# Patient Record
Sex: MALE | Race: WHITE | Employment: FULL TIME | URBAN - METROPOLITAN AREA
[De-identification: names, ages, dates, MRNs, and addresses within clinical notes are randomized per-mention and may not be internally consistent; named-entity substitution may affect disease eponyms.]

---
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
## 2021-03-23 PROBLEM — M51.26 HNP (HERNIATED NUCLEUS PULPOSUS), LUMBAR: Status: ACTIVE | Noted: 2021-03-23

## 2021-03-23 PROBLEM — M43.17 SPONDYLOLISTHESIS AT L5-S1 LEVEL: Status: ACTIVE | Noted: 2021-03-23

## 2021-03-23 PROBLEM — E66.01 MORBID OBESITY (HCC): Status: ACTIVE | Noted: 2021-03-23

## 2021-05-05 ENCOUNTER — HOSPITAL ENCOUNTER (OUTPATIENT)
Dept: SURGERY | Age: 56
Discharge: HOME | End: 2021-05-05
Payer: COMMERCIAL

## 2021-05-05 VITALS
BODY MASS INDEX: 42.66 KG/M2 | HEART RATE: 79 BPM | DIASTOLIC BLOOD PRESSURE: 88 MMHG | WEIGHT: 315 LBS | HEIGHT: 72 IN | OXYGEN SATURATION: 97 % | RESPIRATION RATE: 18 BRPM | SYSTOLIC BLOOD PRESSURE: 162 MMHG | TEMPERATURE: 98.4 F

## 2021-05-05 LAB
ANION GAP SERPL CALC-SCNC: 7 MMOL/L (ref 7–16)
APPEARANCE UR: ABNORMAL
BACTERIA SPEC CULT: ABNORMAL
BACTERIA URNS QL MICRO: 0 /HPF
BASOPHILS # BLD: 0.1 K/UL (ref 0–0.2)
BASOPHILS NFR BLD: 1 % (ref 0–2)
BILIRUB UR QL: NEGATIVE
BUN SERPL-MCNC: 16 MG/DL (ref 6–23)
CALCIUM SERPL-MCNC: 9 MG/DL (ref 8.3–10.4)
CASTS URNS QL MICRO: 0 /LPF
CHLORIDE SERPL-SCNC: 107 MMOL/L (ref 98–107)
CO2 SERPL-SCNC: 25 MMOL/L (ref 21–32)
COLOR UR: YELLOW
CREAT SERPL-MCNC: 0.97 MG/DL (ref 0.8–1.5)
DIFFERENTIAL METHOD BLD: ABNORMAL
EOSINOPHIL # BLD: 0.2 K/UL (ref 0–0.8)
EOSINOPHIL NFR BLD: 3 % (ref 0.5–7.8)
EPI CELLS #/AREA URNS HPF: 0 /HPF
ERYTHROCYTE [DISTWIDTH] IN BLOOD BY AUTOMATED COUNT: 13.2 % (ref 11.9–14.6)
EST. AVERAGE GLUCOSE BLD GHB EST-MCNC: 214 MG/DL
GLUCOSE BLD STRIP.AUTO-MCNC: 136 MG/DL (ref 65–100)
GLUCOSE SERPL-MCNC: 143 MG/DL (ref 65–100)
GLUCOSE UR STRIP.AUTO-MCNC: >1000 MG/DL
HBA1C MFR BLD: 9.1 % (ref 4.2–6.3)
HCT VFR BLD AUTO: 51.3 % (ref 41.1–50.3)
HGB BLD-MCNC: 16.8 G/DL (ref 13.6–17.2)
HGB UR QL STRIP: NEGATIVE
IMM GRANULOCYTES # BLD AUTO: 0 K/UL (ref 0–0.5)
IMM GRANULOCYTES NFR BLD AUTO: 1 % (ref 0–5)
KETONES UR QL STRIP.AUTO: NEGATIVE MG/DL
LEUKOCYTE ESTERASE UR QL STRIP.AUTO: NEGATIVE
LYMPHOCYTES # BLD: 2 K/UL (ref 0.5–4.6)
LYMPHOCYTES NFR BLD: 27 % (ref 13–44)
MCH RBC QN AUTO: 27.8 PG (ref 26.1–32.9)
MCHC RBC AUTO-ENTMCNC: 32.7 G/DL (ref 31.4–35)
MCV RBC AUTO: 84.8 FL (ref 79.6–97.8)
MONOCYTES # BLD: 0.6 K/UL (ref 0.1–1.3)
MONOCYTES NFR BLD: 8 % (ref 4–12)
NEUTS SEG # BLD: 4.6 K/UL (ref 1.7–8.2)
NEUTS SEG NFR BLD: 61 % (ref 43–78)
NITRITE UR QL STRIP.AUTO: NEGATIVE
NRBC # BLD: 0 K/UL (ref 0–0.2)
PH UR STRIP: 6 [PH] (ref 5–9)
PLATELET # BLD AUTO: 274 K/UL (ref 150–450)
PMV BLD AUTO: 10.8 FL (ref 9.4–12.3)
POTASSIUM SERPL-SCNC: 3.8 MMOL/L (ref 3.5–5.1)
PROT UR STRIP-MCNC: NEGATIVE MG/DL
RBC # BLD AUTO: 6.05 M/UL (ref 4.23–5.6)
RBC #/AREA URNS HPF: ABNORMAL /HPF
SERVICE CMNT-IMP: ABNORMAL
SERVICE CMNT-IMP: ABNORMAL
SODIUM SERPL-SCNC: 139 MMOL/L (ref 138–145)
SP GR UR REFRACTOMETRY: 1.05 (ref 1–1.02)
UROBILINOGEN UR QL STRIP.AUTO: 1 EU/DL (ref 0.2–1)
WBC # BLD AUTO: 7.6 K/UL (ref 4.3–11.1)
WBC URNS QL MICRO: ABNORMAL /HPF

## 2021-05-05 PROCEDURE — 80048 BASIC METABOLIC PNL TOTAL CA: CPT

## 2021-05-05 PROCEDURE — 81003 URINALYSIS AUTO W/O SCOPE: CPT

## 2021-05-05 PROCEDURE — 82962 GLUCOSE BLOOD TEST: CPT

## 2021-05-05 PROCEDURE — 85025 COMPLETE CBC W/AUTO DIFF WBC: CPT

## 2021-05-05 PROCEDURE — 77030027138 HC INCENT SPIROMETER -A

## 2021-05-05 PROCEDURE — 83036 HEMOGLOBIN GLYCOSYLATED A1C: CPT

## 2021-05-05 PROCEDURE — 87641 MR-STAPH DNA AMP PROBE: CPT

## 2021-05-05 RX ORDER — IBUPROFEN 200 MG
400 TABLET ORAL AS NEEDED
Status: ON HOLD | COMMUNITY
End: 2021-11-16

## 2021-05-05 RX ORDER — ACETAMINOPHEN 500 MG
500 TABLET ORAL AS NEEDED
COMMUNITY

## 2021-05-05 RX ORDER — CETIRIZINE HCL 10 MG
10 TABLET ORAL AS NEEDED
COMMUNITY

## 2021-05-05 NOTE — PERIOP NOTES
PLEASE CONTINUE TAKING ALL PRESCRIPTION MEDICATIONS UP TO THE DAY OF SURGERY UNLESS OTHERWISE DIRECTED BELOW. DISCONTINUE all vitamins and supplements 7 days prior to surgery. DISCONTINUE Non-Steriodal Anti-Inflammatory (NSAIDS) such as Advil and Aleve 5 days prior to surgery. Home Medications to take  the day of surgery    metoprolol   tramadol if needed   Aspirin 81 mg     Esomeprazole-nexium    Levothyroxine    Novolog 70/30: If the morning blood glucose is greater than 120 --take 1/2 of the morning dose, take 15 units. If morning blood glucose is less than 120, hold the insulin     Home Medications   to Hold   Vitamins, Supplements, and Herbals. Non-Steriodal Anti-Inflammatory (NSAIDS) such as Advil-ibuprofen and Aleve. Hold farxiga, glipizide, fenofibrate the morning of surgery        Comments    Covid test 2:45 @ [address] and CPAP   Per anesthesia protocol: If you feel symptoms of low blood sugar while fasting, check level. If low, drink only 4 ounces of a clear, sugary liquid and call pre-op at [phone]. Please do not bring home medications with you on the day of surgery unless otherwise directed by your nurse. If you are instructed to bring home medications, please give them to your nurse as they will be administered by the nursing staff. If you have any questions, please call [place] [phone] or [facility] [phone]. A copy of this note was provided to the patient for reference.

## 2021-05-05 NOTE — PERIOP NOTES
Recent Results (from the past 12 hour(s))   GLUCOSE, POC    Collection Time: 05/05/21  1:24 PM   Result Value Ref Range    Glucose (POC) 136 (H) 65 - 100 mg/dL    Performed by Aaron    CBC WITH AUTOMATED DIFF    Collection Time: 05/05/21  1:26 PM   Result Value Ref Range    WBC 7.6 4.3 - 11.1 K/uL    RBC 6.05 (H) 4.23 - 5.6 M/uL    HGB 16.8 13.6 - 17.2 g/dL    HCT 51.3 (H) 41.1 - 50.3 %    MCV 84.8 79.6 - 97.8 FL    MCH 27.8 26.1 - 32.9 PG    MCHC 32.7 31.4 - 35.0 g/dL    RDW 13.2 11.9 - 14.6 %    PLATELET 576 903 - 082 K/uL    MPV 10.8 9.4 - 12.3 FL    ABSOLUTE NRBC 0.00 0.0 - 0.2 K/uL    DF AUTOMATED      NEUTROPHILS 61 43 - 78 %    LYMPHOCYTES 27 13 - 44 %    MONOCYTES 8 4.0 - 12.0 %    EOSINOPHILS 3 0.5 - 7.8 %    BASOPHILS 1 0.0 - 2.0 %    IMMATURE GRANULOCYTES 1 0.0 - 5.0 %    ABS. NEUTROPHILS 4.6 1.7 - 8.2 K/UL    ABS. LYMPHOCYTES 2.0 0.5 - 4.6 K/UL    ABS. MONOCYTES 0.6 0.1 - 1.3 K/UL    ABS. EOSINOPHILS 0.2 0.0 - 0.8 K/UL    ABS. BASOPHILS 0.1 0.0 - 0.2 K/UL    ABS. IMM.  GRANS. 0.0 0.0 - 0.5 K/UL   METABOLIC PANEL, BASIC    Collection Time: 05/05/21  1:26 PM   Result Value Ref Range    Sodium 139 138 - 145 mmol/L    Potassium 3.8 3.5 - 5.1 mmol/L    Chloride 107 98 - 107 mmol/L    CO2 25 21 - 32 mmol/L    Anion gap 7 7 - 16 mmol/L    Glucose 143 (H) 65 - 100 mg/dL    BUN 16 6 - 23 MG/DL    Creatinine 0.97 0.8 - 1.5 MG/DL    GFR est AA >60 >60 ml/min/1.73m2    GFR est non-AA >60 >60 ml/min/1.73m2    Calcium 9.0 8.3 - 10.4 MG/DL   URINALYSIS W/ RFLX MICROSCOPIC    Collection Time: 05/05/21  1:26 PM   Result Value Ref Range    Color YELLOW      Appearance TURBID      Specific gravity 1.052 (H) 1.001 - 1.023      pH (UA) 6.0 5.0 - 9.0      Protein Negative NEG mg/dL    Glucose >1,000 mg/dL    Ketone Negative NEG mg/dL    Bilirubin Negative NEG      Blood Negative NEG      Urobilinogen 1.0 0.2 - 1.0 EU/dL    Nitrites Negative NEG      Leukocyte Esterase Negative NEG      WBC 0-3 0 /hpf    RBC 0-3 0 /hpf    Epithelial cells 0 0 /hpf    Bacteria 0 0 /hpf    Casts 0 0 /lpf   MSSA/MRSA SC BY PCR, NASAL SWAB    Collection Time: 05/05/21  1:26 PM    Specimen: Nasal swab   Result Value Ref Range    Special Requests: NO SPECIAL REQUESTS      Culture result: (A)       MRSA target DNA not detected, SA target DNA detected. A MRSA negative, SA positive test result does not preclude MRSA nasal colonization. HEMOGLOBIN A1C WITH EAG    Collection Time: 05/05/21  1:26 PM   Result Value Ref Range    Hemoglobin A1c 9.1 (H) 4.20 - 6.30 %    Est. average glucose 214 mg/dL     Reviewed, within defined limits of anesthesia protocol. Germaine at Dr. Cruz Muller office notified of UA- specific gravity 1.052. Sarmad Pearson at Dr. Cruz Muller office notified of A1c 9.1 and nasal swab SA positive.   Routed to surgeon

## 2021-05-05 NOTE — PERIOP NOTES
Patient verified name & . Order to obtain consent  found in EHR  and matches case posting. TYPE  CASE: 3              Orders:  received  Labs per Spine protocol:  CBC with Diff, BMP, UA, MRSA/MSSA   Labs per anesthesia protocol: CBC, BMP included. T&S, SQBS s/h for DOS  EKG/cardiac records  :  3/19/21 copy of EKG from [facility] sent to pre-op area. 3/19/21 cardiac clearance, Dr. Kanika Spence CE  Echo 20:  EF 50-54%  Glucose: 136    Patient was found to have a recent positive Covid-19 history, the below timetable is used for the earliest OR date for their elective procedure. Patient states had no respiratory symptoms including cough and/or dyspnea, was not hospitalized and not in the ICU. The date of the test was 3/25/21. The earliest OR date is 21. Medication bottles visualized today. Medication instructions provided per printout of PTA med instruction note in EHR (copy provided to patient). Patient verbalizes understanding of instructions. Covid test 21 at 2:45, 2 [employer]. Patient confirmed appointment. Pt viewed Spine Pre-hab video. All further questions were addressed. Pt was provided with antibacterial soap, Hibiclens, Spine Recovery booklet and incentive spirometer. Handouts and all Surgery instructions provided to pt and pt verbalizes understanding. Patient Guide to Surgery Packet provided to patient. Packet includes Patient Guide to surgery handout, Facts about Pain Management handout, Hand Hygiene handout, Patient Education and Teaching Sheet -Transfusion of Blood and Blood Products handout, and  [facility] frequent question and answer sheet. Guide reviewed with the patient and all questions answered to the satisfaction of the patient. Patient instructed on the following and verbalized understanding:  Arrive at Main entrance, time of arrival to be called the day before by 1700.   Responsible adult must drive patient to and from hospital and stay with them 24 hours postoperatively. Npo after midnight including gum, mints and ice chips. Shower using hibiclens the night before and the morning of surgery. Hibiclens provided to the patient with handout and verbal instructions for use. Leave all valuables at home. Instructed on no jewelry or body piercings on the dos. Bring insurance card and ID. No perfumes, oil, powder, colognes, makeup or  lotions on the skin. Visiting hours: 6:30 am- 9 pm for adult visitors. Patient verbalized understanding of all instructions and provided all medical/health information to the best of their ability.

## 2021-05-11 ENCOUNTER — ANESTHESIA EVENT (OUTPATIENT)
Dept: SURGERY | Age: 56
DRG: 460 | End: 2021-05-11
Payer: COMMERCIAL

## 2021-05-12 ENCOUNTER — APPOINTMENT (OUTPATIENT)
Dept: GENERAL RADIOLOGY | Age: 56
DRG: 460 | End: 2021-05-12
Attending: NEUROLOGICAL SURGERY
Payer: COMMERCIAL

## 2021-05-12 ENCOUNTER — HOSPITAL ENCOUNTER (INPATIENT)
Age: 56
LOS: 1 days | Discharge: HOME | DRG: 460 | End: 2021-05-13
Attending: NEUROLOGICAL SURGERY | Admitting: NEUROLOGICAL SURGERY
Payer: COMMERCIAL

## 2021-05-12 ENCOUNTER — ANESTHESIA (OUTPATIENT)
Dept: SURGERY | Age: 56
DRG: 460 | End: 2021-05-12
Payer: COMMERCIAL

## 2021-05-12 DIAGNOSIS — M43.17 SPONDYLOLISTHESIS AT L5-S1 LEVEL: Primary | ICD-10-CM

## 2021-05-12 LAB
ABO + RH BLD: NORMAL
BLOOD GROUP ANTIBODIES SERPL: NORMAL
GLUCOSE BLD STRIP.AUTO-MCNC: 161 MG/DL (ref 65–100)
GLUCOSE BLD STRIP.AUTO-MCNC: 239 MG/DL (ref 65–100)
GLUCOSE BLD STRIP.AUTO-MCNC: 240 MG/DL (ref 65–100)
GLUCOSE BLD STRIP.AUTO-MCNC: 252 MG/DL (ref 65–100)
GLUCOSE BLD STRIP.AUTO-MCNC: 253 MG/DL (ref 65–100)
GLUCOSE BLD STRIP.AUTO-MCNC: 262 MG/DL (ref 65–100)
SERVICE CMNT-IMP: ABNORMAL
SPECIMEN EXP DATE BLD: NORMAL

## 2021-05-12 PROCEDURE — 22630 ARTHRD PST TQ 1NTRSPC LUM: CPT | Performed by: NEUROLOGICAL SURGERY

## 2021-05-12 PROCEDURE — 74011250637 HC RX REV CODE- 250/637: Performed by: NEUROLOGICAL SURGERY

## 2021-05-12 PROCEDURE — 74011250636 HC RX REV CODE- 250/636: Performed by: ANESTHESIOLOGY

## 2021-05-12 PROCEDURE — 77030040830 HC CATH URETH FOL MDII -A: Performed by: NEUROLOGICAL SURGERY

## 2021-05-12 PROCEDURE — C1713 ANCHOR/SCREW BN/BN,TIS/BN: HCPCS | Performed by: NEUROLOGICAL SURGERY

## 2021-05-12 PROCEDURE — 77030040106 HC FCPS BIPOLAR DISP INLC -D: Performed by: NEUROLOGICAL SURGERY

## 2021-05-12 PROCEDURE — 65270000029 HC RM PRIVATE

## 2021-05-12 PROCEDURE — 74011000250 HC RX REV CODE- 250: Performed by: NEUROLOGICAL SURGERY

## 2021-05-12 PROCEDURE — 76060000039 HC ANESTHESIA 4 TO 4.5 HR: Performed by: NEUROLOGICAL SURGERY

## 2021-05-12 PROCEDURE — 77030018390 HC SPNG HEMSTAT2 J&J -B: Performed by: NEUROLOGICAL SURGERY

## 2021-05-12 PROCEDURE — 74011000250 HC RX REV CODE- 250: Performed by: NURSE ANESTHETIST, CERTIFIED REGISTERED

## 2021-05-12 PROCEDURE — 74011250637 HC RX REV CODE- 250/637: Performed by: ANESTHESIOLOGY

## 2021-05-12 PROCEDURE — 72100 X-RAY EXAM L-S SPINE 2/3 VWS: CPT

## 2021-05-12 PROCEDURE — 74011636637 HC RX REV CODE- 636/637: Performed by: ANESTHESIOLOGY

## 2021-05-12 PROCEDURE — 20939 BONE MARROW ASPIR BONE GRFG: CPT | Performed by: NEUROLOGICAL SURGERY

## 2021-05-12 PROCEDURE — 4A11X4G MONITORING OF PERIPHERAL NERVOUS ELECTRICAL ACTIVITY, INTRAOPERATIVE, EXTERNAL APPROACH: ICD-10-PCS | Performed by: NEUROLOGICAL SURGERY

## 2021-05-12 PROCEDURE — 2709999900 HC NON-CHARGEABLE SUPPLY: Performed by: NEUROLOGICAL SURGERY

## 2021-05-12 PROCEDURE — 82962 GLUCOSE BLOOD TEST: CPT

## 2021-05-12 PROCEDURE — 77030013951 HC SEAL TISS ADH DURASL KT INLC -G1: Performed by: NEUROLOGICAL SURGERY

## 2021-05-12 PROCEDURE — 77030019908 HC STETH ESOPH SIMS -A: Performed by: ANESTHESIOLOGY

## 2021-05-12 PROCEDURE — 77030019557 HC ELECTRD VES SEAL MEDT -F: Performed by: NEUROLOGICAL SURGERY

## 2021-05-12 PROCEDURE — 77030037804 HC GRFT BNE GRAN OSTEOAMP BTUS -F: Performed by: NEUROLOGICAL SURGERY

## 2021-05-12 PROCEDURE — 2709999900 HC NON-CHARGEABLE SUPPLY

## 2021-05-12 PROCEDURE — 74011250636 HC RX REV CODE- 250/636: Performed by: NEUROLOGICAL SURGERY

## 2021-05-12 PROCEDURE — 77030003029 HC SUT VCRL J&J -B: Performed by: NEUROLOGICAL SURGERY

## 2021-05-12 PROCEDURE — 77030014007 HC SPNG HEMSTAT J&J -B: Performed by: NEUROLOGICAL SURGERY

## 2021-05-12 PROCEDURE — 77030021678 HC GLIDESCP STAT DISP VERT -B: Performed by: ANESTHESIOLOGY

## 2021-05-12 PROCEDURE — 77030013567 HC DRN WND RESERV BARD -A: Performed by: NEUROLOGICAL SURGERY

## 2021-05-12 PROCEDURE — 0ST40ZZ RESECTION OF LUMBOSACRAL DISC, OPEN APPROACH: ICD-10-PCS | Performed by: NEUROLOGICAL SURGERY

## 2021-05-12 PROCEDURE — 76010000175 HC OR TIME 4 TO 4.5 HR INTENSV-TIER 1: Performed by: NEUROLOGICAL SURGERY

## 2021-05-12 PROCEDURE — 77030013794 HC KT TRNSDUC BLD EDWD -B: Performed by: ANESTHESIOLOGY

## 2021-05-12 PROCEDURE — C1889 IMPLANT/INSERT DEVICE, NOC: HCPCS | Performed by: NEUROLOGICAL SURGERY

## 2021-05-12 PROCEDURE — 77030034475 HC MISC IMPL SPN: Performed by: NEUROLOGICAL SURGERY

## 2021-05-12 PROCEDURE — 77030028270 HC SRGFL HEMSTAT MTRX J&J -C: Performed by: NEUROLOGICAL SURGERY

## 2021-05-12 PROCEDURE — 0SG30AJ FUSION OF LUMBOSACRAL JOINT WITH INTERBODY FUSION DEVICE, POSTERIOR APPROACH, ANTERIOR COLUMN, OPEN APPROACH: ICD-10-PCS | Performed by: NEUROLOGICAL SURGERY

## 2021-05-12 PROCEDURE — 00NY0ZZ RELEASE LUMBAR SPINAL CORD, OPEN APPROACH: ICD-10-PCS | Performed by: NEUROLOGICAL SURGERY

## 2021-05-12 PROCEDURE — 77030012935 HC DRSG AQUACEL BMS -B: Performed by: NEUROLOGICAL SURGERY

## 2021-05-12 PROCEDURE — 74011636637 HC RX REV CODE- 636/637: Performed by: NEUROLOGICAL SURGERY

## 2021-05-12 PROCEDURE — 77030037088 HC TUBE ENDOTRACH ORAL NSL COVD-A: Performed by: ANESTHESIOLOGY

## 2021-05-12 PROCEDURE — 77030040361 HC SLV COMPR DVT MDII -B: Performed by: NEUROLOGICAL SURGERY

## 2021-05-12 PROCEDURE — 86901 BLOOD TYPING SEROLOGIC RH(D): CPT

## 2021-05-12 PROCEDURE — 77030013292 HC BOWL MX PRSM J&J -A: Performed by: ANESTHESIOLOGY

## 2021-05-12 PROCEDURE — 77030040922 HC BLNKT HYPOTHRM STRY -A: Performed by: ANESTHESIOLOGY

## 2021-05-12 PROCEDURE — 77030012406 HC DRN WND PENRS BARD -A: Performed by: NEUROLOGICAL SURGERY

## 2021-05-12 PROCEDURE — 22853 INSJ BIOMECHANICAL DEVICE: CPT | Performed by: NEUROLOGICAL SURGERY

## 2021-05-12 PROCEDURE — 77030012894: Performed by: NEUROLOGICAL SURGERY

## 2021-05-12 PROCEDURE — 88304 TISSUE EXAM BY PATHOLOGIST: CPT

## 2021-05-12 PROCEDURE — 01NB0ZZ RELEASE LUMBAR NERVE, OPEN APPROACH: ICD-10-PCS | Performed by: NEUROLOGICAL SURGERY

## 2021-05-12 PROCEDURE — 77030005401 HC CATH RAD ARRO -A: Performed by: ANESTHESIOLOGY

## 2021-05-12 PROCEDURE — 74011000272 HC RX REV CODE- 272: Performed by: NEUROLOGICAL SURGERY

## 2021-05-12 PROCEDURE — 74011250636 HC RX REV CODE- 250/636: Performed by: NURSE ANESTHETIST, CERTIFIED REGISTERED

## 2021-05-12 PROCEDURE — 20936 SP BONE AGRFT LOCAL ADD-ON: CPT | Performed by: NEUROLOGICAL SURGERY

## 2021-05-12 PROCEDURE — 22840 INSERT SPINE FIXATION DEVICE: CPT | Performed by: NEUROLOGICAL SURGERY

## 2021-05-12 PROCEDURE — 76210000006 HC OR PH I REC 0.5 TO 1 HR: Performed by: NEUROLOGICAL SURGERY

## 2021-05-12 DEVICE — TOP LOADING BODY
Type: IMPLANTABLE DEVICE | Site: SPINE LUMBAR | Status: FUNCTIONAL
Brand: FIREBIRD NXG

## 2021-05-12 DEVICE — GRAFT BNE SUB 25CC 2MM GRAN ALLOGENIC MORPHOGENETIC PROT W: Type: IMPLANTABLE DEVICE | Site: SPINE LUMBAR | Status: FUNCTIONAL

## 2021-05-12 DEVICE — SET SCREW
Type: IMPLANTABLE DEVICE | Site: SPINE LUMBAR | Status: FUNCTIONAL
Brand: FIREBIRD NXG

## 2021-05-12 DEVICE — 6.5MM X 10MM WIDE X 24MM NON-LORDOTIC  ASSEMBLY
Type: IMPLANTABLE DEVICE | Site: SPINE LUMBAR | Status: FUNCTIONAL
Brand: FORZA XP

## 2021-05-12 DEVICE — 5.5MM X 40MM CORTICAL BONE SCREW
Type: IMPLANTABLE DEVICE | Site: SPINE LUMBAR | Status: FUNCTIONAL
Brand: JANUS

## 2021-05-12 DEVICE — 35MM ROD, PRE-LORDOSED
Type: IMPLANTABLE DEVICE | Site: SPINE LUMBAR | Status: FUNCTIONAL
Brand: FIREBIRD

## 2021-05-12 DEVICE — 5.5MM X 50MM CORTICAL BONE SCREW
Type: IMPLANTABLE DEVICE | Site: SPINE LUMBAR | Status: FUNCTIONAL
Brand: JANUS

## 2021-05-12 RX ORDER — OXYCODONE HYDROCHLORIDE 5 MG/1
5 TABLET ORAL
Status: COMPLETED | OUTPATIENT
Start: 2021-05-12 | End: 2021-05-12

## 2021-05-12 RX ORDER — OXYCODONE AND ACETAMINOPHEN 5; 325 MG/1; MG/1
1 TABLET ORAL AS NEEDED
Status: DISCONTINUED | OUTPATIENT
Start: 2021-05-12 | End: 2021-05-12 | Stop reason: HOSPADM

## 2021-05-12 RX ORDER — SODIUM CHLORIDE 0.9 % (FLUSH) 0.9 %
5-40 SYRINGE (ML) INJECTION EVERY 8 HOURS
Status: DISCONTINUED | OUTPATIENT
Start: 2021-05-12 | End: 2021-05-13

## 2021-05-12 RX ORDER — NEOSTIGMINE METHYLSULFATE 1 MG/ML
INJECTION, SOLUTION INTRAVENOUS AS NEEDED
Status: DISCONTINUED | OUTPATIENT
Start: 2021-05-12 | End: 2021-05-12 | Stop reason: HOSPADM

## 2021-05-12 RX ORDER — ASPIRIN 81 MG/1
81 TABLET ORAL DAILY
Status: DISCONTINUED | OUTPATIENT
Start: 2021-05-13 | End: 2021-05-13 | Stop reason: HOSPADM

## 2021-05-12 RX ORDER — VANCOMYCIN HYDROCHLORIDE 1 G/20ML
INJECTION, POWDER, LYOPHILIZED, FOR SOLUTION INTRAVENOUS AS NEEDED
Status: DISCONTINUED | OUTPATIENT
Start: 2021-05-12 | End: 2021-05-12 | Stop reason: HOSPADM

## 2021-05-12 RX ORDER — FAMOTIDINE 20 MG/1
20 TABLET, FILM COATED ORAL ONCE
Status: COMPLETED | OUTPATIENT
Start: 2021-05-12 | End: 2021-05-12

## 2021-05-12 RX ORDER — ZOLPIDEM TARTRATE 5 MG/1
5 TABLET ORAL
Status: DISCONTINUED | OUTPATIENT
Start: 2021-05-12 | End: 2021-05-13 | Stop reason: HOSPADM

## 2021-05-12 RX ORDER — THROMBIN, TOPICAL (BOVINE) 20000 UNIT
KIT TOPICAL AS NEEDED
Status: DISCONTINUED | OUTPATIENT
Start: 2021-05-12 | End: 2021-05-12 | Stop reason: HOSPADM

## 2021-05-12 RX ORDER — INSULIN GLARGINE 100 [IU]/ML
40 INJECTION, SOLUTION SUBCUTANEOUS
Status: DISCONTINUED | OUTPATIENT
Start: 2021-05-12 | End: 2021-05-13 | Stop reason: HOSPADM

## 2021-05-12 RX ORDER — SODIUM CHLORIDE 0.9 % (FLUSH) 0.9 %
5-40 SYRINGE (ML) INJECTION EVERY 8 HOURS
Status: DISCONTINUED | OUTPATIENT
Start: 2021-05-12 | End: 2021-05-12 | Stop reason: HOSPADM

## 2021-05-12 RX ORDER — FENOFIBRATE 160 MG/1
160 TABLET ORAL DAILY
Status: DISCONTINUED | OUTPATIENT
Start: 2021-05-13 | End: 2021-05-13 | Stop reason: HOSPADM

## 2021-05-12 RX ORDER — PROPOFOL 10 MG/ML
INJECTION, EMULSION INTRAVENOUS AS NEEDED
Status: DISCONTINUED | OUTPATIENT
Start: 2021-05-12 | End: 2021-05-12 | Stop reason: HOSPADM

## 2021-05-12 RX ORDER — OXYCODONE AND ACETAMINOPHEN 10; 325 MG/1; MG/1
1 TABLET ORAL
Status: DISCONTINUED | OUTPATIENT
Start: 2021-05-12 | End: 2021-05-13 | Stop reason: HOSPADM

## 2021-05-12 RX ORDER — SODIUM CHLORIDE 0.9 % (FLUSH) 0.9 %
5-40 SYRINGE (ML) INJECTION AS NEEDED
Status: DISCONTINUED | OUTPATIENT
Start: 2021-05-12 | End: 2021-05-13 | Stop reason: HOSPADM

## 2021-05-12 RX ORDER — SODIUM CHLORIDE, SODIUM LACTATE, POTASSIUM CHLORIDE, CALCIUM CHLORIDE 600; 310; 30; 20 MG/100ML; MG/100ML; MG/100ML; MG/100ML
75 INJECTION, SOLUTION INTRAVENOUS CONTINUOUS
Status: DISPENSED | OUTPATIENT
Start: 2021-05-12 | End: 2021-05-13

## 2021-05-12 RX ORDER — KETAMINE HYDROCHLORIDE 50 MG/ML
INJECTION, SOLUTION INTRAMUSCULAR; INTRAVENOUS AS NEEDED
Status: DISCONTINUED | OUTPATIENT
Start: 2021-05-12 | End: 2021-05-12 | Stop reason: HOSPADM

## 2021-05-12 RX ORDER — SODIUM CHLORIDE 0.9 % (FLUSH) 0.9 %
5-40 SYRINGE (ML) INJECTION AS NEEDED
Status: DISCONTINUED | OUTPATIENT
Start: 2021-05-12 | End: 2021-05-13

## 2021-05-12 RX ORDER — MIDAZOLAM HYDROCHLORIDE 1 MG/ML
2 INJECTION, SOLUTION INTRAMUSCULAR; INTRAVENOUS ONCE
Status: ACTIVE | OUTPATIENT
Start: 2021-05-12 | End: 2021-05-12

## 2021-05-12 RX ORDER — FENTANYL CITRATE 50 UG/ML
25 INJECTION, SOLUTION INTRAMUSCULAR; INTRAVENOUS ONCE
Status: ACTIVE | OUTPATIENT
Start: 2021-05-12 | End: 2021-05-12

## 2021-05-12 RX ORDER — ROCURONIUM BROMIDE 10 MG/ML
INJECTION, SOLUTION INTRAVENOUS AS NEEDED
Status: DISCONTINUED | OUTPATIENT
Start: 2021-05-12 | End: 2021-05-12 | Stop reason: HOSPADM

## 2021-05-12 RX ORDER — ALOGLIPTIN 25 MG/1
25 TABLET, FILM COATED ORAL DAILY
Status: DISCONTINUED | OUTPATIENT
Start: 2021-05-12 | End: 2021-05-13 | Stop reason: HOSPADM

## 2021-05-12 RX ORDER — ONDANSETRON 2 MG/ML
INJECTION INTRAMUSCULAR; INTRAVENOUS AS NEEDED
Status: DISCONTINUED | OUTPATIENT
Start: 2021-05-12 | End: 2021-05-12 | Stop reason: HOSPADM

## 2021-05-12 RX ORDER — SODIUM CHLORIDE, SODIUM LACTATE, POTASSIUM CHLORIDE, CALCIUM CHLORIDE 600; 310; 30; 20 MG/100ML; MG/100ML; MG/100ML; MG/100ML
100 INJECTION, SOLUTION INTRAVENOUS CONTINUOUS
Status: DISCONTINUED | OUTPATIENT
Start: 2021-05-12 | End: 2021-05-12 | Stop reason: HOSPADM

## 2021-05-12 RX ORDER — VANCOMYCIN 2 GRAM/500 ML IN 0.9 % SODIUM CHLORIDE INTRAVENOUS
2 EVERY 12 HOURS
Status: DISCONTINUED | OUTPATIENT
Start: 2021-05-12 | End: 2021-05-13 | Stop reason: HOSPADM

## 2021-05-12 RX ORDER — SODIUM CHLORIDE 9 MG/ML
50 INJECTION, SOLUTION INTRAVENOUS CONTINUOUS
Status: DISPENSED | OUTPATIENT
Start: 2021-05-12 | End: 2021-05-13

## 2021-05-12 RX ORDER — LIDOCAINE HYDROCHLORIDE 10 MG/ML
0.1 INJECTION INFILTRATION; PERINEURAL AS NEEDED
Status: DISCONTINUED | OUTPATIENT
Start: 2021-05-12 | End: 2021-05-13 | Stop reason: HOSPADM

## 2021-05-12 RX ORDER — METOPROLOL TARTRATE 25 MG/1
12.5 TABLET, FILM COATED ORAL DAILY
Status: DISCONTINUED | OUTPATIENT
Start: 2021-05-13 | End: 2021-05-13 | Stop reason: HOSPADM

## 2021-05-12 RX ORDER — ATORVASTATIN CALCIUM 80 MG/1
80 TABLET, FILM COATED ORAL DAILY
Status: DISCONTINUED | OUTPATIENT
Start: 2021-05-13 | End: 2021-05-13 | Stop reason: HOSPADM

## 2021-05-12 RX ORDER — GLIPIZIDE 5 MG/1
10 TABLET ORAL 2 TIMES DAILY
Status: DISCONTINUED | OUTPATIENT
Start: 2021-05-12 | End: 2021-05-13 | Stop reason: HOSPADM

## 2021-05-12 RX ORDER — SODIUM CHLORIDE, SODIUM LACTATE, POTASSIUM CHLORIDE, CALCIUM CHLORIDE 600; 310; 30; 20 MG/100ML; MG/100ML; MG/100ML; MG/100ML
100 INJECTION, SOLUTION INTRAVENOUS CONTINUOUS
Status: DISCONTINUED | OUTPATIENT
Start: 2021-05-12 | End: 2021-05-13 | Stop reason: HOSPADM

## 2021-05-12 RX ORDER — PANTOPRAZOLE SODIUM 40 MG/1
40 TABLET, DELAYED RELEASE ORAL 2 TIMES DAILY
Status: DISCONTINUED | OUTPATIENT
Start: 2021-05-12 | End: 2021-05-13 | Stop reason: HOSPADM

## 2021-05-12 RX ORDER — ACETAMINOPHEN 325 MG/1
650 TABLET ORAL
Status: DISCONTINUED | OUTPATIENT
Start: 2021-05-12 | End: 2021-05-13 | Stop reason: HOSPADM

## 2021-05-12 RX ORDER — LEVOTHYROXINE SODIUM 50 UG/1
25 TABLET ORAL
Status: DISCONTINUED | OUTPATIENT
Start: 2021-05-13 | End: 2021-05-13 | Stop reason: HOSPADM

## 2021-05-12 RX ORDER — SODIUM CHLORIDE, SODIUM LACTATE, POTASSIUM CHLORIDE, CALCIUM CHLORIDE 600; 310; 30; 20 MG/100ML; MG/100ML; MG/100ML; MG/100ML
INJECTION, SOLUTION INTRAVENOUS
Status: DISCONTINUED | OUTPATIENT
Start: 2021-05-12 | End: 2021-05-12 | Stop reason: HOSPADM

## 2021-05-12 RX ORDER — OXYCODONE HYDROCHLORIDE 5 MG/1
5 TABLET ORAL ONCE
Status: COMPLETED | OUTPATIENT
Start: 2021-05-12 | End: 2021-05-12

## 2021-05-12 RX ORDER — MIDAZOLAM HYDROCHLORIDE 1 MG/ML
2 INJECTION, SOLUTION INTRAMUSCULAR; INTRAVENOUS
Status: ACTIVE | OUTPATIENT
Start: 2021-05-12 | End: 2021-05-13

## 2021-05-12 RX ORDER — DIPHENHYDRAMINE HYDROCHLORIDE 50 MG/ML
12.5 INJECTION, SOLUTION INTRAMUSCULAR; INTRAVENOUS
Status: DISCONTINUED | OUTPATIENT
Start: 2021-05-12 | End: 2021-05-12 | Stop reason: HOSPADM

## 2021-05-12 RX ORDER — LIDOCAINE HYDROCHLORIDE 20 MG/ML
INJECTION, SOLUTION EPIDURAL; INFILTRATION; INTRACAUDAL; PERINEURAL AS NEEDED
Status: DISCONTINUED | OUTPATIENT
Start: 2021-05-12 | End: 2021-05-12 | Stop reason: HOSPADM

## 2021-05-12 RX ORDER — LORATADINE 10 MG/1
10 TABLET ORAL DAILY
Status: DISCONTINUED | OUTPATIENT
Start: 2021-05-13 | End: 2021-05-13 | Stop reason: HOSPADM

## 2021-05-12 RX ORDER — LEVOTHYROXINE SODIUM 100 UG/1
200 TABLET ORAL
Status: DISCONTINUED | OUTPATIENT
Start: 2021-05-13 | End: 2021-05-13 | Stop reason: HOSPADM

## 2021-05-12 RX ORDER — HYDROMORPHONE HYDROCHLORIDE 2 MG/ML
INJECTION, SOLUTION INTRAMUSCULAR; INTRAVENOUS; SUBCUTANEOUS AS NEEDED
Status: DISCONTINUED | OUTPATIENT
Start: 2021-05-12 | End: 2021-05-12 | Stop reason: HOSPADM

## 2021-05-12 RX ORDER — GLYCOPYRROLATE 0.2 MG/ML
INJECTION INTRAMUSCULAR; INTRAVENOUS AS NEEDED
Status: DISCONTINUED | OUTPATIENT
Start: 2021-05-12 | End: 2021-05-12 | Stop reason: HOSPADM

## 2021-05-12 RX ORDER — HYDROMORPHONE HYDROCHLORIDE 1 MG/ML
0.5 INJECTION, SOLUTION INTRAMUSCULAR; INTRAVENOUS; SUBCUTANEOUS
Status: DISCONTINUED | OUTPATIENT
Start: 2021-05-12 | End: 2021-05-12 | Stop reason: HOSPADM

## 2021-05-12 RX ORDER — SODIUM CHLORIDE 0.9 % (FLUSH) 0.9 %
5-40 SYRINGE (ML) INJECTION AS NEEDED
Status: DISCONTINUED | OUTPATIENT
Start: 2021-05-12 | End: 2021-05-12 | Stop reason: HOSPADM

## 2021-05-12 RX ORDER — SODIUM CHLORIDE 0.9 % (FLUSH) 0.9 %
5-40 SYRINGE (ML) INJECTION EVERY 8 HOURS
Status: DISCONTINUED | OUTPATIENT
Start: 2021-05-12 | End: 2021-05-13 | Stop reason: HOSPADM

## 2021-05-12 RX ORDER — INSULIN LISPRO 100 [IU]/ML
0-5 INJECTION, SOLUTION INTRAVENOUS; SUBCUTANEOUS
Status: DISCONTINUED | OUTPATIENT
Start: 2021-05-12 | End: 2021-05-13 | Stop reason: HOSPADM

## 2021-05-12 RX ORDER — SUCCINYLCHOLINE CHLORIDE 20 MG/ML
INJECTION INTRAMUSCULAR; INTRAVENOUS AS NEEDED
Status: DISCONTINUED | OUTPATIENT
Start: 2021-05-12 | End: 2021-05-12 | Stop reason: HOSPADM

## 2021-05-12 RX ORDER — HYDROMORPHONE HYDROCHLORIDE 1 MG/ML
2 INJECTION, SOLUTION INTRAMUSCULAR; INTRAVENOUS; SUBCUTANEOUS
Status: DISCONTINUED | OUTPATIENT
Start: 2021-05-12 | End: 2021-05-13 | Stop reason: HOSPADM

## 2021-05-12 RX ORDER — DEXAMETHASONE SODIUM PHOSPHATE 4 MG/ML
INJECTION, SOLUTION INTRA-ARTICULAR; INTRALESIONAL; INTRAMUSCULAR; INTRAVENOUS; SOFT TISSUE AS NEEDED
Status: DISCONTINUED | OUTPATIENT
Start: 2021-05-12 | End: 2021-05-12 | Stop reason: HOSPADM

## 2021-05-12 RX ORDER — FENTANYL CITRATE 50 UG/ML
INJECTION, SOLUTION INTRAMUSCULAR; INTRAVENOUS AS NEEDED
Status: DISCONTINUED | OUTPATIENT
Start: 2021-05-12 | End: 2021-05-12 | Stop reason: HOSPADM

## 2021-05-12 RX ADMIN — HYDROMORPHONE HYDROCHLORIDE 0.6 MG: 2 INJECTION INTRAMUSCULAR; INTRAVENOUS; SUBCUTANEOUS at 09:53

## 2021-05-12 RX ADMIN — INSULIN HUMAN 4 UNITS: 100 INJECTION, SOLUTION PARENTERAL at 12:09

## 2021-05-12 RX ADMIN — CEFAZOLIN 3 G: 1 INJECTION, POWDER, FOR SOLUTION INTRAVENOUS at 11:23

## 2021-05-12 RX ADMIN — INSULIN HUMAN 4 UNITS: 100 INJECTION, SOLUTION PARENTERAL at 13:17

## 2021-05-12 RX ADMIN — SODIUM CHLORIDE, SODIUM LACTATE, POTASSIUM CHLORIDE, AND CALCIUM CHLORIDE 75 ML/HR: 600; 310; 30; 20 INJECTION, SOLUTION INTRAVENOUS at 06:32

## 2021-05-12 RX ADMIN — Medication 10 ML: at 17:34

## 2021-05-12 RX ADMIN — HYDROMORPHONE HYDROCHLORIDE 0.4 MG: 2 INJECTION INTRAMUSCULAR; INTRAVENOUS; SUBCUTANEOUS at 08:59

## 2021-05-12 RX ADMIN — PHENYLEPHRINE HYDROCHLORIDE 100 MCG: 10 INJECTION INTRAVENOUS at 09:06

## 2021-05-12 RX ADMIN — LIDOCAINE HYDROCHLORIDE 20 MG: 20 INJECTION, SOLUTION EPIDURAL; INFILTRATION; INTRACAUDAL; PERINEURAL at 07:38

## 2021-05-12 RX ADMIN — OXYCODONE HYDROCHLORIDE AND ACETAMINOPHEN 1 TABLET: 10; 325 TABLET ORAL at 21:01

## 2021-05-12 RX ADMIN — PHENYLEPHRINE HYDROCHLORIDE 100 MCG: 10 INJECTION INTRAVENOUS at 09:09

## 2021-05-12 RX ADMIN — Medication 5 MG: at 11:29

## 2021-05-12 RX ADMIN — OXYCODONE 5 MG: 5 TABLET ORAL at 13:49

## 2021-05-12 RX ADMIN — ALOGLIPTIN 25 MG: 25 TABLET, FILM COATED ORAL at 18:10

## 2021-05-12 RX ADMIN — SUCCINYLCHOLINE CHLORIDE 300 MG: 20 INJECTION, SOLUTION INTRAMUSCULAR; INTRAVENOUS at 07:39

## 2021-05-12 RX ADMIN — PHENYLEPHRINE HYDROCHLORIDE 100 MCG: 10 INJECTION INTRAVENOUS at 08:56

## 2021-05-12 RX ADMIN — ONDANSETRON 4 MG: 2 INJECTION INTRAMUSCULAR; INTRAVENOUS at 11:13

## 2021-05-12 RX ADMIN — OXYCODONE 5 MG: 5 TABLET ORAL at 12:28

## 2021-05-12 RX ADMIN — GLYCOPYRROLATE 0.5 MG: 0.2 INJECTION, SOLUTION INTRAMUSCULAR; INTRAVENOUS at 11:29

## 2021-05-12 RX ADMIN — SODIUM CHLORIDE, SODIUM LACTATE, POTASSIUM CHLORIDE, AND CALCIUM CHLORIDE 100 ML/HR: 600; 310; 30; 20 INJECTION, SOLUTION INTRAVENOUS at 17:33

## 2021-05-12 RX ADMIN — INSULIN GLARGINE 40 UNITS: 100 INJECTION, SOLUTION SUBCUTANEOUS at 21:04

## 2021-05-12 RX ADMIN — KETAMINE HYDROCHLORIDE 10 MG: 50 INJECTION INTRAMUSCULAR; INTRAVENOUS at 11:07

## 2021-05-12 RX ADMIN — DEXAMETHASONE SODIUM PHOSPHATE 10 MG: 4 INJECTION, SOLUTION INTRAMUSCULAR; INTRAVENOUS at 08:21

## 2021-05-12 RX ADMIN — PHENYLEPHRINE HYDROCHLORIDE 20 MCG/MIN: 10 INJECTION INTRAVENOUS at 09:28

## 2021-05-12 RX ADMIN — PHENYLEPHRINE HYDROCHLORIDE 50 MCG: 10 INJECTION INTRAVENOUS at 08:53

## 2021-05-12 RX ADMIN — OXYCODONE HYDROCHLORIDE AND ACETAMINOPHEN 1 TABLET: 10; 325 TABLET ORAL at 15:34

## 2021-05-12 RX ADMIN — HYDROMORPHONE HYDROCHLORIDE 0.4 MG: 2 INJECTION INTRAMUSCULAR; INTRAVENOUS; SUBCUTANEOUS at 10:52

## 2021-05-12 RX ADMIN — KETAMINE HYDROCHLORIDE 10 MG: 50 INJECTION INTRAMUSCULAR; INTRAVENOUS at 10:36

## 2021-05-12 RX ADMIN — PROPOFOL 200 MG: 10 INJECTION, EMULSION INTRAVENOUS at 07:38

## 2021-05-12 RX ADMIN — ROCURONIUM BROMIDE 15 MG: 10 INJECTION, SOLUTION INTRAVENOUS at 07:56

## 2021-05-12 RX ADMIN — Medication 3 AMPULE: at 06:31

## 2021-05-12 RX ADMIN — Medication 5 ML: at 21:05

## 2021-05-12 RX ADMIN — FENTANYL CITRATE 50 MCG: 50 INJECTION INTRAMUSCULAR; INTRAVENOUS at 08:18

## 2021-05-12 RX ADMIN — ROCURONIUM BROMIDE 10 MG: 10 INJECTION, SOLUTION INTRAVENOUS at 07:38

## 2021-05-12 RX ADMIN — VANCOMYCIN HYDROCHLORIDE 1.5 G: 1 INJECTION, POWDER, LYOPHILIZED, FOR SOLUTION INTRAVENOUS at 07:50

## 2021-05-12 RX ADMIN — FAMOTIDINE 20 MG: 20 TABLET, FILM COATED ORAL at 06:31

## 2021-05-12 RX ADMIN — GLIPIZIDE 10 MG: 5 TABLET ORAL at 17:21

## 2021-05-12 RX ADMIN — INSULIN LISPRO 2 UNITS: 100 INJECTION, SOLUTION INTRAVENOUS; SUBCUTANEOUS at 21:03

## 2021-05-12 RX ADMIN — HYDROMORPHONE HYDROCHLORIDE 0.4 MG: 2 INJECTION INTRAMUSCULAR; INTRAVENOUS; SUBCUTANEOUS at 10:45

## 2021-05-12 RX ADMIN — Medication 1 AMPULE: at 21:01

## 2021-05-12 RX ADMIN — PANTOPRAZOLE SODIUM 40 MG: 40 TABLET, DELAYED RELEASE ORAL at 17:21

## 2021-05-12 RX ADMIN — KETAMINE HYDROCHLORIDE 30 MG: 50 INJECTION INTRAMUSCULAR; INTRAVENOUS at 08:11

## 2021-05-12 RX ADMIN — CEFAZOLIN 3 G: 1 INJECTION, POWDER, FOR SOLUTION INTRAVENOUS at 08:01

## 2021-05-12 RX ADMIN — FENTANYL CITRATE 50 MCG: 50 INJECTION INTRAMUSCULAR; INTRAVENOUS at 07:38

## 2021-05-12 RX ADMIN — Medication 5 ML: at 21:04

## 2021-05-12 RX ADMIN — KETAMINE HYDROCHLORIDE 20 MG: 50 INJECTION INTRAMUSCULAR; INTRAVENOUS at 09:39

## 2021-05-12 RX ADMIN — HYDROMORPHONE HYDROCHLORIDE 0.2 MG: 2 INJECTION INTRAMUSCULAR; INTRAVENOUS; SUBCUTANEOUS at 11:07

## 2021-05-12 RX ADMIN — PHENYLEPHRINE HYDROCHLORIDE 50 MCG: 10 INJECTION INTRAVENOUS at 08:45

## 2021-05-12 RX ADMIN — SODIUM CHLORIDE, SODIUM LACTATE, POTASSIUM CHLORIDE, AND CALCIUM CHLORIDE: 600; 310; 30; 20 INJECTION, SOLUTION INTRAVENOUS at 09:01

## 2021-05-12 RX ADMIN — SODIUM CHLORIDE, SODIUM LACTATE, POTASSIUM CHLORIDE, AND CALCIUM CHLORIDE: 600; 310; 30; 20 INJECTION, SOLUTION INTRAVENOUS at 09:29

## 2021-05-12 RX ADMIN — VANCOMYCIN HYDROCHLORIDE 2000 MG: 10 INJECTION, POWDER, LYOPHILIZED, FOR SOLUTION INTRAVENOUS at 20:55

## 2021-05-12 NOTE — PROGRESS NOTES
TRANSFER - IN REPORT:    Verbal report received from Kaylah RN(name) on [facility]ass[facility] Public  being received from pacu(unit) for routine post - op      Report consisted of patients Situation, Background, Assessment and   Recommendations(SBAR). Information from the following report(s) SBAR, Kardex, Procedure Summary, Intake/Output, MAR and Recent Results was reviewed with the receiving nurse. Opportunity for questions and clarification was provided. Assessment completed upon patients arrival to unit and care assumed.

## 2021-05-12 NOTE — OP NOTES
[facility]  OPERATIVE REPORT    Name:  Betty Bender  MR#:  [id number]  :  1965  ACCOUNT #:  [de-identified]  DATE OF SERVICE:  2021    PREOPERATIVE DIAGNOSIS:  Spondylolisthesis and spinal stenosis, L5-S1. POSTOPERATIVE DIAGNOSIS:  Spondylolisthesis and spinal stenosis, L5-S1. PROCEDURES PERFORMED:  1. Left L5-S1 laminectomy, facetectomy, foraminotomy and diskectomy. 2.  Transforaminal lumbar interbody fusion, L5-S1 with Orthofix expandable cage, OsteoAMP bone marrow aspirate and autograft bone. 3.  Pedicle screw fusion, Orthofix L5-S1.  4.  Bone marrow aspiration L5 vertebra. 5.  Morselized autograft harvest.  6.  Continuous intraoperative EMG monitoring. 7.  Continuous intraoperative fluoroscopy. SURGEON:  Isidra Pacheco. Safia Oneill MD    ASSISTANT:  None. ANESTHESIA:  General endotracheal.    COMPLICATIONS:  None. SPECIMENS REMOVED:  L5-S1 disc. IMPLANTS:  See below. ESTIMATED BLOOD LOSS:  Minimal.    PREPARATION:  ChloraPrep. HISTORY OF PRESENT ILLNESS:  A 82-year-old gentleman with a 3-1/2-year history of low back pain and lower extremity pain, left greater than right, refractory to conservative measures. MRI scan was positive for grade I spondylolisthesis,, and spinal stenosis at the L5-S1 level. He failed aggressive conservative measures for years and was admitted for surgery. Risk factors included morbid obesity, diabetes, hyperlipidemia. OPERATIVE NOTE:  The patient was brought to the operating room, was carefully placed under general endotracheal anesthesia without complications. Guzman catheter, thigh-high pneumatic hose, MARGE hose and intraoperative EMG leads were placed on the legs. He was carefully turned prone on the Selvin frame on top of the OSI bed and the posterior aspect of back was shaved and prepped in usual sterile fashion.   An incision was made from L4-S2 in the midline and muscles were stripped in the left lateral subperiosteal plane with cautery and elevators and deep retractors were placed. Lateral lumbar spine x-ray confirmed spondylolisthesis L5-S1, narrow disc space and an instrument pointing correctly at that level. Left L5-S1 laminectomy and facetectomy were carried out with 2 and 3-mm Kerrison rongeurs and Leksell rongeurs. Significant bony ligamentous hypertrophy were present throughout. Eventually, the dural sac and nerve root were decompressed and mobilized medially to expose the disc space, confirmed by fluoroscopy. It was incised with 15-blade at a steep angle. It was cleaned with pituitary rongeurs and 6 and 7 side-biting curettes as well as rasps. Large pituitary rongeurs were used to remove the disc material.  This was covered for later re-exposure with cottonoids and thrombin-soaked Gelfoam.    Next, the L5 pedicle was entered with a straight awl, curved monitoring probe and a ball-tip probe. No EMG changes were noted negative to 20 impedance. A Jamshidi needle was placed and 5 mL of bone marrow were aspirated and mixed with OsteoAMP on the back table. The bone that was removed was denuded of soft tissue, morselized into small pieces and mixed in with the OsteoAMP and bone marrow aspirate. A 5.5 x 55-mm screw was placed under direct vision through the pedicle into the vertebral body without EMG changes or complications noted by AP and lateral fluoroscopy. Next, the dural sac and nerve were mobilized medially. The disc space was again cleaned with pituitary rongeur. The bone gun, which has OsteoAMP and bone marrow aspirate and autograft bone was then placed over the disc space and the bone matrix was then secured with a bone tamp deep into the disc space anteriorly. A 6.5-mm expandable Orthofix cage was passed into the disc space under direct vision with fluoroscopy confirmation. It was expanded to a height of 12 mm with good endplate contact and distraction noted.   Next, the S1 pedicle was entered with a straight awl, curved monitoring probe and a ball-tip probe and no EMG changes were noted to -20. A 5.5 x 40-mm screw was placed using Orthofix. At this point, the screw head was tested at both levels and it was -20 impedance. AP and lateral fluoroscopy confirmed good trajectory of the screws in good position of the graft and cage. A 35-mm prebent suzan was placed and Universal head locking caps were placed and tightened with a torque wrench screwdriver. At this point, the wound was copiously irrigated until clear. AP and lateral x-rays were obtained for final pictures and showed good position of the graft and hardware. DuraSeal was placed in the epidural space for hemostasis. It should be noted that the patient's tissues were very unhealthy and bled freely during the surgery. However, at the completion of the operation, the tissues were dry and so was the wound. A Segundo-Chacon drain was placed and brought out through a stab incision inferiorly and secured with 3-0 Vicryl suture. Surgiflo was placed for additional hemostasis. No further bleeding was encountered and the wound was closed. The fascia was closed tightly with O- Vicryl. Subcutaneous tissues were closed with interrupted 3-0 Vicryl. Skin was closed with staples. Sterile dressings were placed. The drain was hooked to a bulb suction. The patient tolerated the procedure well, was turned supine, awakened, extubated, and taken to PACU in stable condition.   There were no obvious complications        MD SUNNY [email or web address]  D:  05/12/2021 11:10  T:  05/12/2021 13:06  JOB #:  [id number]

## 2021-05-12 NOTE — ANESTHESIA PROCEDURE NOTES
Arterial Line Placement    Start time: 5/12/2021 7:44 AM  End time: 5/12/2021 7:48 AM  Performed by: Bert Velez MD  Authorized by: Bert Velez MD     Pre-Procedure  Timeout Time: 07:44        Procedure:   Prep:  Betadine  Seldinger Technique?: Yes    Orientation:  Right  Location:  Radial artery  Catheter size:  20 G  Number of attempts:  1  Cont Cardiac Output Sensor: No      Assessment:   Post-procedure:  Line secured and sterile dressing applied  Patient Tolerance:  Patient tolerated the procedure well with no immediate complications

## 2021-05-12 NOTE — PROGRESS NOTES
05/12/21 1530   Dual Skin Pressure Injury Assessment   Dual Skin Pressure Injury Assessment WDL   Second Care Provider (Based on [address] [place] [address]) Mariposa RN   Skin Integumentary   Skin Integumentary (WDL) X   Skin Integrity Incision (comment)

## 2021-05-12 NOTE — PERIOP NOTES
TRANSFER - OUT REPORT:    Verbal report given to Nicole Ureña RN on [facility]  being transferred to  for routine post - op       Report consisted of patients Situation, Background, Assessment and   Recommendations(SBAR). Information from the following report(s) SBAR, Kardex, Procedure Summary and Intake/Output was reviewed with the receiving nurse. Lines:   Peripheral IV 05/12/21 Left Hand (Active)   Site Assessment Clean, dry, & intact 05/12/21 1238   Phlebitis Assessment 0 05/12/21 1238   Infiltration Assessment 0 05/12/21 1238   Dressing Status Clean, dry, & intact 05/12/21 1238   Dressing Type Transparent;Tape 05/12/21 1238   Hub Color/Line Status Patent 05/12/21 1238       Peripheral IV 05/12/21 Right Hand (Active)   Site Assessment Clean, dry, & intact 05/12/21 1238   Phlebitis Assessment 0 05/12/21 1238   Infiltration Assessment 0 05/12/21 1238   Dressing Status Clean, dry, & intact 05/12/21 1238   Dressing Type Transparent;Tape 05/12/21 1238   Hub Color/Line Status Patent 05/12/21 1238        Opportunity for questions and clarification was provided. Patient transported with:   Tech    VTE prophylaxis orders have been written for [facility]. Patient and family given floor number and nurses name. Family updated re: pt status after security code verified.

## 2021-05-12 NOTE — ANESTHESIA PREPROCEDURE EVALUATION
Relevant Problems   ENDOCRINE   (+) Morbid obesity (HCC)       Anesthetic History   No history of anesthetic complications            Review of Systems / Medical History  Patient summary reviewed and pertinent labs reviewed    Pulmonary        Sleep apnea: CPAP           Neuro/Psych   Within defined limits           Cardiovascular    Hypertension: well controlled          CAD, CABG (2/20 x 5) and hyperlipidemia    Exercise tolerance: >4 METS  Comments: EF 50%   GI/Hepatic/Renal     GERD: well controlled           Endo/Other    Diabetes: well controlled, type 2  Hypothyroidism: well controlled  Morbid obesity and cancer (thyroid)     Other Findings   Comments: COVID 3/21         Physical Exam    Airway  Mallampati: III  TM Distance: 4 - 6 cm  Neck ROM: normal range of motion   Mouth opening: Normal     Cardiovascular    Rhythm: regular  Rate: normal         Dental    Dentition: Caps/crowns  Comments: Caps front teeth in poor condition, broken tooth upper left   Pulmonary  Breath sounds clear to auscultation               Abdominal  GI exam deferred       Other Findings            Anesthetic Plan    ASA: 3  Anesthesia type: general    Monitoring Plan: Arterial line      Induction: Intravenous  Anesthetic plan and risks discussed with: Patient      glidescope

## 2021-05-12 NOTE — ANESTHESIA POSTPROCEDURE EVALUATION
Procedure(s):  L5 S1 TLIF.    general    Anesthesia Post Evaluation      Multimodal analgesia: multimodal analgesia used between 6 hours prior to anesthesia start to PACU discharge  Patient location during evaluation: bedside  Patient participation: complete - patient participated  Level of consciousness: awake  Pain score: 4  Pain management: adequate  Airway patency: patent  Anesthetic complications: no  Cardiovascular status: acceptable and stable  Respiratory status: acceptable and room air  Hydration status: acceptable  Comments: Treated for hyperglycemia in PACU  Post anesthesia nausea and vomiting:  none  Final Post Anesthesia Temperature Assessment:  Normothermia (36.0-37.5 degrees C)      INITIAL Post-op Vital signs:   Vitals Value Taken Time   /67 05/12/21 1329   Temp 36.4 °C (97.5 °F) 05/12/21 1238   Pulse 75 05/12/21 1345   Resp 12 05/12/21 1309   SpO2 95 % 05/12/21 1345   Vitals shown include unvalidated device data.

## 2021-05-13 VITALS
WEIGHT: 315 LBS | HEIGHT: 72 IN | DIASTOLIC BLOOD PRESSURE: 72 MMHG | RESPIRATION RATE: 20 BRPM | TEMPERATURE: 99.1 F | OXYGEN SATURATION: 95 % | BODY MASS INDEX: 42.66 KG/M2 | SYSTOLIC BLOOD PRESSURE: 152 MMHG | HEART RATE: 85 BPM

## 2021-05-13 LAB
GLUCOSE BLD STRIP.AUTO-MCNC: 203 MG/DL (ref 65–100)
GLUCOSE BLD STRIP.AUTO-MCNC: 240 MG/DL (ref 65–100)
SERVICE CMNT-IMP: ABNORMAL
SERVICE CMNT-IMP: ABNORMAL

## 2021-05-13 PROCEDURE — 97530 THERAPEUTIC ACTIVITIES: CPT

## 2021-05-13 PROCEDURE — 74011250636 HC RX REV CODE- 250/636: Performed by: NEUROLOGICAL SURGERY

## 2021-05-13 PROCEDURE — 2709999900 HC NON-CHARGEABLE SUPPLY

## 2021-05-13 PROCEDURE — 97161 PT EVAL LOW COMPLEX 20 MIN: CPT

## 2021-05-13 PROCEDURE — 97166 OT EVAL MOD COMPLEX 45 MIN: CPT

## 2021-05-13 PROCEDURE — 97535 SELF CARE MNGMENT TRAINING: CPT

## 2021-05-13 PROCEDURE — 77030012893

## 2021-05-13 PROCEDURE — 74011250637 HC RX REV CODE- 250/637: Performed by: NEUROLOGICAL SURGERY

## 2021-05-13 PROCEDURE — 82962 GLUCOSE BLOOD TEST: CPT

## 2021-05-13 PROCEDURE — 74011636637 HC RX REV CODE- 636/637: Performed by: NEUROLOGICAL SURGERY

## 2021-05-13 RX ORDER — OXYCODONE AND ACETAMINOPHEN 10; 325 MG/1; MG/1
1 TABLET ORAL
Qty: 21 TAB | Refills: 0 | Status: SHIPPED | OUTPATIENT
Start: 2021-05-13 | End: 2021-05-20

## 2021-05-13 RX ORDER — CEFUROXIME AXETIL 500 MG/1
500 TABLET ORAL 2 TIMES DAILY
Qty: 14 TAB | Refills: 1 | Status: SHIPPED | OUTPATIENT
Start: 2021-05-13 | End: 2021-06-01 | Stop reason: ALTCHOICE

## 2021-05-13 RX ADMIN — LORATADINE 10 MG: 10 TABLET ORAL at 08:25

## 2021-05-13 RX ADMIN — PANTOPRAZOLE SODIUM 40 MG: 40 TABLET, DELAYED RELEASE ORAL at 08:26

## 2021-05-13 RX ADMIN — LEVOTHYROXINE SODIUM 25 MCG: 0.05 TABLET ORAL at 05:33

## 2021-05-13 RX ADMIN — INSULIN LISPRO 2 UNITS: 100 INJECTION, SOLUTION INTRAVENOUS; SUBCUTANEOUS at 07:30

## 2021-05-13 RX ADMIN — ATORVASTATIN CALCIUM 80 MG: 80 TABLET, FILM COATED ORAL at 08:25

## 2021-05-13 RX ADMIN — FENOFIBRATE 160 MG: 160 TABLET ORAL at 08:25

## 2021-05-13 RX ADMIN — OXYCODONE HYDROCHLORIDE AND ACETAMINOPHEN 1 TABLET: 10; 325 TABLET ORAL at 10:33

## 2021-05-13 RX ADMIN — Medication 1 AMPULE: at 08:25

## 2021-05-13 RX ADMIN — Medication 5 ML: at 05:32

## 2021-05-13 RX ADMIN — LEVOTHYROXINE SODIUM 200 MCG: 0.1 TABLET ORAL at 05:32

## 2021-05-13 RX ADMIN — METOPROLOL TARTRATE 12.5 MG: 25 TABLET, FILM COATED ORAL at 08:26

## 2021-05-13 RX ADMIN — SODIUM CHLORIDE, SODIUM LACTATE, POTASSIUM CHLORIDE, AND CALCIUM CHLORIDE 100 ML/HR: 600; 310; 30; 20 INJECTION, SOLUTION INTRAVENOUS at 03:48

## 2021-05-13 RX ADMIN — ASPIRIN 81 MG: 81 TABLET ORAL at 08:26

## 2021-05-13 RX ADMIN — GLIPIZIDE 10 MG: 5 TABLET ORAL at 08:26

## 2021-05-13 RX ADMIN — VANCOMYCIN HYDROCHLORIDE 2000 MG: 10 INJECTION, POWDER, LYOPHILIZED, FOR SOLUTION INTRAVENOUS at 09:15

## 2021-05-13 RX ADMIN — OXYCODONE HYDROCHLORIDE AND ACETAMINOPHEN 1 TABLET: 10; 325 TABLET ORAL at 03:47

## 2021-05-13 NOTE — DISCHARGE INSTRUCTIONS
[facility] [place]                        Lumbar (Back) Fusion Postoperative Home Instructions    - SHOWERING: You may shower the first day you are home with the dressing on. Do not soak in a tub for at least three weeks. If your dressing gets wet, change it according to the written instructions below. Use only soap and water on the incision and pat it dry. Do not scrub the incision.    - WOUND CARE: A small to moderate amount of reddish drainage on the dressing is normal the first 1-2 days after surgery. If your dressing becomes soaked with drainage, let your doctor know. [facility] HANDS BEFORE AND AFTER INCISION CARE.    - SIGNS OF INFECTION: Please notify your physician for any of the following: a temperature greater than 100.5, extreme tenderness at the wound, excessive redness and/or swelling, or large amounts of drainage from the wound. If you think you have a wound infection, call your physician's office immediately.    - SWALLOWING: Don't be alarmed if you have a sore throat for several days after surgery- this is normal. Eat only soft foods and drink plenty of fluids until your throat feels better. If you begin having trouble swallowing, call the office immediately. - DRIVING: You may not begin driving until after your visit to the physician's office for a wound and suture check. This is normally 7-10 days after you come home from the hospital.    - MEDICATIONS: You may not take anti-inflammatory medications. These include over-the-counter ibuprofen products such as Motrin, Advil, Aleve and other related medications or prescription medications such as Ultram, Naproxen, Indocin, or Celebrex and others. These medications slow down the bone healing. You may take Tylenol. The pain medicine prescribed may be taken as needed. You should continue taking a stool softener twice a day, drink plenty of water and eat high fiber foods to avoid constipation.  (This is a common problem patients experience while taking pain medicine). - DEEP BREATHING EXERCISES: Continue to use your incentive spirometer for deep breathing exercises. - SMOKING: Smoking will interfere with your healing. If you smoke, you may end up having another surgery or more problems.    - ACTIVITY: Avoid reaching overhead, particularly to lift things, until you have been told that your fusion has healed. Do not lift objects heavier than a coffee cup or a newspaper. You shouldn't lift anything heavier than 2-5 pounds. When lifting, you should bend with your knees and keep your back straight. Avoid bending at the waist. Sleeping may be difficult and sometimes requires you to change positions frequently; try to sleep with your head elevated 15-30 degrees. Log roll to turn in bed. Do not twist your back from side to side. You may remove your MARGE hose when consistently walking. You may do steps and inclines in moderation.    - LUMBAR CORSET (BACK BRACE): If your physician instructs you to wear one. Wear your corset when out of bed for a long period of time, when riding in a car or doing activities outside of your home. You do not have to wear your corset in the shower or in the restroom. Make sure the collar supports your chin and remains snug at all times. Your physician may give you other instructions as well. - SEXUAL RELATIONS: You may resume sexual relations 2 weeks after your surgery. - WALKING PROGRAM: After your sutures are removed or dissolved, it is very important that you begin a progressive walking program. Walking strengthens the spinal muscles and will help protect your disc and vertebrae. You will need to increase your walking program up to two miles per day over the next four weeks. You should begin slowly with 1/8 to 1/4 of a mile and add to this each day. Please be very consistent with your walking program, as this is a vital part of your recovery.     -SYMPTOMS AFTER SURGERY: Don't be alarmed if you still have some of the same symptoms you had prior to surgery. The nerves often require time to heal after the pressure has been relieved. The level of pain you experience should improve as your body heals. - FOLLOW-UP: An appointment will be made for you to follow-up with your surgeon or the office nurse. Please bring any X-rays of your neck to the appointment. Please call your physician's office if you have any other questions or problems. Listen to your body, it will tell you if you are overdoing it. Use common sense and take care of yourself!

## 2021-05-13 NOTE — PROGRESS NOTES
ACUTE PHYSICAL THERAPY GOALS:  (Developed with and agreed upon by patient and/or caregiver. )  LTG:  (1.)Mr. Richards will move from supine to sit and sit to supine , scoot up and down and roll side to side in bed with INDEPENDENT within 7 treatment day(s) through log rolling.    (2.)Mr. Richards will transfer from bed to chair and chair to bed with MODIFIED INDEPENDENCE using the least restrictive device within 7 treatment day(s). (3.)Mr. Rcihards will ambulate with MODIFIED INDEPENDENCE for 250 feet with the least restrictive device within 7 treatment day(s). (4.)Mr. Richards will perform 3 stairs with HR and SBA within 7 treatment days for ascending and descending stairs for home.   ________________________________________________________________________________________________       PHYSICAL THERAPY ASSESSMENT: Initial Assessment and AM PT Treatment Day # 1      Patti Santizo is a 50 y.o. male   PRIMARY DIAGNOSIS: Spondylolisthesis at L5-S1 level  Spondylolisthesis, lumbosacral region [M43.17]  Lumbar disc herniation [M51.26]  Morbid obesity (HCC) [E66.01]  Spondylolisthesis at L5-S1 level [M43.17]  Procedure(s) (LRB):  L5 S1 TLIF (N/A)  1 Day Post-Op  Reason for Referral:    ICD-10: Treatment Diagnosis: Difficulty in walking, Not elsewhere classified (R26.2)  INPATIENT: Payor: Leopoldo Hora / Plan: Larissa Coker / Product Type: PPO /     ASSESSMENT:     REHAB RECOMMENDATIONS:   Recommendation to date pending progress:  Setting:   No further skilled therapy   Equipment:    None     PRIOR LEVEL OF FUNCTION:  (Prior to Hospitalization) INITIAL/CURRENT LEVEL OF FUNCTION:  (Most Recently Demonstrated)   Bed Mobility:   Independent  Sit to Stand:   Independent  Transfers:   Independent  Gait/Mobility:   Independent Bed Mobility:   Contact Guard Assistance  Sit to Stand:  [facility] Assistance  Transfers:  Contact Guard Assistance to [address]:  [facility] to SBA     ASSESSMENT:  Mr. Devorah Phillip presents to hospital with above surgery on 5/12/21, now with spinal precautions. Pt A & O x 4, independent at baseline, works, drives. Pt lives with spouse and 3 grown children, will have assist at home at discharge. Pt educated on spinal precautions, log rolling technique. Pt performed supine to sit, sit to supine through log rolling with SBA/CGA. Pt CGA/SBA for transfers and ambulation with RW this date. Pt leaning on RW for support with ambulation, encouraged to use RW at home for now. Discussed maintaining spinal precautions with mobility. Pt overall doing well with general mobility, anticipate no needs for home. SUBJECTIVE:   Mr. Devorah Phillip states, \"I am ok. \"    SOCIAL HISTORY/LIVING ENVIRONMENT: independent at baseline  Home Environment: Private residence  # Steps to Enter: 3  One/Two Story Residence: One story  Living Alone: No  Support Systems: Child([place]), Spouse/Significant Other/Partner  OBJECTIVE:     PAIN: VITAL SIGNS: LINES/DRAINS:   Pre Treatment: Pain Screen  Pain Scale 1: Numeric (0 - 10)  Pain Intensity 1: 0  Post Treatment: 0 Vital Signs  O2 Device: None (Room air) IV and LUIS Drain  O2 Device: None (Room air)     GROSS EVALUATION:  B LE Within Functional Limits Abnormal/ Functional Abnormal/ Non-Functional (see comments) Not Tested Comments:   AROM [x] [] [] []    PROM [x] [] [] []    Strength [x] [] [] []    Balance [x] [] [] []    Posture [x] [] [] []    Sensation [x] [] [] []    Coordination [x] [] [] []    Tone [x] [] [] []    Edema [] [x] [] []    Activity Tolerance [] [x] [] []     [] [] [] []      COGNITION/  PERCEPTION: Intact Impaired   (see comments) Comments:   Orientation [x] []    Vision [x] []    Hearing [x] []    Command Following [x] []    Safety Awareness [x] []     [] []      MOBILITY: I Mod I S SBA CGA Min Mod Max Total  NT x2 Comments:   Bed Mobility    Rolling [] [] [] [x] [x] [] [] [] [] [] [] Log rolling   Supine to Sit [] [] [] [x] [x] [] [] [] [] [] []    Scooting [] [] [] [x] [] [] [] [] [] [] []    Sit to Supine [] [] [] [x] [x] [] [] [] [] [] []    Transfers    Sit to Stand [] [] [] [x] [x] [] [] [] [] [] []    Bed to Chair [] [] [] [x] [x] [] [] [] [] [] []    Stand to Sit [] [] [] [x] [] [] [] [] [] [] []    I=Independent, Mod I=Modified Independent, S=Supervision, SBA=Standby Assistance, CGA=Contact Guard Assistance,   Min=Minimal Assistance, Mod=Moderate Assistance, Max=Maximal Assistance, Total=Total Assistance, NT=Not Tested  GAIT: I Mod I S SBA CGA Min Mod Max Total  NT x2 Comments:   Level of Assistance [] [] [] [x] [x] [] [] [] [] [] []    Distance 250    DME Rolling Walker    Gait Quality Slow marie    Weightbearing Status N/A     I=Independent, Mod I=Modified Independent, S=Supervision, SBA=Standby Assistance, CGA=Contact Guard Assistance,   Min=Minimal Assistance, Mod=Moderate Assistance, Max=Maximal Assistance, Total=Total Assistance, NT=Not Tested    [facility] Form       How much difficulty does the patient currently have. .. Unable A Lot A Little None   1. Turning over in bed (including adjusting bedclothes, sheets and blankets)? [] 1   [] 2   [x] 3   [] 4   2. Sitting down on and standing up from a chair with arms ( e.g., wheelchair, bedside commode, etc.)   [] 1   [] 2   [x] 3   [] 4   3. Moving from lying on back to sitting on the side of the bed? [] 1   [] 2   [x] 3   [] 4   How much help from another person does the patient currently need. .. Total A Lot A Little None   4. Moving to and from a bed to a chair (including a wheelchair)? [] 1   [] 2   [x] 3   [] 4   5. Need to walk in hospital room? [] 1   [] 2   [x] 3   [] 4   6. Climbing 3-5 steps with a railing? [] 1   [] 2   [x] 3   [] 4   © 2007, Trustees of [facility] [employer], under license to [facility].  All rights reserved     Score:  Initial: 18 Most Recent: X (Date: -- ) Interpretation of Tool:  Represents activities that are increasingly more difficult (i.e. Bed mobility, Transfers, Gait). PLAN:   FREQUENCY/DURATION: PT Plan of Care: BID for duration of hospital stay or until stated goals are met, whichever comes first.    PROBLEM LIST:   (Skilled intervention is medically necessary to address:)  1. Decreased ADL/Functional Activities  2. Decreased Activity Tolerance  3. Decreased Balance  4. Decreased Coordination  5. Decreased Gait Ability  6. Decreased Transfer Abilities   INTERVENTIONS PLANNED:   (Benefits and precautions of physical therapy have been discussed with the patient.)  1. Therapeutic Activity  2. Therapeutic Exercise/HEP  3. Neuromuscular Re-education  4. Gait Training  5. Manual Therapy  6. Education     TREATMENT:     EVALUATION: Low Complexity : (Untimed Charge)    TREATMENT:   ($$ Therapeutic Activity: 23-37 mins    )  Therapeutic Activity (23 Minutes): Therapeutic activity included Supine to Sit, Sit to Supine, Scooting, Transfer Training, Ambulation on level ground, Sitting balance , Standing balance and walker safety to improve functional Mobility, Strength and Activity tolerance.     TREATMENT GRID:  N/A    AFTER TREATMENT POSITION/PRECAUTIONS:  Chair, Needs within reach and RN notified    INTERDISCIPLINARY COLLABORATION:  RN/PCT, PT/PTA and OT/HODGE    TOTAL TREATMENT DURATION:  PT Patient Time In/Time Out  Time In: 3729  Time Out: Stu Jimenez 89, PT

## 2021-05-13 NOTE — PROGRESS NOTES
Chart screened by  for potential discharge needs or concerns. None identified at this time. PT/OT evals complete with no continued therapy recommended/needed. Please notify/consult  if any discharge needs arise. Care Management Interventions  PCP Verified by CM: Yes  Mode of Transport at Discharge:  Other (see comment)(family)  Discharge Durable Medical Equipment: No  Physical Therapy Consult: Yes  Occupational Therapy Consult: Yes  Speech Therapy Consult: No  Current Support Network: Own Home, Lives with Spouse  Confirm Follow Up Transport: Family  Discharge Location  Discharge Placement: Home

## 2021-05-13 NOTE — PROGRESS NOTES
9573 - Report received from night shift RN. Patient assessed. No distress at this time. Will continue to monitor. 9527 - Patient is OOB with PT/OT. Cleared for DC by PT/OT. 7500 - Family at bedside updated at this time. Questions asked and answered. Further questions denied.

## 2021-05-13 NOTE — PROGRESS NOTES
NS  POD # 1  AFEBRILE  DRY DRESSINGS  5/5 POWER  MINIMAL PAIN  LUIS: 150 ML  A/P PT/OT  TD HOME  WITH DRAIN  F/U IN OFFICE NEXT Calli Lam MD

## 2021-05-13 NOTE — PROGRESS NOTES
ACUTE OT GOALS:  (Developed with and agreed upon by patient and/or caregiver.)  1. Patient will verbalize and demonstrate understanding of spinal precautions with 100% accuracy during ADLs. 2. Patient will complete lower body bathing and dressing with mod I and adaptive equipment as needed. 3. Patient will complete functional transfers with mod I and adaptive equipment as needed. 4. Patient will complete toileting and toilet transfer with mod I.   5. Patient will complete functional mobility of household distances with mod I and adaptive equipment as needed.    6. Patient will demonstrate ability to log roll in bed with mod I and no verbal cues from therapist.     Timeframe: 7 visits     OCCUPATIONAL THERAPY ASSESSMENT: Initial Assessment, Daily Note and AM   OT Treatment Day # 1   Back & Spinal precautions    Pete Galloway is a 50 y.o. male   PRIMARY DIAGNOSIS: Spondylolisthesis at L5-S1 level  Spondylolisthesis, lumbosacral region [M43.17]  Lumbar disc herniation [M51.26]  Morbid obesity ([facility] [address] [E66.01]  Spondylolisthesis at L5-S1 level [M43.17]  Procedure(s) (LRB):  L5 S1 TLIF (N/A)  1 Day Post-Op  Reason for Referral:  weakness  ICD-10: Treatment Diagnosis: Generalized Muscle Weakness (M62.81)  Other lack of cordination (R27.8)  Difficulty in walking, Not elsewhere classified (R26.2)  Other abnormalities of gait and mobility (R26.89)  INPATIENT: Payor: [employer] / [facility] / Product Type: PPO /   ASSESSMENT:     REHAB RECOMMENDATIONS:   Recommendation to date pending progress:  Setting:   No further skilled therapy   Equipment:    wife has borrowed SC, RW, shower chair, and \"some other things\", recommend hip kit     PRIOR LEVEL OF FUNCTION:  (Prior to Hospitalization)  INITIAL/CURRENT LEVEL OF FUNCTION:  (Based on today's evaluation)   Bathing:   Independent  Dressing:   Independent  Feeding/Grooming:   Independent  Toileting:   Independent  Functional Mobility:   Independent Bathing:   Minimal Assistance LB tasks needs help  Dressing:   Moderate Assistance with LB AE, needs practice and help  Feeding/Grooming:   Independent  Toileting:   Moderate Assistance for wiping, setup urinal  Functional Mobility:   Minimal Assistance with RW     ASSESSMENT:  Mr. Le Colin is a 51 YO L dominant WM s/p above surgery on 5/12/2021, now has spinal and back precautions. Pt independent at baseline, A & O x4, working, driving. Lives with wife and 3 adult children who will all be able to help him at home. Educated on and practiced log roll technique, back precautions with good understanding, but needs review, CGA/SBA overall. Practiced with LB AE but still needing some help. Issued LH bath sponge for home use. Completed sponge bath and dressing from EOB and ambulated in to hallway with CGA/SBA and RW. Discussed maintaining spinal precautions ADLs. Pt doing well, address above goals while still in acute care. No further needs anticipated after discharge. SUBJECTIVE:   Mr. Le Colin states, \"I don't really have much pain now. 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 No falls. Has no DME, but wife has borrowed DME to include shower chair, SC, and RW and possibly reacher, LH shoe horn and sock aide. OT issued LH bath sponge.    Home Environment: Private residence  # Steps to Enter: 3  One/Two Story Residence: One story  Living Alone: No  Support Systems: Child([place]), Spouse/Significant Other/Partner      OBJECTIVE:     PAIN: VITAL SIGNS: LINES/DRAINS:   Pre Treatment: Pain Screen  Pain Scale 1: Numeric (0 - 10)  Pain Intensity 1: 3  Pain Onset 1: post op  Pain Location 1: Back  Pain Orientation 1: Lower  Pain Description 1: Aching  Pain Intervention(s) 1: Ambulation/Increased Activity; Distraction; Emotional support; Environmental changes;Food;Nurse notified;Relaxation technique;Repositioned;Rest;Therapeutic presence; Therapeutic touch; Other (comment)(sponge bath from EOB)  Patient Stated Pain Goal: 03/10  Post Treatment: unchanged   LUIS Drain  O2 Device: None (Room air)     GROSS EVALUATION:  B UEs, L dominant Within Functional Limits Abnormal/ Functional Abnormal/ Non-Functional (see comments) Not Tested Comments:   AROM [x] [] [] []    PROM [] [] [] [x]    Strength [x] [] [] []    Balance [x] [] [] []    Posture [x] [] [] []    Sensation [x] [] [] []    Coordination [x] [] [] []    Tone [x] [] [] []    Edema [x] [] [] []    Activity Tolerance [x] [] [] []     [] [] [] []      COGNITION/  PERCEPTION: Intact Impaired   (see comments) Comments:   Orientation [x] [] x4   Vision [x] [] With glasses   Hearing [x] []    Judgment/ Insight [x] []    Attention [x] []    Memory [x] []    Command Following [x] []    Emotional Regulation [x] []     [] []      ACTIVITIES OF DAILY LIVING: I Mod I S SBA CGA Min Mod Max Total NT Comments   BASIC ADLs:              Bathing/ Showering [] [] [] [] [] [] [x] [] [] [] LB only,  Issued LH bath sponge   Toileting [] [] [x] [] [] [] [x] [] [] [] For wiping after BM, otherwise using urinal.    Dressing [] [] [] [] [] [] [x] [] [] [] Socks and shoes with LB AE   Feeding [x] [] [] [] [] [] [] [] [] []    Grooming [] [] [] [x] [] [] [] [] [] []    Personal Device Care [] [] [] [] [] [] [] [] [] [x]    Functional Mobility [] [] [] [x] [x] [] [] [] [] [] With RW   I=Independent, Mod I=Modified Independent, S=Supervision, SBA=Standby Assistance, CGA=Contact Guard Assistance,   Min=Minimal Assistance, Mod=Moderate Assistance, Max=Maximal Assistance, Total=Total Assistance, NT=Not Tested    MOBILITY: I Mod I S SBA CGA Min Mod Max Total  NT x2 Comments:   Supine to sit [] [] [] [x] [x] [] [] [] [] [] [] With log roll   Sit to supine [] [] [] [x] [x] [] [] [] [] [] []    Sit to stand [] [] [] [x] [x] [] [] [] [] [] [] To RW   Bed to chair [] [] [] [] [x] [] [] [] [] [] [] With RW   I=Independent, Mod I=Modified Independent, S=Supervision, SBA=Standby Assistance, CGA=Contact Guard Assistance,   Min=Minimal Assistance, Mod=Moderate Assistance, Max=Maximal Assistance, Total=Total Assistance, NT=Not Tested    [facility]   Daily Activity Inpatient Short Form        How much help from another person does the patient currently need. .. Total A Lot A Little None   1. Putting on and taking off regular lower body clothing? [] 1   [x] 2   [] 3   [] 4   2. Bathing (including washing, rinsing, drying)? [] 1   [x] 2   [] 3   [] 4   3. Toileting, which includes using toilet, bedpan or urinal?   [] 1   [] 2   [x] 3   [] 4   4. Putting on and taking off regular upper body clothing? [] 1   [] 2   [x] 3   [] 4   5. Taking care of personal grooming such as brushing teeth? [] 1   [] 2   [x] 3   [] 4   6. Eating meals? [] 1   [] 2   [] 3   [x] 4   © 2007, Trustees of [facility], under license to [employer]. All rights reserved     Score:  Initial: 17, completed 5/13/2021 Most Recent: X (Date: -- )   Interpretation of Tool:  Represents activities that are increasingly more difficult (i.e. Bed mobility, Transfers, Gait). PLAN:   FREQUENCY/DURATION: OT Plan of Care: 3 times/week for duration of hospital stay or until stated goals are met, whichever comes first.    PROBLEM LIST:   (Skilled intervention is medically necessary to address:)  1. Decreased ADL/Functional Activities  2. Decreased Activity Tolerance  3. Decreased AROM/PROM  4. Decreased Balance  5. Decreased Coordination  6. Decreased Gait Ability  7. Decreased Strength  8. Decreased Transfer Abilities  9. Increased Pain   INTERVENTIONS PLANNED:   (Benefits and precautions of occupational therapy have been discussed with the patient.)  1.  Self Care Training  2. Therapeutic Activity  3. Therapeutic Exercise/HEP  4. Neuromuscular Re-education  5. Education     TREATMENT:     EVALUATION: Moderate Complexity : (Untimed Charge)    TREATMENT:   ($$ Self Care/Home Management: 38-52 mins    )  Co-Treatment PT/OT necessary due to patient's decreased overall endurance/tolerance levels, as well as need for high level skilled assistance to complete functional transfers/mobility and functional tasks  Self Care (55 Minutes): Self care including Upper Body Bathing, Lower Body Bathing, Toileting, Upper Body Dressing, Lower Body Dressing, Self Feeding, Grooming, ADL Adaptive Equipment Training and back precautions and LB AE to increase independence and decrease level of assistance required.     TREATMENT GRID:  N/A    AFTER TREATMENT POSITION/PRECAUTIONS:  Alarm Activated, Chair and RN notified    INTERDISCIPLINARY COLLABORATION:  RN/PCT, PT/PTA, OT/HODGE and RN Case Manager/     TOTAL TREATMENT DURATION: 46 mins  OT Patient Time In/Time Out  Time In: 7878  Time Out: [address], OT    Dante Sahu MS, OTR/L

## 2021-05-13 NOTE — PROGRESS NOTES
Discharge instructions given. Questions asked and answered. Taught patient how to measure and empty LUIS drain. Back dressing reinforced by charge RN .

## 2021-05-13 NOTE — DIABETES MGMT
Patient admitted with Spondylolisthesis and spinal stenosis, L5-S1 s/p procedure. Patient to discharge today. Noted A1c 9.1 (). Blood glucose ranged 161-262 yesterday with patient receiving Lantus 40 units, Humalog 2 units, Regular 8 units, Alogliptin 25mg, Glipizide 10mg, and Decadron 10mg. Blood glucose this morning was 203. Patient seen for assessment regarding diabetes management. Patient has a past medical history of CABG, type 2 diabetes, hyperlipidemia, HTN, lung surgery, BIPIN. Patient states they were diagnosed 25 years ago with diabetes and voices a positive family history of diabetes. Patient states they do have a working glucometer with supplies at home. Per patient they typically check blood glucose levels 3 times a day. Per patient their blood glucose levels have been running 200s (\"when I'm not making good diet choices\") 130s (\"when watching what I eat\") at home. Patient states they are currently taking Januvia nightly, Glipizide BID, Farxiga daily, and 70/30 insulin 30-40 units TID at home for management of diabetes. Patient has attended formal diabetes education in the past. Patient reports no difficulty with affording their diabetic supplies. Patient given educational material, \"Diabetes Self-Management: A Patient Teaching Guide. \" Discussed significance of HbA1c patient states he thinks his A1c was 8.1. Discussed target goals for blood glucose and A1C. Discussed barriers to compliance. Patient states \"food is my vice I don't drink I don't smoke, I'm addicted to food. \" Patient denies history of hypoglycemia. Described the effects of poor glycemic control and the development of long-term complications such as renal, eye, nerve, and cardiovascular disease. Patient given educational material regarding Healthy Meal Planning, carb counting, plate method, and effects of unsweetened beverages on glycemic control. Encouraged portion control.  Educated patient regarding the benefits of physical activity (as cleared by provider) on glycemic control. Also explained the relationship between hyperglycemia and infection and delayed healing. Patient would likely benefit from continued diabetes outpatient education. Patient provided with contact information to [facility] program to pursue at their convenience after discharge with their primary care provider but politely declined referral at this time. Discussed mixed insulin versus basal/bolus insulin. Patient states his 70/30 insulin pens are about $25 per month through his insurance. Patient also educated regarding insulin affordability programs. Patient he struggles with night time snacking stating he falls asleep in his recliner wakes up at 1am and then will have a snack and go back to bed so his morning glucose levels are 200s. Discussed short acting insulin and encouraged to discuss this with PCP. Encouraged compliance with discharge regimen. Encouraged patient to continue to work on lifestyle modifications and to follow up with primary care provider for further titration of regimen. Patient verbalized understanding and voices no further questions regarding diabetes management at this time.

## 2021-05-13 NOTE — PROGRESS NOTES
Problem: Diabetes Self-Management  Goal: *Disease process and treatment process  Description: Define diabetes and identify own type of diabetes; list 3 options for treating diabetes. Outcome: Progressing Towards Goal  Goal: *Incorporating nutritional management into lifestyle  Description: Describe effect of type, amount and timing of food on blood glucose; list 3 methods for planning meals. Outcome: Progressing Towards Goal  Goal: *Incorporating physical activity into lifestyle  Description: State effect of exercise on blood glucose levels. Outcome: Progressing Towards Goal  Goal: *Developing strategies to promote health/change behavior  Description: Define the ABC's of diabetes; identify appropriate screenings, schedule and personal plan for screenings. Outcome: Progressing Towards Goal  Goal: *Using medications safely  Description: State effect of diabetes medications on diabetes; name diabetes medication taking, action and side effects. Outcome: Progressing Towards Goal  Goal: *Monitoring blood glucose, interpreting and using results  Description: Identify recommended blood glucose targets  and personal targets. Outcome: Progressing Towards Goal  Goal: *Prevention, detection, treatment of acute complications  Description: List symptoms of hyper- and hypoglycemia; describe how to treat low blood sugar and actions for lowering  high blood glucose level. Outcome: Progressing Towards Goal  Goal: *Prevention, detection and treatment of chronic complications  Description: Define the natural course of diabetes and describe the relationship of blood glucose levels to long term complications of diabetes.   Outcome: Progressing Towards Goal  Goal: *Developing strategies to address psychosocial issues  Description: Describe feelings about living with diabetes; identify support needed and support network  Outcome: Progressing Towards Goal     Problem: Falls - Risk of  Goal: *Absence of Falls  Description: Document Gui Fall Risk and appropriate interventions in the flowsheet.   Outcome: Progressing Towards Goal  Note: Fall Risk Interventions:  Mobility Interventions: Bed/chair exit alarm, OT consult for ADLs, Patient to call before getting OOB, PT Consult for mobility concerns         Medication Interventions: Bed/chair exit alarm, Evaluate medications/consider consulting pharmacy, Teach patient to arise slowly, Patient to call before getting OOB    Elimination Interventions: Call light in reach, Bed/chair exit alarm, Patient to call for help with toileting needs, Stay With Me (per policy), Urinal in reach              Problem: Patient Education: Go to Patient Education Activity  Goal: Patient/Family Education  Outcome: Progressing Towards Goal     Problem: Pain  Goal: *Control of Pain  Outcome: Progressing Towards Goal     Problem: Patient Education: Go to Patient Education Activity  Goal: Patient/Family Education  Outcome: Progressing Towards Goal     Problem: Patient Education: Go to Patient Education Activity  Goal: Patient/Family Education  Outcome: Progressing Towards Goal     Problem: Complex Spine Procedure:  Day of Surgery  Goal: Activity/Safety  Outcome: Progressing Towards Goal  Goal: Consults, if ordered  Outcome: Progressing Towards Goal  Goal: Diagnostic Test/Procedures  Outcome: Progressing Towards Goal  Goal: Nutrition/Diet  Outcome: Progressing Towards Goal  Goal: Discharge Planning  Outcome: Progressing Towards Goal  Goal: Medications  Outcome: Progressing Towards Goal  Goal: Respiratory  Outcome: Progressing Towards Goal  Goal: Treatments/Interventions/Procedures  Outcome: Progressing Towards Goal  Goal: Psychosocial  Outcome: Progressing Towards Goal  Goal: *Optimal pain control at patient's stated goal  Outcome: Progressing Towards Goal  Goal: *Demonstrates progressive activity  Outcome: Progressing Towards Goal  Goal: *Respiratory status stable  Outcome: Progressing Towards Goal

## 2021-06-29 ENCOUNTER — HOSPITAL ENCOUNTER (OUTPATIENT)
Dept: GENERAL RADIOLOGY | Age: 56
Discharge: HOME | End: 2021-06-29
Payer: COMMERCIAL

## 2021-06-29 DIAGNOSIS — M51.26 HNP (HERNIATED NUCLEUS PULPOSUS), LUMBAR: ICD-10-CM

## 2021-06-29 DIAGNOSIS — M43.17 SPONDYLOLISTHESIS AT L5-S1 LEVEL: ICD-10-CM

## 2021-06-29 DIAGNOSIS — E66.01 MORBID OBESITY (HCC): ICD-10-CM

## 2021-06-29 PROCEDURE — 72100 X-RAY EXAM L-S SPINE 2/3 VWS: CPT

## 2021-08-04 NOTE — BRIEF OP NOTE
Brief Postoperative Note    Patient: Myesha Beal  YOB: 1965  MRN: [id number]    Date of Procedure: 5/12/2021     Pre-Op Diagnosis: Spondylolisthesis, lumbosacral region [M43.17]  Lumbar disc herniation [M51.26]  Morbid obesity ([address] [E66.01]    Post-Op Diagnosis: SAME    Procedure(s):  L5 S1 TLIF    Surgeon(s):  Van Alejandro MD    Surgical Assistant: None    Anesthesia: General     Estimated Blood Loss (mL): 834  ML    Complications: NONE    Specimens:   ID Type Source Tests Collected by Time Destination   1 : L5-S1 disc material Preservative Disc Material  Van Alejandro MD 5/12/2021 1002 Pathology        Implants:   Implant Name Type Inv. Item Serial No.  Lot No. LRB No. Used Action   GRAFT BNE SUB 25CC 2MM GRAN ALLOGENIC MORPHOGENETIC PROT W - [id number]  GRAFT BNE SUB 25CC 2MM GRAN ALLOGENIC MORPHOGENETIC PROT W [id number] [employer]WD  N/A 1 Implanted   CELLERATE   [id number]  [id number] N/A 1 Implanted   CAGE SPNL [id number] NONLORDOTIC FORZA XP - [id number]  CAGE SPNL [id number] NONLORDOTIC FORZA XP  ORTHOFIX SPINAL IMPLANTS_WD [id number] N/A 1 Implanted   SCREW SPNL L50MM OD5.5MM FARHANA ST SELF DRL NONCANNULATED FULL - [id number]  SCREW SPNL L50MM OD5.5MM FARHANA ST SELF DRL NONCANNULATED FULL  ORTHOFIX SPINAL IMPLANTS_WD [id number] N/A 1 Implanted   CROSSLINK SPNL BODY TOP LD NXG - [id number]  CROSSLINK SPNL BODY TOP LD NXG  ORTHOFIX SPINAL IMPLANTS_WD [id number] N/A 2 Implanted   SET SCR SPNL DIA5.5-6MM STD BTM LEV FIREBIRD NXG - [id number]  SET SCR SPNL DIA5.5-6MM STD BTM LEV FIREBIRD NXG  ORTHOFIX SPINAL IMPLANTS_WD [id number] N/A 2 Implanted   CORNELL SPNL L35MM DIA5. 5MM TI Humberto Gomez - [id number]  CORNELL SPNL L35MM [address] 5MM TI THORLUM PRELORDOSED FIREBIRD  ORTHOFIX SPINAL IMPLANTS_WD [id number] N/A 1 Implanted   SCREW SPNL L40MM OD5.5MM FARHANA ST SELF DRL NONCANNULATED FULL - [id number]  SCREW SPNL L40MM OD5.5MM FARHANA ST SELF DRL CONTROLLED SUBSTANCE MEDICATION AGREEMENT  Patient Name: Rene Wiseman  Patient YOB: 1954     I understand, that controlled substance medications may be used to help better manage my symptoms and to improve my ability to function at home, work and in social settings. However, I also understand that these medications do have risks, which have been discussed with me, including possible development of physical or psychological dependence. I understand that successful treatment requires mutual trust and honesty between me and my provider. I understand and agree that following this Medication Agreement is necessary in continuing my provider-patient relationship and the success of my treatment plan. Explanation from my Provider: Benefits and Goals of Controlled Substance Medications: There are two potential goals for your treatment: (1) decreased pain and suffering (2) improved daily life functions. There are many possible treatments for your chronic condition(s). Alternatives such as physical therapy, yoga, massage, home daily exercise, meditation, relaxation techniques, injections, chiropractic manipulations, surgery, cognitive therapy, hypnosis and many medications that are not habit-forming may be used. Use of controlled substance medications may be helpful, but they are unlikely to resolve all symptoms or restore all function. Explanation from my Provider: Risks of Controlled Substance Medications:   Opioid pain medications: These medications can lead to problems such as addiction/dependence, sedation, lightheadedness/dizziness, memory issues, falls, constipation, nausea, or vomiting. They may also impair the ability to drive or operate machinery. Additionally, these medications may lower testosterone levels, leading to loss of bone strength, stamina and sex drive.   They may cause problems with breathing, sleep apnea and reduced coughing, which is especially dangerous for patients with lung NONCANNULATED FULL  ORTHOFIX SPINAL IMPLANTS_WD [id number] N/A 1 Implanted       Drains: * No LDAs found *    Findings: STENOSIS     Electronically Signed by Deann Martinez MD on 5/12/2021 at 10:39 AM disease. Overdose or dangerous interactions with alcohol and other medications may occur, leading to death. Hyperalgesia may develop, which means patients receiving opioids for the treatment of pain may become more sensitive to certain painful stimuli, and in some cases, experience pain from ordinarily non-painful stimuli. Women between the ages of 14-53 who could become pregnant should carefully weigh the risks and benefits of opioids with their physicians, as these medications increase the risk of pregnancy complications, including miscarriage,  delivery and stillbirth. It is also possible for babies to be born addicted to opioids. Opioid dependence withdrawal symptoms may include; feelings of uneasiness, increased pain, irritability, belly pain, diarrhea, sweats and goose-flesh. Testosterone replacement therapy:  Potential side effects include increased risk of stroke and heart attack, blood clots, increased blood pressure, increased cholesterol, enlarged prostate, sleep apnea, irritability/aggression and other mood disorders, and decreased fertility. Hien Hobbs (1954)             Page 1 of 4    Initials:_______    Benzodiazepines and non-benzodiazepine sleep medications: These medications can lead to problems such as addiction/dependence, sedation, fatigue, lightheadedness, dizziness, incoordination, falls, depression, hallucinations, and impaired judgment, memory and concentration. The ability to drive and operate machinery may also be affected. Abnormal sleep-related behaviors have been reported, including sleepwalking, driving, making telephone calls, eating, or having sex while not fully awake. These medications can suppress breathing and worsen sleep apnea, particularly when combined with alcohol or other sedating medications, potentially leading to death. Dependence withdrawal symptoms may include tremors, anxiety, hallucinations and seizures.    Stimulants:  Common adverse effects include addiction/dependence, increased blood pressure and heart rate, decreased appetite, nausea, involuntary weight loss, insomnia,  irritability, and headaches. These risks may increase when these medications are combined with other stimulants, such as caffeine pills or energy drinks, certain weight loss supplements and oral decongestants. Dependence withdrawal symptoms may include depressed mood, loss of interest, suicidal thoughts, anxiety, fatigue, appetite changes and agitation. I agree and understand that I and my prescriber have the following rights and responsibilities regarding my treatment plan:   1. MY RIGHTS:  To be informed of my treatment and medication plan. To be an active participant in my health and wellbeing. 2. MY RESPONSIBILITY AND UNDERSTANDING FOR USE OF MEDICATIONS   I will take medications at the dose and frequency as directed. For my safety, I will not increase or change how I take my medications without the recommendation of my healthcare provider.  I will actively participate in any program recommended by my provider which may improve function, including social, physical, psychological programs.  I will not take my medications with alcohol or other drugs not prescribed to me. I understand that drinking alcohol with my medications increases the chances of side effects, including reduced breathing rate and could lead to personal injury when operating machinery.  I understand that if I have a history of substance use disorders, including alcohol or other illicit drugs, that I may be at increased risk of addiction to my medications.  I agree to notify my provider immediately if I should become pregnant so that my treatment plan can be adjusted.    I agree and understand that I shall only receive controlled substance medications from the prescriber that signed this agreement unless there is written agreement among other prescribers of controlled substances outlining the responsibility of the medications being prescribed.  I understand that the if the controlled medication is not helping to achieve goals, the dosage may be tapered and no longer prescribed. 3. MY RESPONSIBILITY FOR COMMUNICATION / PRESCRIPTION RENEWALS   I agree that all controlled substance medications that I take will be prescribed only by my provider. If another healthcare provider prescribes me medication in an emergency, I will notify my provider within seventy-two (72) hours. Bill Canales (1954)             Page 2 of 4    Initials:_______  [facility] I will arrange for refills at the prescribed interval ONLY during regular office hours. I will not ask for refills earlier than agreed, after-hours, on holidays or weekends. Refills may take up to 72 hours for processing and prescriptions to reach the pharmacy.  I will inform my other health care providers that I am taking these medications and of the existence of this [id number]. In the event of an emergency, I will provide the same information to the emergency department prescribers.  I will keep my provider updated on the pharmacy I am using for controlled medication prescription filling. 4. MY RESPONSIBILITY FOR PROTECTING MEDICATIONS   I will protect my prescriptions and medications. I understand that lost or misplaced prescriptions will not be replaced.  I will keep medications only for my own use and will not share them with others. I will keep all medications away from children.  I agree that if my medications are adjusted or discontinued, I will properly dispose of any remaining medications. I understand that I will be required to dispose of any remaining controlled medications as, directed by my prescriber, prior to being provided with any prescriptions for other controlled medications.   Medication drop box locations can be found at: [employer]  5. MY RESPONSIBILITY WITH ILLEGAL DRUGS    I will not use illegal or street drugs or another person's prescription medications not prescribed to me.  If there are identified addiction type symptoms, then referral to a program may be provided by my provider and I agree to follow through with this recommendation. 6. MY RESPONSIBILITY FOR COOPERATION WITH INVESTIGATIONS   I understand that my provider will comply with any applicable law and may discuss my use and/or possible misuse/abuse of controlled substances and alcohol, as appropriate, with any health care provider involved in my care, pharmacist, or legal authority.  I authorize my provider and pharmacy to cooperate fully with law enforcement agencies (as permitted by law) in the investigation of any possible misuse, sale, or other diversion of my controlled substances.  I agree to waive any applicable privilege or right of privacy or confidentiality with respect to these authorizations. 7. PROVIDERS RIGHT TO MONITOR FOR SAFETY: PRESCRIPTION MONITORING / DRUG TESTING   I consent to drug/toxicology screening and will submit to a drug screen upon my providers request to assure I am only taking the prescribed drugs for my safety monitoring. I understand that a drug screen is a laboratory test in which a sample of my urine, blood or saliva is checked to see what drugs I have been taking. This may entail an observed urine specimen, which means that a nurse or other health care provider may watch me provide urine, and I will cooperate if I am asked to provide an observed specimen. Kyle Rome (1954)             Page 3 of 4    Initials:_______  [facility] I understand that my provider will check a copy of my State Prescription Monitoring Program () Report in order to safely prescribe medications.      Pill Counts: I consent to pill counts when requested. I may be asked to bring all my prescribed controlled substance medications, in their original bottles, to all of my scheduled appointments. In addition, my provider may ask me to come to the practice at any time for a random pill count. 8. TERMINATION OF THIS AGREEMENT   For my safety, my prescriber has the right to stop prescribing controlled substance medications and may end this agreement.  Conditions that may result in termination of this agreement:  a. I do not show any improvement in pain, or my activity has not improved. b. I develop rapid tolerance or loss of improvement, as described in my treatment plan.  c. I develop significant side effects from the medication. d. My behavior is not consistent with the responsibilities outlined above, thereby causing safety concerns to continue prescribing controlled substance medications. e. I fail to follow the terms of this agreement. f. Other:____________________________     UNDERSTANDING THIS MEDICATION AGREEMENT:    I have read the above and have had all my questions answered. For chronic disease management, I know that my symptoms can be managed with many types of treatments. A chronic medication trial may be part of my treatment, but I must be an active participant in my care. Medication therapy is only one part of my symptom management plan. In some cases, there may be limited scientific evidence to support the chronic use of certain medications to improve symptoms and daily function. Furthermore, in certain circumstances, there may be scientific information that suggests that the use of chronic controlled substances may worsen my symptoms and increase my risk of unintentional death directly related to this medication therapy. I know that if my provider feels my risk from controlled medications is greater than my benefit, I will have my controlled substance medication(s) compassionately lowered or removed altogether. I further agree to allow this office to contact my HIPAA contact if there are concerns about my safety and use of the controlled medications. I have agreed to use the prescribed controlled substance medications to me as instructed by my provider and as stated in this Medication Agreement. My initial on each page and my signature below shows that I have read each page and I have had the opportunity to ask questions with answers provided by my provider.       Patient Name (Printed): _____________________________________    Patient Signature:  ______________________   Date: _____________      Prescriber Name (Printed): ___________________________________    Prescriber Signature: _____________________  Date: _____________     Karla Moseley (1954)             Page 4 of 4

## 2021-08-31 ENCOUNTER — HOSPITAL ENCOUNTER (OUTPATIENT)
Dept: GENERAL RADIOLOGY | Age: 56
Discharge: HOME | End: 2021-08-31
Payer: COMMERCIAL

## 2021-08-31 DIAGNOSIS — M51.26 HNP (HERNIATED NUCLEUS PULPOSUS), LUMBAR: ICD-10-CM

## 2021-08-31 DIAGNOSIS — E66.01 MORBID OBESITY (HCC): ICD-10-CM

## 2021-08-31 DIAGNOSIS — M43.17 SPONDYLOLISTHESIS AT L5-S1 LEVEL: ICD-10-CM

## 2021-08-31 PROCEDURE — 72100 X-RAY EXAM L-S SPINE 2/3 VWS: CPT

## 2021-11-15 ENCOUNTER — APPOINTMENT (OUTPATIENT)
Dept: CT IMAGING | Age: 56
End: 2021-11-15
Attending: EMERGENCY MEDICINE
Payer: COMMERCIAL

## 2021-11-15 ENCOUNTER — HOSPITAL ENCOUNTER (OUTPATIENT)
Age: 56
Setting detail: OBSERVATION
Discharge: HOME | End: 2021-11-17
Attending: EMERGENCY MEDICINE | Admitting: EMERGENCY MEDICINE
Payer: COMMERCIAL

## 2021-11-15 DIAGNOSIS — G45.9 TIA (TRANSIENT ISCHEMIC ATTACK): ICD-10-CM

## 2021-11-15 DIAGNOSIS — H53.9 VISUAL DISTURBANCE: Primary | ICD-10-CM

## 2021-11-15 PROBLEM — I25.812 CORONARY ARTERY DISEASE INVOLVING BYPASS GRAFT OF TRANSPLANTED HEART WITHOUT ANGINA PECTORIS: Status: ACTIVE | Noted: 2021-11-15

## 2021-11-15 PROBLEM — E78.49 OTHER HYPERLIPIDEMIA: Status: ACTIVE | Noted: 2021-11-15

## 2021-11-15 LAB
AMPHET UR QL SCN: NEGATIVE
ANION GAP SERPL CALC-SCNC: 9 MMOL/L (ref 7–16)
BARBITURATES UR QL SCN: NEGATIVE
BASOPHILS # BLD: 0.1 K/UL (ref 0–0.2)
BASOPHILS NFR BLD: 1 % (ref 0–2)
BENZODIAZ UR QL: NEGATIVE
BUN SERPL-MCNC: 17 MG/DL (ref 6–23)
CALCIUM SERPL-MCNC: 9.3 MG/DL (ref 8.3–10.4)
CANNABINOIDS UR QL SCN: NEGATIVE
CHLORIDE SERPL-SCNC: 107 MMOL/L (ref 98–107)
CK MB CFR SERPL CALC: 2.5 %
CK MB SERPL-MCNC: 3.8 NG/ML (ref 1.5–3.6)
CK SERPL-CCNC: 151 U/L (ref 21–215)
CO2 SERPL-SCNC: 24 MMOL/L (ref 21–32)
COCAINE UR QL SCN: NEGATIVE
COVID-19 RAPID TEST, COVR: NOT DETECTED
CREAT SERPL-MCNC: 1.2 MG/DL (ref 0.8–1.5)
DIFFERENTIAL METHOD BLD: ABNORMAL
EOSINOPHIL # BLD: 0.2 K/UL (ref 0–0.8)
EOSINOPHIL NFR BLD: 3 % (ref 0.5–7.8)
ERYTHROCYTE [DISTWIDTH] IN BLOOD BY AUTOMATED COUNT: 14.1 % (ref 11.9–14.6)
GLUCOSE BLD STRIP.AUTO-MCNC: 100 MG/DL (ref 65–100)
GLUCOSE SERPL-MCNC: 105 MG/DL (ref 65–100)
HCT VFR BLD AUTO: 49.7 % (ref 41.1–50.3)
HGB BLD-MCNC: 15.9 G/DL (ref 13.6–17.2)
IMM GRANULOCYTES # BLD AUTO: 0 K/UL (ref 0–0.5)
IMM GRANULOCYTES NFR BLD AUTO: 0 % (ref 0–5)
INR BLD: 1.2 (ref 0.9–1.2)
LYMPHOCYTES # BLD: 2.6 K/UL (ref 0.5–4.6)
LYMPHOCYTES NFR BLD: 34 % (ref 13–44)
MCH RBC QN AUTO: 26.4 PG (ref 26.1–32.9)
MCHC RBC AUTO-ENTMCNC: 32 G/DL (ref 31.4–35)
MCV RBC AUTO: 82.6 FL (ref 79.6–97.8)
METHADONE UR QL: NEGATIVE
MONOCYTES # BLD: 0.7 K/UL (ref 0.1–1.3)
MONOCYTES NFR BLD: 9 % (ref 4–12)
NEUTS SEG # BLD: 3.9 K/UL (ref 1.7–8.2)
NEUTS SEG NFR BLD: 53 % (ref 43–78)
NRBC # BLD: 0 K/UL (ref 0–0.2)
OPIATES UR QL: NEGATIVE
PCP UR QL: NEGATIVE
PLATELET # BLD AUTO: 259 K/UL (ref 150–450)
PMV BLD AUTO: 11.1 FL (ref 9.4–12.3)
POTASSIUM SERPL-SCNC: 3.4 MMOL/L (ref 3.5–5.1)
PT BLD: 13.7 SECS (ref 9.6–11.6)
RBC # BLD AUTO: 6.02 M/UL (ref 4.23–5.6)
SERVICE CMNT-IMP: NORMAL
SODIUM SERPL-SCNC: 140 MMOL/L (ref 136–145)
SOURCE, COVRS: NORMAL
TROPONIN-HIGH SENSITIVITY: 9.1 PG/ML (ref 0–14)
WBC # BLD AUTO: 7.5 K/UL (ref 4.3–11.1)

## 2021-11-15 PROCEDURE — 87635 SARS-COV-2 COVID-19 AMP PRB: CPT

## 2021-11-15 PROCEDURE — G0378 HOSPITAL OBSERVATION PER HR: HCPCS

## 2021-11-15 PROCEDURE — 80048 BASIC METABOLIC PNL TOTAL CA: CPT

## 2021-11-15 PROCEDURE — 84484 ASSAY OF TROPONIN QUANT: CPT

## 2021-11-15 PROCEDURE — 74011250637 HC RX REV CODE- 250/637: Performed by: EMERGENCY MEDICINE

## 2021-11-15 PROCEDURE — 85025 COMPLETE CBC W/AUTO DIFF WBC: CPT

## 2021-11-15 PROCEDURE — 93005 ELECTROCARDIOGRAM TRACING: CPT | Performed by: EMERGENCY MEDICINE

## 2021-11-15 PROCEDURE — 80307 DRUG TEST PRSMV CHEM ANLYZR: CPT

## 2021-11-15 PROCEDURE — 82550 ASSAY OF CK (CPK): CPT

## 2021-11-15 PROCEDURE — 74011000258 HC RX REV CODE- 258: Performed by: EMERGENCY MEDICINE

## 2021-11-15 PROCEDURE — 0042T CT PERF W CBF: CPT

## 2021-11-15 PROCEDURE — 74011000636 HC RX REV CODE- 636: Performed by: EMERGENCY MEDICINE

## 2021-11-15 PROCEDURE — 99285 EMERGENCY DEPT VISIT HI MDM: CPT

## 2021-11-15 PROCEDURE — 82962 GLUCOSE BLOOD TEST: CPT

## 2021-11-15 PROCEDURE — 70496 CT ANGIOGRAPHY HEAD: CPT

## 2021-11-15 PROCEDURE — 70450 CT HEAD/BRAIN W/O DYE: CPT

## 2021-11-15 PROCEDURE — 85610 PROTHROMBIN TIME: CPT

## 2021-11-15 RX ORDER — SODIUM CHLORIDE 0.9 % (FLUSH) 0.9 %
10 SYRINGE (ML) INJECTION
Status: COMPLETED | OUTPATIENT
Start: 2021-11-15 | End: 2021-11-15

## 2021-11-15 RX ORDER — ASPIRIN 325 MG
325 TABLET ORAL ONCE
Status: COMPLETED | OUTPATIENT
Start: 2021-11-15 | End: 2021-11-15

## 2021-11-15 RX ADMIN — SODIUM CHLORIDE 100 ML: 900 INJECTION, SOLUTION INTRAVENOUS at 23:03

## 2021-11-15 RX ADMIN — Medication 10 ML: at 23:03

## 2021-11-15 RX ADMIN — IOPAMIDOL 100 ML: 755 INJECTION, SOLUTION INTRAVENOUS at 23:03

## 2021-11-15 RX ADMIN — ASPIRIN 325 MG: 325 TABLET, FILM COATED ORAL at 21:31

## 2021-11-16 ENCOUNTER — APPOINTMENT (OUTPATIENT)
Dept: ULTRASOUND IMAGING | Age: 56
End: 2021-11-16
Attending: EMERGENCY MEDICINE
Payer: COMMERCIAL

## 2021-11-16 LAB
ANION GAP SERPL CALC-SCNC: 6 MMOL/L (ref 7–16)
ATRIAL RATE: 82 BPM
BUN SERPL-MCNC: 15 MG/DL (ref 6–23)
CALCIUM SERPL-MCNC: 8.2 MG/DL (ref 8.3–10.4)
CALCULATED P AXIS, ECG09: 147 DEGREES
CALCULATED R AXIS, ECG10: 54 DEGREES
CALCULATED T AXIS, ECG11: 83 DEGREES
CHLORIDE SERPL-SCNC: 107 MMOL/L (ref 98–107)
CHOLEST SERPL-MCNC: 146 MG/DL
CO2 SERPL-SCNC: 26 MMOL/L (ref 21–32)
CREAT SERPL-MCNC: 0.82 MG/DL (ref 0.8–1.5)
DIAGNOSIS, 93000: NORMAL
ERYTHROCYTE [DISTWIDTH] IN BLOOD BY AUTOMATED COUNT: 14 % (ref 11.9–14.6)
EST. AVERAGE GLUCOSE BLD GHB EST-MCNC: 217 MG/DL
GLUCOSE BLD STRIP.AUTO-MCNC: 137 MG/DL (ref 65–100)
GLUCOSE BLD STRIP.AUTO-MCNC: 143 MG/DL (ref 65–100)
GLUCOSE BLD STRIP.AUTO-MCNC: 178 MG/DL (ref 65–100)
GLUCOSE BLD STRIP.AUTO-MCNC: 197 MG/DL (ref 65–100)
GLUCOSE SERPL-MCNC: 113 MG/DL (ref 65–100)
HBA1C MFR BLD: 9.2 % (ref 4.2–6.3)
HCT VFR BLD AUTO: 46.6 % (ref 41.1–50.3)
HDLC SERPL-MCNC: 36 MG/DL (ref 40–60)
HDLC SERPL: 4.1 {RATIO}
HGB BLD-MCNC: 14.8 G/DL (ref 13.6–17.2)
LDLC SERPL CALC-MCNC: 72.6 MG/DL
MCH RBC QN AUTO: 26.1 PG (ref 26.1–32.9)
MCHC RBC AUTO-ENTMCNC: 31.8 G/DL (ref 31.4–35)
MCV RBC AUTO: 82.3 FL (ref 79.6–97.8)
NRBC # BLD: 0 K/UL (ref 0–0.2)
P-R INTERVAL, ECG05: 190 MS
PLATELET # BLD AUTO: 235 K/UL (ref 150–450)
PMV BLD AUTO: 11.1 FL (ref 9.4–12.3)
POTASSIUM SERPL-SCNC: 3.7 MMOL/L (ref 3.5–5.1)
Q-T INTERVAL, ECG07: 396 MS
QRS DURATION, ECG06: 80 MS
QTC CALCULATION (BEZET), ECG08: 439 MS
RBC # BLD AUTO: 5.66 M/UL (ref 4.23–5.6)
SERVICE CMNT-IMP: ABNORMAL
SODIUM SERPL-SCNC: 139 MMOL/L (ref 136–145)
TRIGL SERPL-MCNC: 187 MG/DL (ref 35–150)
VENTRICULAR RATE, ECG03: 74 BPM
VLDLC SERPL CALC-MCNC: 37.4 MG/DL (ref 6–23)
WBC # BLD AUTO: 6.4 K/UL (ref 4.3–11.1)

## 2021-11-16 PROCEDURE — G0378 HOSPITAL OBSERVATION PER HR: HCPCS

## 2021-11-16 PROCEDURE — 99358 PROLONG SERVICE W/O CONTACT: CPT | Performed by: PHYSICAL MEDICINE & REHABILITATION

## 2021-11-16 PROCEDURE — 74011636637 HC RX REV CODE- 636/637: Performed by: EMERGENCY MEDICINE

## 2021-11-16 PROCEDURE — 82962 GLUCOSE BLOOD TEST: CPT

## 2021-11-16 PROCEDURE — 85027 COMPLETE CBC AUTOMATED: CPT

## 2021-11-16 PROCEDURE — 97165 OT EVAL LOW COMPLEX 30 MIN: CPT

## 2021-11-16 PROCEDURE — 92610 EVALUATE SWALLOWING FUNCTION: CPT

## 2021-11-16 PROCEDURE — 99204 OFFICE O/P NEW MOD 45 MIN: CPT | Performed by: PSYCHIATRY & NEUROLOGY

## 2021-11-16 PROCEDURE — 83036 HEMOGLOBIN GLYCOSYLATED A1C: CPT

## 2021-11-16 PROCEDURE — 97535 SELF CARE MNGMENT TRAINING: CPT

## 2021-11-16 PROCEDURE — 36415 COLL VENOUS BLD VENIPUNCTURE: CPT

## 2021-11-16 PROCEDURE — 93880 EXTRACRANIAL BILAT STUDY: CPT

## 2021-11-16 PROCEDURE — 74011250637 HC RX REV CODE- 250/637: Performed by: EMERGENCY MEDICINE

## 2021-11-16 PROCEDURE — 97161 PT EVAL LOW COMPLEX 20 MIN: CPT

## 2021-11-16 PROCEDURE — 80061 LIPID PANEL: CPT

## 2021-11-16 PROCEDURE — 74011250637 HC RX REV CODE- 250/637: Performed by: PSYCHIATRY & NEUROLOGY

## 2021-11-16 PROCEDURE — 97530 THERAPEUTIC ACTIVITIES: CPT

## 2021-11-16 PROCEDURE — 74011250636 HC RX REV CODE- 250/636: Performed by: EMERGENCY MEDICINE

## 2021-11-16 PROCEDURE — 74011250637 HC RX REV CODE- 250/637: Performed by: STUDENT IN AN ORGANIZED HEALTH CARE EDUCATION/TRAINING PROGRAM

## 2021-11-16 PROCEDURE — 80048 BASIC METABOLIC PNL TOTAL CA: CPT

## 2021-11-16 PROCEDURE — 96372 THER/PROPH/DIAG INJ SC/IM: CPT

## 2021-11-16 RX ORDER — SODIUM CHLORIDE 0.9 % (FLUSH) 0.9 %
5-40 SYRINGE (ML) INJECTION AS NEEDED
Status: DISCONTINUED | OUTPATIENT
Start: 2021-11-16 | End: 2021-11-17 | Stop reason: HOSPADM

## 2021-11-16 RX ORDER — ASPIRIN 81 MG/1
81 TABLET ORAL DAILY
Status: DISCONTINUED | OUTPATIENT
Start: 2021-11-16 | End: 2021-11-17 | Stop reason: HOSPADM

## 2021-11-16 RX ORDER — GLIPIZIDE 5 MG/1
10 TABLET ORAL
Status: DISCONTINUED | OUTPATIENT
Start: 2021-11-16 | End: 2021-11-17 | Stop reason: HOSPADM

## 2021-11-16 RX ORDER — INSULIN LISPRO 100 [IU]/ML
INJECTION, SOLUTION INTRAVENOUS; SUBCUTANEOUS
Status: DISCONTINUED | OUTPATIENT
Start: 2021-11-16 | End: 2021-11-17 | Stop reason: HOSPADM

## 2021-11-16 RX ORDER — ONDANSETRON 2 MG/ML
4 INJECTION INTRAMUSCULAR; INTRAVENOUS
Status: DISCONTINUED | OUTPATIENT
Start: 2021-11-16 | End: 2021-11-17 | Stop reason: HOSPADM

## 2021-11-16 RX ORDER — ALOGLIPTIN 6.25 MG/1
25 TABLET, FILM COATED ORAL DAILY
Status: DISCONTINUED | OUTPATIENT
Start: 2021-11-16 | End: 2021-11-17 | Stop reason: HOSPADM

## 2021-11-16 RX ORDER — FENOFIBRATE 160 MG/1
160 TABLET ORAL DAILY
Status: DISCONTINUED | OUTPATIENT
Start: 2021-11-16 | End: 2021-11-17 | Stop reason: HOSPADM

## 2021-11-16 RX ORDER — LEVOTHYROXINE SODIUM 50 UG/1
25 TABLET ORAL DAILY
Status: DISCONTINUED | OUTPATIENT
Start: 2021-11-16 | End: 2021-11-16 | Stop reason: SDUPTHER

## 2021-11-16 RX ORDER — ENOXAPARIN SODIUM 100 MG/ML
40 INJECTION SUBCUTANEOUS EVERY 12 HOURS
Status: DISCONTINUED | OUTPATIENT
Start: 2021-11-16 | End: 2021-11-17 | Stop reason: HOSPADM

## 2021-11-16 RX ORDER — SODIUM CHLORIDE 0.9 % (FLUSH) 0.9 %
5-40 SYRINGE (ML) INJECTION EVERY 8 HOURS
Status: DISCONTINUED | OUTPATIENT
Start: 2021-11-16 | End: 2021-11-17 | Stop reason: HOSPADM

## 2021-11-16 RX ORDER — METOPROLOL TARTRATE 25 MG/1
12.5 TABLET, FILM COATED ORAL 2 TIMES DAILY
Status: DISCONTINUED | OUTPATIENT
Start: 2021-11-16 | End: 2021-11-17

## 2021-11-16 RX ORDER — ATORVASTATIN CALCIUM 40 MG/1
80 TABLET, FILM COATED ORAL
Status: DISCONTINUED | OUTPATIENT
Start: 2021-11-16 | End: 2021-11-17 | Stop reason: HOSPADM

## 2021-11-16 RX ORDER — ACETAMINOPHEN 325 MG/1
650 TABLET ORAL
Status: DISCONTINUED | OUTPATIENT
Start: 2021-11-16 | End: 2021-11-17 | Stop reason: HOSPADM

## 2021-11-16 RX ORDER — CLOPIDOGREL BISULFATE 75 MG/1
300 TABLET ORAL ONCE
Status: COMPLETED | OUTPATIENT
Start: 2021-11-16 | End: 2021-11-16

## 2021-11-16 RX ORDER — PANTOPRAZOLE SODIUM 40 MG/1
40 TABLET, DELAYED RELEASE ORAL
Status: DISCONTINUED | OUTPATIENT
Start: 2021-11-16 | End: 2021-11-17 | Stop reason: HOSPADM

## 2021-11-16 RX ORDER — CYCLOBENZAPRINE HCL 10 MG
10 TABLET ORAL
Status: DISCONTINUED | OUTPATIENT
Start: 2021-11-16 | End: 2021-11-17 | Stop reason: HOSPADM

## 2021-11-16 RX ORDER — CLOPIDOGREL BISULFATE 75 MG/1
75 TABLET ORAL DAILY
Status: DISCONTINUED | OUTPATIENT
Start: 2021-11-17 | End: 2021-11-17 | Stop reason: HOSPADM

## 2021-11-16 RX ORDER — CETIRIZINE HCL 10 MG
10 TABLET ORAL
Status: DISCONTINUED | OUTPATIENT
Start: 2021-11-16 | End: 2021-11-17 | Stop reason: HOSPADM

## 2021-11-16 RX ADMIN — PANTOPRAZOLE SODIUM 40 MG: 40 TABLET, DELAYED RELEASE ORAL at 17:43

## 2021-11-16 RX ADMIN — INSULIN LISPRO 3 UNITS: 100 INJECTION, SOLUTION INTRAVENOUS; SUBCUTANEOUS at 17:25

## 2021-11-16 RX ADMIN — METOPROLOL TARTRATE 12.5 MG: 25 TABLET, FILM COATED ORAL at 17:43

## 2021-11-16 RX ADMIN — GLIPIZIDE 10 MG: 5 TABLET ORAL at 09:39

## 2021-11-16 RX ADMIN — CLOPIDOGREL BISULFATE 300 MG: 75 TABLET ORAL at 12:55

## 2021-11-16 RX ADMIN — GLIPIZIDE 10 MG: 5 TABLET ORAL at 17:43

## 2021-11-16 RX ADMIN — ENOXAPARIN SODIUM 40 MG: 100 INJECTION SUBCUTANEOUS at 20:05

## 2021-11-16 RX ADMIN — Medication 10 ML: at 14:16

## 2021-11-16 RX ADMIN — LEVOTHYROXINE SODIUM 225 MCG: 0.12 TABLET ORAL at 09:38

## 2021-11-16 RX ADMIN — FENOFIBRATE 160 MG: 160 TABLET ORAL at 09:39

## 2021-11-16 RX ADMIN — INSULIN LISPRO 3 UNITS: 100 INJECTION, SOLUTION INTRAVENOUS; SUBCUTANEOUS at 22:00

## 2021-11-16 RX ADMIN — Medication 10 ML: at 06:00

## 2021-11-16 RX ADMIN — ENOXAPARIN SODIUM 40 MG: 100 INJECTION SUBCUTANEOUS at 09:40

## 2021-11-16 RX ADMIN — ALOGLIPTIN 25 MG: 6.25 TABLET, FILM COATED ORAL at 09:39

## 2021-11-16 RX ADMIN — ATORVASTATIN CALCIUM 80 MG: 40 TABLET, FILM COATED ORAL at 01:28

## 2021-11-16 RX ADMIN — PANTOPRAZOLE SODIUM 40 MG: 40 TABLET, DELAYED RELEASE ORAL at 09:39

## 2021-11-16 RX ADMIN — ATORVASTATIN CALCIUM 80 MG: 40 TABLET, FILM COATED ORAL at 20:06

## 2021-11-16 RX ADMIN — Medication 5 ML: at 22:00

## 2021-11-16 RX ADMIN — Medication 10 ML: at 01:28

## 2021-11-16 RX ADMIN — METOPROLOL TARTRATE 12.5 MG: 25 TABLET, FILM COATED ORAL at 09:38

## 2021-11-16 RX ADMIN — ACETAMINOPHEN 650 MG: 325 TABLET ORAL at 20:05

## 2021-11-16 RX ADMIN — ACETAMINOPHEN 650 MG: 325 TABLET ORAL at 12:55

## 2021-11-16 RX ADMIN — ASPIRIN 81 MG: 81 TABLET, COATED ORAL at 09:39

## 2021-11-16 NOTE — PROGRESS NOTES
Care Management Interventions  PCP Verified by CM: Yes (Dr. Connie Austin)  Mode of Transport at Discharge:  (family)  Transition of Care Consult (CM Consult): Discharge Planning  Support Systems: Parent(s)  Confirm Follow Up Transport: Family  The Patient and/or Patient Representative was Provided with a Choice of Provider and Agrees with the Discharge Plan?: Yes  Freedom of Choice List was Provided with Basic Dialogue that Supports the Patient's Individualized Plan of Care/Goals, Treatment Preferences and Shares the Quality Data Associated with the Providers?: Yes  Discharge Location  Discharge Placement: Home  Visited with pt to establish prior to admission baseline and discuss their anticipated goals at time of d/c. Pt lives at home with spouse, inde and works FT. He sees Dr. Mary Feldman and Shane Veliz are filled at [address] [place] [address] in [place]. Pt still waiting on MRI, also therapy has no further recommendations. Dr. Nick Stroud reviewed chart & no post acute therapy is needed. CM to follow.

## 2021-11-16 NOTE — ED TRIAGE NOTES
Pt c/o headache X3 days and double vision since lunchtime today. Pt also concerned about [place] blood pressure. Pt masked.

## 2021-11-16 NOTE — PROGRESS NOTES
OBSERVATION STATUS  SPEECH LANGUAGE PATHOLOGY: DYSPHAGIA- Initial Assessment    NAME/AGE/GENDER: Valentina Hallman is a 49 y.o. male  DATE: 11/16/2021  PRIMARY DIAGNOSIS: TIA (transient ischemic attack) [G45.9]      ICD-10: Treatment Diagnosis: R13.12 Dysphagia, Oropharyngeal Phase    RECOMMENDATIONS   DIET:    Regular Consistency   Thin Liquids    MEDICATIONS: With liquid     PRECAUTIONS, MODIFICATIONS, AND STRATEGIES  · Slow rate of intake  · Small bites/sips  · Upright at 90 degrees during meal   RECOMMENDATIONS FOR CONTINUED SPEECH THERAPY:   YES: Anticipate need for ongoing speech therapy during this hospitalization and at next level of care. ASSESSMENT   Patient presents with oropharyngeal swallow function that is within normal limits. No s/sx of dysphagia identified. Recommend continue regular consistency diet/thin liquids. Meds with liquid rinse. No cognitive linguistic deficits noted; however, will plan for further assessment if clinically indicated by imaging results. EDUCATION:  · Recommendations discussed with Patient and Family    REHABILITATION POTENTIAL FOR STATED GOALS: Excellent    PLAN    FREQUENCY/DURATION:   Possible cognitive-linguistic assessment pending clinical coarse and imaging results    CONTINUATION OF SKILLED SERVICES/MEDICAL NECESSITY:   Patient continues to require skilled intervention due to need for cognitive linguistic assessment if indicated by imaging results. SUBJECTIVE   Patient alert upright in chair for session. Pleasant and friendly. Denies any difficulty swallowing or changes in speech, language, or cognitive function. Wife at bedside.      Oxygen Device: room air  Pain: Pain Scale 1: Numeric (0 - 10)  Pain Intensity 1: 0    History of Present Injury/Illness: Mr. Estephanie Benton  has a past medical history of Allergic rhinitis, Coronary arteriosclerosis (02/14/2020), COVID-19 (03/25/2021), Diabetes ([facility] [address] Erectile dysfunction, GERD (gastroesophageal reflux disease), Hypercholesterolemia, Hypertension, Long term current use of insulin ([address] Lung nodule, Morbid obesity ([address] Obstructive sleep apnea syndrome, and Thyroid cancer ([address] . He also  has a past surgical history that includes hx thyroidectomy (2011); hx uvulopalatopharyngoplasty (01/01/2001); pr cardiac surg procedure unlist (02/12/2020); hx heart catheterization (2020); hx cholecystectomy; hx tonsillectomy; hx orthopaedic (Right); and hx lumbar fusion (05/12/2021). PRECAUTIONS/ALLERGIES: Patient has no known allergies. Problem List:  (Impairments causing functional limitations):  1. Oropharyngeal dysphagia- No symptoms identified      Previous Dysphagia: NONE REPORTED  Diet Prior to Evaluation: Regular/thin    Orientation:  Person  Place  Time  Situation    Cognitive-Linguistic Screening:   Alertness  o Alert   Speech Production:   o Fully intelligible   Expressive Language:  o Fluent speech and Able to effectively communicate wants and needs   Receptive Language:  o Answers yes/no questions appropriately and Follows commands   Cognition:   o No deficits noted. Answering questions promptly and appropriately. Attending to tasks. Prior Level of Function: Lives at home with wife. Works full time as a /manager. Recommendations: Given results of screening, patient appears to be functioning at baseline. However imaging results are still pending. Will follow up for further assessment as indicated by findings. OBJECTIVE   Oral Motor:   · Labial: No impairment  · Dentition: Intact  · Oral Hygiene: Adequate  · Lingual: No impairment    Dysphagia evaluation:   Patient consumed ~5 oz thin by cup/straw/serial sips and regular consistency lunch meal options (meat loaf and carrots). Appropriate oral prep with all textures. Timely swallow initiation, and single swallows upon palpation. Adequate oral clearing.  No overt signs or symptoms of airway compromise observed with liquid or solid textures. Tool Used: Dysphagia Outcome and Severity Scale (IONA)    Score Comments   Normal Diet  [x] 7 With no strategies or extra time needed   Functional Swallow  [] 6 May have mild oral or pharyngeal delay   Mild Dysphagia  [] 5 Which may require one diet consistency restricted    Mild-Moderate Dysphagia  [] 4 With 1-2 diet consistencies restricted   Moderate Dysphagia  [] 3 With 2 or more diet consistencies restricted   Moderate-Severe Dysphagia  [] 2 With partial PO strategies (trials with ST only)   Severe Dysphagia  [] 1 With inability to tolerate any PO safely      Score:  Initial: 7 Most Recent:  x (Date 11/16/21 )   Interpretation of Tool: The Dysphagia Outcome and Severity Scale (IONA) is a simple, easy-to-use, 7-point scale developed to systematically rate the functional severity of dysphagia based on objective assessment and make recommendations for diet level, independence level, and type of nutrition. Current Medications:   No current facility-administered medications on file prior to encounter. Current Outpatient Medications on File Prior to Encounter   Medication Sig Dispense Refill    cyclobenzaprine (FLEXERIL) 10 mg tablet Take 1 Tab by mouth three (3) times daily as needed for Muscle Spasm(s). Indications: muscle spasm 30 Tab 1    cetirizine (ZYRTEC) 10 mg tablet Take 10 mg by mouth as needed.  acetaminophen (TYLENOL) 500 mg tablet Take 500 mg by mouth as needed.  atorvastatin (LIPITOR) 80 mg tablet TAKE 1 TABLET (80 MG) BY MOUTH NIGHTLY      esomeprazole (NEXIUM) 40 mg capsule 40 mg two (2) times a day.  fenofibrate (LOFIBRA) 160 mg tablet TAKE 1 TABLET BY MOUTH EVERY DAY      glipiZIDE (GLUCOTROL) 10 mg tablet TAKE 1 TABLET BY MOUTH TWICE A DAY      levothyroxine (SYNTHROID) 200 mcg tablet 200 mcg Daily (before breakfast). Pt takes 225 mcg daily (200 mcg tab and 25 mcg tab)      levothyroxine (SYNTHROID) 25 mcg tablet 25 mcg Daily (before breakfast). Pt takes 225 mcg daily (200 mcg tab and 25 mcg tab)      metoprolol tartrate (LOPRESSOR) 25 mg tablet TAKE 0.5 TABLETS (12.5 MG) BY MOUTH 2 (TWO) TIMES A DAY      Januvia 100 mg tablet every evening.  aspirin delayed-release 81 mg tablet Take  by mouth.  dapagliflozin (Farxiga) 10 mg tab tablet Take 10 mg by mouth daily.  insulin aspart protamine/insulin aspart (NovoLOG Mix 70-30 U-100 Insuln) 100 unit/mL (70-30) injection 30-40 Units by SubCUTAneous route Before breakfast, lunch, and dinner. Sliding scale      [DISCONTINUED] ibuprofen (MOTRIN) 200 mg tablet Take 400 mg by mouth as needed for Pain.           INTERDISCIPLINARY COLLABORATION: RN    After treatment position/precautions:  · Upright in chair  · wife at bedside    Total Treatment Duration:   Time In: 3742  [facility]: [facility] [address], CCC-SLP

## 2021-11-16 NOTE — ASSESSMENT & PLAN NOTE
Metoprolol as prescribed. Admits of hypertension for the next 24 hours. Will monitor closely and adjust treatment as necessary.

## 2021-11-16 NOTE — H&P
History and Physical          Hospitalist History and Physical   Admit Date:  11/15/2021  7:34 PM   Name:  Eliza Mahmood   Age:  49 y.o. Sex:  male  :  1965   MRN:  [id number]   Room:  [place] [address]    Presenting Complaint: Headache    Reason(s) for Admission: TIA (transient ischemic attack) [G45.9]     History of Present Illness:   Eliza Mahmood is a 49 y.o. male with medical history of hypertension, hyperlipidemia, type 2 diabetes on long-term insulin, coronary artery disease status post 5 vessel CABG in , who presented with complaint of blurry vision starting at 12 noon today without any other associated symptoms including loss of visual fields, focal weakness, focal numbness. He also denies fever, chills, chest pain, shortness of breath, diaphoresis, nausea, vomiting, abdominal pain, diarrhea, constipation. Review of Systems:  10 systems reviewed and negative except as noted in HPI. Assessment & Plan:     Principal Problem:    TIA (transient ischemic attack)  About a mild TIA with blurry vision but no other neurologic changes. On arrival was outside the window for TPA. NIH score 0. Was seen by teleneurology who recommends MRI in the morning, CTA/CTP, aspirin, statin, echo. We will also get carotids. Will monitor closely and adjust treatment as necessary. Hypertension  Metoprolol as prescribed. Admits of hypertension for the next 24 hours. Will monitor closely and adjust treatment as necessary. Type 2 diabetes mellitus, with long-term current use of insulin (HCC)  Normally takes 30 to 40 units of 7030 on a sliding scale type approach 3 meals a day. Sugar appears controlled at this point. Will place on sliding scale, monitor closely, and adjust treatment as necessary. 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 Continue atorvastatin daily. Will monitor and adjust as necessary.     Other hyperlipidemia  Continue statin. Recheck lipid panel. Will monitor and adjust treatment as necessary. Mor[facility] ([facility]  PT and OT to see and evaluate. I appreciate their involvement in the ongoing care of this patient. Would definitely benefit from increased exercise and improved diet.           Dispo/Discharge Planning:   OBSERVATION    Diet: Diabetic heart- healthy diet  VTE ppx: Lovenox  Code status: Full Code    Hospital Problems as of 11/15/2021 Date Reviewed: 11/15/2021          Codes Class Noted - Resolved POA    * (Principal) TIA (transient ischemic attack) ICD-10-CM: G45.9  ICD-9-CM: 435.9  11/15/2021 - Present Yes        Hypertension ICD-10-CM: I10  ICD-9-CM: 401.9  Unknown - Present Yes    Overview Signed 10/11/2021 10:41 AM by Maya Day MD     medication             Type 2 diabetes mellitus, with long-term current use of insulin ([facility] ICD-10-CM: E11.9, Z79.4  ICD-9-CM: 250.00, V58.67  Unknown - Present Yes    Overview Signed 10/11/2021 10:42 AM by Maya Day MD     type 2, oral and insulin, FBS 180s, denies s&s of hypoglycemia             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 ICD-10-CM: I25.812  ICD-9-CM: 414.07  11/15/2021 - Present Yes        Other hyperlipidemia ICD-10-CM: E78.49  ICD-9-CM: 272.4  11/15/2021 - Present Yes        Morbid obesity ([facility] ICD-10-CM: E66.01  ICD-9-CM: 278.01  3/23/2021 - Present Yes              Past History:  Past Medical History:   Diagnosis Date    Allergic rhinitis     Coronary arteriosclerosis 02/14/2020    5v CABG    COVID-19 03/25/2021    only symptoms: fever and tired    Diabetes ([facility] [address]     type 2, oral and insulin, FBS 180s, denies s&s of hypoglycemia    Erectile dysfunction     GERD (gastroesophageal reflux disease)     medicatioin    Hypercholesterolemia     Hypertension     medication    Long term current use of insulin (HCC)     Lung nodule     Morbid obesity ([facility] [address]     Obstructive sleep apnea syndrome     CPAP     Thyroid cancer ([facility] [address]     thyroidectomy     Past Surgical History:   Procedure Laterality Date    HX CHOLECYSTECTOMY      HX HEART CATHETERIZATION  2020    pt. had 5 bypass    HX LUMBAR FUSION  05/12/2021    L5-S1 TLIF Dr. Keyon Patterson HX ORTHOPAEDIC Right     bicep tendon repair    HX THYROIDECTOMY  2011    HX TONSILLECTOMY      HX UVULOPALATOPHARYNGOPLASTY  01/01/2001    MA CARDIAC SURG PROCEDURE UNLIST  02/12/2020    Cardiopulmonary bypass       No Known Allergies   Social History     Tobacco Use    Smoking status: Never Smoker    Smokeless tobacco: Former User     Types: Chew   Substance Use Topics    Alcohol use: Never      History reviewed. No pertinent family history. Family history reviewed and negative except as otherwise noted. There is no immunization history on file for this patient. Prior to Admit Medications:  Current Outpatient Medications   Medication Instructions    acetaminophen (TYLENOL) 500 mg, Oral, AS NEEDED    aspirin delayed-release 81 mg tablet Oral    atorvastatin (LIPITOR) 80 mg tablet TAKE 1 TABLET (80 MG) BY MOUTH NIGHTLY    cetirizine (ZYRTEC) 10 mg, Oral, AS NEEDED    cyclobenzaprine (FLEXERIL) 10 mg, Oral, 3 TIMES DAILY AS NEEDED    dapagliflozin (FARXIGA) 10 mg, Oral, DAILY    esomeprazole (NEXIUM) 40 mg, 2 TIMES DAILY    fenofibrate (LOFIBRA) 160 mg tablet TAKE 1 TABLET BY MOUTH EVERY DAY    glipiZIDE (GLUCOTROL) 10 mg tablet TAKE 1 TABLET BY MOUTH TWICE A DAY    ibuprofen (MOTRIN) 400 mg, Oral, AS NEEDED    insulin aspart protamine/insulin aspart (NovoLOG Mix 70-30 U-100 Insuln) 100 unit/mL (70-30) injection 30-40 Units, SubCUTAneous, 3 TIMES DAILY BEFORE MEALS, Sliding scale    Januvia 100 mg tablet EVERY EVENING    levothyroxine (SYNTHROID) 200 mcg tablet No dose, route, or frequency recorded.     levothyroxine (SYNTHROID) 25 mcg tablet TAKE 1 TABLET BY MOUTH EVERY DAY    metoprolol tartrate (LOPRESSOR) 25 mg tablet TAKE 0.5 TABLETS (12.5 MG) BY MOUTH 2 (TWO) TIMES A DAY       Objective:     Patient Vitals for the past 24 hrs:   Temp Pulse Resp BP SpO2   11/15/21 1927 98.2 °F (36.8 °C) 80 18 (!) 181/94 98 %     Oxygen Therapy  O2 Sat (%): 98 % (11/15/21 1927)  O2 Device: None (Room air) (11/15/21 1927)    Estimated body mass index is 42.04 kg/m² as calculated from the following:    Height as of this encounter: 6' (1.829 m). Weight as of this encounter: 140.6 kg (310 lb). No intake or output data in the 24 hours ending 11/15/21 2135      Physical Exam:    Blood pressure (!) 181/94, pulse 80, temperature 98.2 °F (36.8 °C), resp. rate 18, height 6' (1.829 m), weight 140.6 kg (310 lb), SpO2 98 %. General:    Well nourished. Lying semireclined in no acute distress on stretcher. Appears comfortable. Nontoxic-appearing. Not ill-appearing. Head:  Normocephalic, atraumatic  Eyes:  Sclerae appear normal.  Pupils equally round. ENT:  Nares appear normal, no drainage. Moist oral mucosa  Neck:  No restricted ROM. Trachea midline   CV:   RRR. No m/r/g. No jugular venous distension. Lungs:   CTAB. No wheezing, rhonchi, or rales. Respirations even, unlabored  Abdomen: Bowel sounds present. Soft, nontender, nondistended. Extremities: No cyanosis or clubbing. No edema  Skin:     No rashes and normal coloration. Warm and dry. Neuro:  CN II-XII grossly intact. Sensation intact. A&Ox3. Normal and equal bilateral upper and lower extremity strength, sensation. Normal speech. NIH scale score equals 0. Psych:  Normal mood and affect.       I have reviewed ordered lab tests and independently visualized imaging below:    Last 24hr Labs:  Recent Results (from the past 24 hour(s))   GLUCOSE, POC    Collection Time: 11/15/21  7:34 PM   Result Value Ref Range    Glucose (POC) 100 65 - 100 mg/dL    Performed by Faraz Jackson BASIC    Collection Time: 11/15/21  7:37 PM   Result Value Ref Range    Sodium 140 136 - 145 mmol/L    Potassium 3.4 (L) 3.5 - 5.1 mmol/L    Chloride 107 98 - 107 mmol/L    CO2 24 21 - 32 mmol/L    Anion gap 9 7 - 16 mmol/L    Glucose 105 (H) 65 - 100 mg/dL    BUN 17 6 - 23 MG/DL    Creatinine 1.20 0.8 - 1.5 MG/DL    GFR est AA >60 >60 ml/min/1.73m2    GFR est non-AA >60 >60 ml/min/1.73m2    Calcium 9.3 8.3 - 10.4 MG/DL   CBC WITH AUTOMATED DIFF    Collection Time: 11/15/21  7:37 PM   Result Value Ref Range    WBC 7.5 4.3 - 11.1 K/uL    RBC 6.02 (H) 4.23 - 5.6 M/uL    HGB 15.9 13.6 - 17.2 g/dL    HCT 49.7 41.1 - 50.3 %    MCV 82.6 79.6 - 97.8 FL    MCH 26.4 26.1 - 32.9 PG    MCHC 32.0 31.4 - 35.0 g/dL    RDW 14.1 11.9 - 14.6 %    PLATELET 573 215 - 019 K/uL    MPV 11.1 9.4 - 12.3 FL    ABSOLUTE NRBC 0.00 0.0 - 0.2 K/uL    NEUTROPHILS 53 43 - 78 %    LYMPHOCYTES 34 13 - 44 %    MONOCYTES 9 4.0 - 12.0 %    EOSINOPHILS 3 0.5 - 7.8 %    BASOPHILS 1 0.0 - 2.0 %    IMMATURE GRANULOCYTES 0 0.0 - 5.0 %    ABS. NEUTROPHILS 3.9 1.7 - 8.2 K/UL    ABS. LYMPHOCYTES 2.6 0.5 - 4.6 K/UL    ABS. MONOCYTES 0.7 0.1 - 1.3 K/UL    ABS. EOSINOPHILS 0.2 0.0 - 0.8 K/UL    ABS. BASOPHILS 0.1 0.0 - 0.2 K/UL    ABS. IMM.  GRANS. 0.0 0.0 - 0.5 K/UL    DF AUTOMATED     CK WITH MB    Collection Time: 11/15/21  7:37 PM   Result Value Ref Range    CK - MB 3.8 (H) 1.5 - 3.6 ng/ml    CK-MB Index 2.5 %     21 - 215 U/L   TROPONIN-HIGH SENSITIVITY    Collection Time: 11/15/21  7:37 PM   Result Value Ref Range    Troponin-High Sensitivity 9.1 0 - 14 pg/mL   EKG, 12 LEAD, INITIAL    Collection Time: 11/15/21  7:54 PM   Result Value Ref Range    Ventricular Rate 74 BPM    Atrial Rate 82 BPM    P-R Interval 190 ms    QRS Duration 80 ms    Q-T Interval 396 ms    QTC Calculation (Bezet) 439 ms    Calculated P Axis 147 degrees    Calculated R Axis 54 degrees    Calculated T Axis 83 degrees    Diagnosis       !! AGE AND GENDER SPECIFIC ECG ANALYSIS !!  Unusual P axis, possible ectopic atrial rhythm  Low voltage QRS  Septal infarct , age undetermined  Abnormal ECG  No previous ECGs available     POC PT/INR    Collection Time: 11/15/21  8:35 PM   Result Value Ref Range    Prothrombin time (POC) 13.7 (H) 9.6 - 11.6 SECS    INR (POC) 1.2 0.9 - 1.2         All Micro Results     None          Other Studies:  CT CODE NEURO HEAD WO CONTRAST    Result Date: 11/15/2021  CT of the head without contrast. CLINICAL INDICATION: Headache for three days with double vision; code stroke PROCEDURE: Serial thin section axial images are obtained from the cranial vertex through the skull base without the administration of intravenous contrast. Radiation dose reduction techniques were used for this study. Our CT scanners use one or all of the following: Automated exposure control, adjusted of the mA and/or kV according to patient size, iterative reconstruction COMPARISON: No prior. FINDINGS: There is no acute intracranial hemorrhage, mass, or mass effect. No abnormal extra-axial fluid collections identified. There is no hydrocephalus. The basilar cisterns are widely patent. The gray-white matter brain parenchymal interface is well-maintained. No skull fracture or aggressive osseous lesion noted. The mastoid air cells and included paranasal sinuses are clear. No acute intracranial abnormality. Medications Administered     aspirin tablet 325 mg     Admin Date  11/15/2021 Action  Given Dose  325 mg Route  Oral Administered By  Maurilio Peters RN                Signed:  Andreas Mejía MD    Part of this note may have been written by using a voice dictation software. The note has been proof read but may still contain some grammatical/other typographical errors.

## 2021-11-16 NOTE — ASSESSMENT & PLAN NOTE
About a mild TIA with blurry vision but no other neurologic changes. On arrival was outside the window for TPA. NIH score 0. Was seen by teleneurology who recommends MRI in the morning, CTA/CTP, aspirin, statin, echo. We will also get carotids. Will monitor closely and adjust treatment as necessary.

## 2021-11-16 NOTE — PROGRESS NOTES
Spoke with Veverly Niles with MRI and she said there might not be a spot to do pt's MRI today. Will likely do tomorrow morning.

## 2021-11-16 NOTE — ASSESSMENT & PLAN NOTE
PT and OT to see and evaluate. I appreciate their involvement in the ongoing care of this patient. Would definitely benefit from increased exercise and improved diet.

## 2021-11-16 NOTE — ASSESSMENT & PLAN NOTE
Normally takes 30 to 40 units of 7030 on a sliding scale type approach 3 meals a day. Sugar appears controlled at this point. Will place on sliding scale, monitor closely, and adjust treatment as necessary.

## 2021-11-16 NOTE — ED NOTES
TRANSFER - OUT REPORT:    Verbal report given to aLla Steel on Alejandro Shin  being transferred to med/surg for routine progression of care       Report consisted of patients Situation, Background, Assessment and   Recommendations(SBAR). Information from the following report(s) SBAR was reviewed with the receiving nurse. Lines:   Peripheral IV 11/15/21 Right Hand (Active)   Site Assessment Clean, dry, & intact 11/15/21 2005   Phlebitis Assessment 0 11/15/21 2005   Dressing Status Clean, dry, & intact 11/15/21 2005   Dressing Type Transparent 11/15/21 2005       Peripheral IV 11/15/21 Left Antecubital (Active)   Site Assessment Clean, dry, & intact 11/15/21 2204   Phlebitis Assessment 0 11/15/21 2204   Infiltration Assessment 0 11/15/21 2204   Dressing Status Clean, dry, & intact 11/15/21 2204   Dressing Type Transparent 11/15/21 2204        Opportunity for questions and clarification was provided.       Patient transported with:   Registered Nurse

## 2021-11-16 NOTE — PROGRESS NOTES
Problem: Mobility Impaired (Adult and Pediatric)  Goal: *Acute Goals and Plan of Care (Insert Text)  Outcome: Progressing Towards Goal  Note: No goals as patient at baseline for functional mobility     PHYSICAL THERAPY: Initial Assessment, Discharge, and AM 11/16/2021  OBSERVATION:    Payor: ALLIED / Plan: Yodit Baig / Product Type: Commerical /       NAME/AGE/GENDER: Linnette Farley is a 49 y.o. male   PRIMARY DIAGNOSIS: TIA (transient ischemic attack) [G45.9] TIA (transient ischemic attack) TIA (transient ischemic attack)        ICD-10: Treatment Diagnosis:    · Other abnormalities of gait and mobility (R26.89)   Precaution/Allergies:  Patient has no known allergies. ASSESSMENT:     Mr. Jenaro Ahn presents as complaining of double vision and a mild headache. He reports this morning that his double vision comes and goes. He is normally independent and works. Wife present. LE strength and coordination symmetrical.  He ambulated in the ramos and did some steps without difficulty. His gait is slower but he reports this is normal for him. He needs UE assistance for single leg stance balance but he says this is normal for him. Will discharge as patient appears to be at baseline as he and wife say he is mobilizing like normal.      This section established at most recent assessment   PROBLEM LIST (Impairments causing functional limitations):  1. Pt. Will be d/c as he is mobilizing at baseline   INTERVENTIONS PLANNED: (Benefits and precautions of physical therapy have been discussed with the patient.)  1. Pt. Will be d/c as he is mobilizing at baseline     TREATMENT PLAN: Frequency/Duration: Pt.  Will be d/c as he is mobilizing at baseline  Rehabilitation Potential For Stated Goals: Good     REHAB RECOMMENDATIONS (at time of discharge pending progress):    Placement:  none  Equipment:    None at this time              HISTORY:   History of Present Injury/Illness (Reason for Referral):  Julieth Justin Chandu Isaacs is a 49 y.o. male who is being seen for TIA. The patient has vascular risk factors including coronary artery disease, hypertension, type 2 diabetes, and hyperlipidemia. He has been having intermittent episodes of double vision and blurry vision. He states that if he closes 1 eye his double vision will go away. For these reasons, he presented to the hospital. He currently does not have blurry vision or double vision. He has also been reporting headache. He has no history of headache. Symptoms are currently mild. He states that he takes aspirin on a regular basis at home. He is also on atorvastatin at home. His CT scan of the head was unremarkable. CTA showed mild narrowing at the right carotid bulb. Past Medical History/Comorbidities:   Mr. Arely Driscoll  has a past medical history of Allergic rhinitis, Coronary arteriosclerosis (02/14/2020), COVID-19 (03/25/2021), Diabetes ([address] Erectile dysfunction, GERD (gastroesophageal reflux disease), Hypercholesterolemia, Hypertension, Long term current use of insulin ([address] Lung nodule, Morbid obesity ([address] Obstructive sleep apnea syndrome, and Thyroid cancer ([address] Mr. Arely Driscoll  has a past surgical history that includes hx thyroidectomy (2011); hx uvulopalatopharyngoplasty (01/01/2001); pr cardiac surg procedure unlist (02/12/2020); hx heart catheterization (2020); hx cholecystectomy; hx tonsillectomy; hx orthopaedic (Right); and hx lumbar fusion (05/12/2021).   Social History/Living Environment:   Home Environment: Private residence  One/Two Story Residence: One story (basement)  Living Alone: No  Support Systems: Spouse/Significant Other, Child([place])  Patient Expects to be Discharged to[de-identified] House  Current DME Used/Available at Home: None  Prior Level of Function/Work/Activity:  Independent, works     Number of Personal Factors/Comorbidities that affect the Plan of Care: 1-2: MODERATE COMPLEXITY   EXAMINATION:   Most Recent Physical Functioning: Gross Assessment:  AROM: Within functional limits (LE's)  Strength: Within functional limits (LE's)  Coordination: Within functional limits (LE's)  Sensation: Intact (some neuropathy)               Posture:     Balance:  Sitting: Intact  Standing: Intact  Single leg stance with UE assist>5 sec Bed Mobility:  Supine to Sit:  (in chair)  Wheelchair Mobility:     Transfers:  Sit to Stand: Supervision; Independent  Stand to Sit: Supervision; Independent  Duration: 8 Minutes  Gait:     Base of Support: Widened  Speed/Frances: Delayed  Gait Abnormalities: Decreased step clearance; Trunk sway increased  Distance (ft): 200 Feet (ft)  Ambulation - Level of Assistance: Supervision; Stand-by assistance  Number of Stairs Trained: 3  Stairs - Level of Assistance: Stand-by assistance  Rail Use: Right   Interventions: Safety awareness training; Verbal cues      Body Structures Involved:  1. Bones  2. Joints  3. Muscles  4. Ligaments Body Functions Affected:  1. Movement Related Activities and Participation Affected:  1. Mobility   Number of elements that affect the Plan of Care: 3: MODERATE COMPLEXITY   CLINICAL PRESENTATION:   Presentation: Stable and uncomplicated: LOW COMPLEXITY   CLINICAL DECISION MAKING:   [facility] [place] 6 Clicks   Basic Mobility Inpatient Short Form  How much difficulty does the patient currently have. .. Unable A Lot A Little None   1. Turning over in bed (including adjusting bedclothes, sheets and blankets)? [] 1   [] 2   [] 3   [x] 4   2. Sitting down on and standing up from a chair with arms ( e.g., wheelchair, bedside commode, etc.)   [] 1   [] 2   [] 3   [x] 4   3. Moving from lying on back to sitting on the side of the bed? [] 1   [] 2   [] 3   [x] 4   How much help from another person does the patient currently need. .. Total A Lot A Little None   4. Moving to and from a bed to a chair (including a wheelchair)? [] 1   [] 2   [] 3   [x] 4   5. Need to walk in hospital room? [] 1   [] 2   [] 3   [x] 4   6. Climbing 3-5 steps with a railing? [] 1   [] 2   [] 3   [x] 4   © 2007, Trustees of [facility], under license to [employer]. All rights reserved      Score:  Initial: 24 Most Recent: X (Date: -- )    Interpretation of Tool:  Represents activities that are increasingly more difficult (i.e. Bed mobility, Transfers, Gait). Medical Necessity:     · Pt. Will be d/c as he is mobilizing at baseline. Reason for Services/Other Comments:  · Pt. Will be d/c as he is mobilizing at baseline. Use of outcome tool(s) and clinical judgement create a POC that gives a: Clear prediction of patient's progress: LOW COMPLEXITY            TREATMENT:   (In addition to Assessment/Re-Assessment sessions the following treatments were rendered)   Pre-treatment Symptoms/Complaints:  mild head ache and double vision  Pain: Initial:      Post Session:  0   assessment  Therapeutic Activity: (  8 Minutes ):  Therapeutic activities including Chair transfers, Ambulation on level ground, Stairs, and standing to improve mobility, strength, and balance. Required no Safety awareness training; Verbal cues to promote static and dynamic balance in standing. Braces/Orthotics/Lines/Etc:   · O2 Device: None (Room air)  Treatment/Session Assessment:    · Response to Treatment:  did fine  · Interdisciplinary Collaboration:   o Occupational Therapist  o Registered Nurse  · After treatment position/precautions:   o Up in chair  o Bed/Chair-wheels locked  o Bed in low position  o Call light within reach   · Compliance with Program/Exercises: Compliant most of the time  · Recommendations/Intent for next treatment session: \"Next visit will focus on  Pt. Will be d/c as he is mobilizing at baseline \".   Total Treatment Duration:  PT Patient Time In/Time Out  Time In: 2802  Time Out: [address], PT

## 2021-11-16 NOTE — CONSULTS
Rudy Salmeron MD  Medical Director  [address] [place] [address], [address] [facility]  Tel: [phone]       Physical Medicine & Rehabilitation Consult    Subjective:     Date of Consultation:  November 16, 2021  Referring Provider: Dr Jen Chacon is a 49 y.o. male who is being evaluated at the request of the Hosptialist service for rehabilitation recommendations following presentation under Code S     HPI: The patient is a r-hand dominant, previously functionally independent 52yo morbidly obese male with a PMH of DM2, hypothyroidism HTN, HLD, CAD s/p 5V CABG who presented to the ED with diplopia and mild headache. He has been having intermittent episodes of double vision and blurry vision. He states that if he closes 1 eye his double vision will go away. For these reasons, he presented to the hospital. At the time of neurological assessment , symptoms had resolved. His CT scan of the head was unremarkable. CTA showed mild narrowing at the right carotid bulb  LDL 72.6 and he is already on a statin at home and states that he takes a daily 81mg aspirin. hgbA1c 9.2 (last checked just last month at 9.9)  MRI pending ECHO pending. 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 (11/15/2021)      Other hyperlipidemia (11/15/2021)      Past Medical History:   Diagnosis Date    Allergic rhinitis     Coronary arteriosclerosis 02/14/2020    5v CABG    COVID-19 03/25/2021    only symptoms: fever and tired    Diabetes [address]     type 2, oral and insulin, FBS 180s, denies s&s of hypoglycemia    Erectile dysfunction     GERD (gastroesophageal reflux disease)     medicatioin    Hypercholesterolemia     Hypertension     medication    Long term current use of insulin (HCC)     Lung nodule     Morbid obesity ([facility]     Obstructive sleep apnea syndrome     CPAP     Thyroid cancer ([facility]     thyroidectomy      Past Surgical History:   Procedure Laterality Date    HX CHOLECYSTECTOMY      HX HEART CATHETERIZATION  2020    pt. had 5 bypass    HX LUMBAR FUSION  05/12/2021    L5-S1 TLIF Dr. Erlinda Guo Right     bicep tendon repair    HX THYROIDECTOMY  2011    HX TONSILLECTOMY      HX UVULOPALATOPHARYNGOPLASTY  01/01/2001    [place] CARDIAC SURG PROCEDURE UNLIST  02/12/2020    Cardiopulmonary bypass       History reviewed. No pertinent family history. Social History     Tobacco Use    Smoking status: Never Smoker    Smokeless tobacco: Former User     Types: Chew   Substance Use Topics    Alcohol use: Never     Prior to Admission medications    Medication Sig Start Date End Date Taking? Authorizing Provider   cyclobenzaprine (FLEXERIL) 10 mg tablet Take 1 Tab by mouth three (3) times daily as needed for Muscle Spasm(s). Indications: muscle spasm 5/17/21  Yes Jeanna Gould MD   cetirizine (ZYRTEC) 10 mg tablet Take 10 mg by mouth as needed. Yes Provider, Historical   acetaminophen (TYLENOL) 500 mg tablet Take 500 mg by mouth as needed. Yes Provider, Historical   atorvastatin (LIPITOR) 80 mg tablet TAKE 1 TABLET (80 MG) BY MOUTH NIGHTLY 1/3/21  Yes Provider, Historical   esomeprazole (NEXIUM) 40 mg capsule 40 mg two (2) times a day.  3/10/21  Yes Provider, Historical   fenofibrate (LOFIBRA) 160 mg tablet TAKE 1 TABLET BY MOUTH EVERY DAY 1/29/21  Yes Provider, Historical   glipiZIDE (GLUCOTROL) 10 mg tablet TAKE 1 TABLET BY MOUTH TWICE A DAY 1/10/21  Yes Provider, Historical   levothyroxine (SYNTHROID) 200 mcg tablet 200 mcg Daily (before breakfast). Pt takes 225 mcg daily (200 mcg tab and 25 mcg tab) 3/9/21  Yes Provider, Historical   levothyroxine (SYNTHROID) 25 mcg tablet 25 mcg Daily (before breakfast). Pt takes 225 mcg daily (200 mcg tab and 25 mcg tab) 21  Yes Provider, Historical   metoprolol tartrate (LOPRESSOR) 25 mg tablet TAKE 0.5 TABLETS (12.5 MG) BY MOUTH 2 (TWO) TIMES A DAY 1/3/21  Yes Provider, Historical   Januvia 100 mg tablet every evening. 21  Yes Provider, Historical   aspirin delayed-release 81 mg tablet Take  by mouth. Yes Provider, Historical   dapagliflozin (Farxiga) 10 mg tab tablet Take 10 mg by mouth daily. Yes Provider, Historical   insulin aspart protamine/insulin aspart (NovoLOG Mix 70-30 U-100 Insuln) 100 unit/mL (70-30) injection 30-40 Units by SubCUTAneous route Before breakfast, lunch, and dinner. Sliding scale   Yes Provider, Historical     No Known Allergies       Objective:     Vitals:  Blood pressure (!) 147/87, pulse 79, temperature 98.3 °F (36.8 °C), resp. rate 20, height 6' (1.829 m), weight 314 lb 8 oz (142.7 kg), SpO2 95 %.   Temp (24hrs), Av.5 °F (36.9 °C), Min:98.2 °F (36.8 °C), Max:98.8 °F (37.1 °C)      Intake and Output:   1901 -  0700  In: -   Out: 400 [Urine:400]      Labs/Studies:  Recent Results (from the past 72 hour(s))   GLUCOSE, POC    Collection Time: 11/15/21  7:34 PM   Result Value Ref Range    Glucose (POC) 100 65 - 100 mg/dL    Performed by Unknown Shade    METABOLIC PANEL, BASIC    Collection Time: 11/15/21  7:37 PM   Result Value Ref Range    Sodium 140 136 - 145 mmol/L    Potassium 3.4 (L) 3.5 - 5.1 mmol/L    Chloride 107 98 - 107 mmol/L    CO2 24 21 - 32 mmol/L    Anion gap 9 7 - 16 mmol/L    Glucose 105 (H) 65 - 100 mg/dL    BUN 17 6 - 23 MG/DL    Creatinine 1.20 0.8 - 1.5 MG/DL    GFR est AA >60 >60 ml/min/1.73m2    GFR est non-AA >60 >60 ml/min/1.73m2    Calcium 9.3 8.3 - 10.4 MG/DL   CBC WITH AUTOMATED DIFF Collection Time: 11/15/21  7:37 PM   Result Value Ref Range    WBC 7.5 4.3 - 11.1 K/uL    RBC 6.02 (H) 4.23 - 5.6 M/uL    HGB 15.9 13.6 - 17.2 g/dL    HCT 49.7 41.1 - 50.3 %    MCV 82.6 79.6 - 97.8 FL    MCH 26.4 26.1 - 32.9 PG    MCHC 32.0 31.4 - 35.0 g/dL    RDW 14.1 11.9 - 14.6 %    PLATELET 740 066 - 283 K/uL    MPV 11.1 9.4 - 12.3 FL    ABSOLUTE NRBC 0.00 0.0 - 0.2 K/uL    NEUTROPHILS 53 43 - 78 %    LYMPHOCYTES 34 13 - 44 %    MONOCYTES 9 4.0 - 12.0 %    EOSINOPHILS 3 0.5 - 7.8 %    BASOPHILS 1 0.0 - 2.0 %    IMMATURE GRANULOCYTES 0 0.0 - 5.0 %    ABS. NEUTROPHILS 3.9 1.7 - 8.2 K/UL    ABS. LYMPHOCYTES 2.6 0.5 - 4.6 K/UL    ABS. MONOCYTES 0.7 0.1 - 1.3 K/UL    ABS. EOSINOPHILS 0.2 0.0 - 0.8 K/UL    ABS. BASOPHILS 0.1 0.0 - 0.2 K/UL    ABS. IMM.  GRANS. 0.0 0.0 - 0.5 K/UL    DF AUTOMATED     CK WITH MB    Collection Time: 11/15/21  7:37 PM   Result Value Ref Range    CK - MB 3.8 (H) 1.5 - 3.6 ng/ml    CK-MB Index 2.5 %     21 - 215 U/L   TROPONIN-HIGH SENSITIVITY    Collection Time: 11/15/21  7:37 PM   Result Value Ref Range    Troponin-High Sensitivity 9.1 0 - 14 pg/mL   EKG, 12 LEAD, INITIAL    Collection Time: 11/15/21  7:54 PM   Result Value Ref Range    Ventricular Rate 74 BPM    Atrial Rate 82 BPM    P-R Interval 190 ms    QRS Duration 80 ms    Q-T Interval 396 ms    QTC Calculation (Bezet) 439 ms    Calculated P Axis 147 degrees    Calculated R Axis 54 degrees    Calculated T Axis 83 degrees    Diagnosis       !! AGE AND GENDER SPECIFIC ECG ANALYSIS !!  Unusual P axis, possible ectopic atrial rhythm  Low voltage QRS  Septal infarct , age undetermined  Abnormal ECG  No previous ECGs available     POC PT/INR    Collection Time: 11/15/21  8:35 PM   Result Value Ref Range    Prothrombin time (POC) 13.7 (H) 9.6 - 11.6 SECS    INR (POC) 1.2 0.9 - 1.2     DRUG SCREEN, URINE    Collection Time: 11/15/21  9:51 PM   Result Value Ref Range    PCP(PHENCYCLIDINE) Negative      BENZODIAZEPINES Negative COCAINE Negative      AMPHETAMINES Negative      METHADONE Negative      THC (TH-CANNABINOL) Negative      OPIATES Negative      BARBITURATES Negative     COVID-19 RAPID TEST    Collection Time: 11/15/21 10:22 PM   Result Value Ref Range    Specimen source NP Swab      COVID-19 rapid test Not detected NOTD     LIPID PANEL    Collection Time: 11/16/21  5:25 AM   Result Value Ref Range    Cholesterol, total 146 MG/DL    Triglyceride 187 (H) 35 - 150 MG/DL    HDL Cholesterol 36 (L) 40 - 60 MG/DL    LDL, calculated 72.6 <100 MG/DL    VLDL, calculated 37.4 (H) 6.0 - 23.0 MG/DL    CHOL/HDL Ratio 4.1 <200     HEMOGLOBIN A1C WITH EAG    Collection Time: 11/16/21  5:25 AM   Result Value Ref Range    Hemoglobin A1c 9.2 (H) 4.20 - 6.30 %    Est. average glucose 217 mg/dL   CBC W/O DIFF    Collection Time: 11/16/21  5:25 AM   Result Value Ref Range    WBC 6.4 4.3 - 11.1 K/uL    RBC 5.66 (H) 4.23 - 5.6 M/uL    HGB 14.8 13.6 - 17.2 g/dL    HCT 46.6 41.1 - 50.3 %    MCV 82.3 79.6 - 97.8 FL    MCH 26.1 26.1 - 32.9 PG    MCHC 31.8 31.4 - 35.0 g/dL    RDW 14.0 11.9 - 14.6 %    PLATELET 192 062 - 899 K/uL    MPV 11.1 9.4 - 12.3 FL    ABSOLUTE NRBC 0.00 0.0 - 0.2 K/uL   METABOLIC PANEL, BASIC    Collection Time: 11/16/21  5:25 AM   Result Value Ref Range    Sodium 139 136 - 145 mmol/L    Potassium 3.7 3.5 - 5.1 mmol/L    Chloride 107 98 - 107 mmol/L    CO2 26 21 - 32 mmol/L    Anion gap 6 (L) 7 - 16 mmol/L    Glucose 113 (H) 65 - 100 mg/dL    BUN 15 6 - 23 MG/DL    Creatinine 0.82 0.8 - 1.5 MG/DL    GFR est AA >60 >60 ml/min/1.73m2    GFR est non-AA >60 >60 ml/min/1.73m2    Calcium 8.2 (L) 8.3 - 10.4 MG/DL   GLUCOSE, POC    Collection Time: 11/16/21  8:12 AM   Result Value Ref Range    Glucose (POC) 143 (H) 65 - 100 mg/dL    Performed by Henok. GLUCOSE, POC    Collection Time: 11/16/21 12:26 PM   Result Value Ref Range    Glucose (POC) 137 (H) 65 - 100 mg/dL    Performed by Henok.       Functional Assessment:  Functional Assessment  Baseline Functional Status: Ambulated independently  Fall in Past 12 Months: No  Decline in Gait/Transfer/Balance: No  Decline in Capacity to Feed/Dress/Bathe: No  Developmental Delay: No  Chewing/Swallowing Problems: No  Difficulty with Secretions: No  Speech Slurred/Thick/Garbled: No     Fagan Score:        Speech Assessment:  Dysphagia Screening  Vocal Quality/Secretions: Normal  History of Dysphagia: No  O2 Saturation: Normal  Alertness: Normal  Pre-Swallow Assessment Score: 0  Purees: No difficulty noted  Water by Cup: No difficulty noted  Water by Straw: No difficulty noted                Ambulation:  Activity and Safety  Activity Level: Up ad elena  Ambulate: Ambulate in room  Activity: In bed  Activity Assistance: No assistance needed  Mode of Transportation: Wheelchair, Ambulatory  Repositioned: Head of bed elevated (degrees)  Patient Turned: Turns self  Head of Bed Elevated: Self regulated  Safety Measures: Bed in low position, Bed/Chair-Wheels locked, Call light within reach     Impression / Assessment:     Principal Problem:    TIA (transient ischemic attack) (11/15/2021)    Active Problems: Morbid obesity ([address] (3/23/2021)      Hypertension ()      Overview: medication      Type 2 diabetes mellitus, with long-term current use of insulin (HCC) ()      Overview: type 2, oral and insulin, FBS 180s, denies s&s of hypoglycemia      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  Focus on secondary stroke prevention with Plavix, ASA and hi intensity statin. Need tighter diabetic control. bp mgt. Wt loss. -PT AND OT HAVE ALREADY ASSESSED PATIENT AND NO FURTHER THERAPIES ARE RECOMMENDED    Reviewed Therapies / Catherine Goodwin / Kaleb Bound / Records  >30 min chart review    Thank you very much for this referral. We appreciate the opportunity to participate in this patient's care. We will continue to follow.

## 2021-11-16 NOTE — PROGRESS NOTES
Problem: Self Care Deficits Care Plan (Adult)  Goal: *Acute Goals and Plan of Care (Insert Text)  Outcome: Progressing Towards Goal     OCCUPATIONAL THERAPY: Initial Assessment, Daily Note, Discharge, and AM 11/16/2021  OBSERVATION:    Payor: ALLIED / Plan: Vanessa Lainez / Product Type: Commerical /      NAME/AGE/GENDER: Eri Toussaint is a 49 y.o. male   PRIMARY DIAGNOSIS:  TIA (transient ischemic attack) [G45.9] TIA (transient ischemic attack) TIA (transient ischemic attack)        ICD-10: Treatment Diagnosis:    · Other lack of cordination (R27.8)   Precautions/Allergies:     Patient has no known allergies. ASSESSMENT:     Mr. Colin Maldonado presents as complaining of double vision and a mild headache. He reports this morning that his double vision comes and goes. He is normally independent and works. Wife present. UE strength and coordination WFL. He ambulated into the bathroom. Pt independent with toileting. Will discharge as patient appears to be at baseline. This section established at most recent assessment   PROBLEM LIST (Impairments causing functional limitations):   INTERVENTIONS PLANNED: (Benefits and precautions of occupational therapy have been discussed with the patient.)       TREATMENT PLAN: Frequency/Duration: discharge OT. Rehabilitation Potential For Stated Goals: discharge OT     REHAB RECOMMENDATIONS (at time of discharge pending progress):    Placement: It is my opinion, based on this patient's performance to date, that Mr. Colin Maldonado may benefit from being discharged with NO further skilled therapy due to a proven ability to function at baseline.   Equipment:    None at this time              OCCUPATIONAL PROFILE AND HISTORY:   History of Present Injury/Illness (Reason for Referral):  TIA  Past Medical History/Comorbidities:   Mr. Colin Maldonado  has a past medical history of Allergic rhinitis, Coronary arteriosclerosis (02/14/2020), COVID-19 (03/25/2021), Diabetes ([facility] [address] Erectile dysfunction, GERD (gastroesophageal reflux disease), Hypercholesterolemia, Hypertension, Long term current use of insulin ([address] Lung nodule, Morbid obesity ([address] Obstructive sleep apnea syndrome, and Thyroid cancer ([facility] [address] Mr. Alize Amin  has a past surgical history that includes hx thyroidectomy (2011); hx uvulopalatopharyngoplasty (01/01/2001); pr cardiac surg procedure unlist (02/12/2020); hx heart catheterization (2020); hx cholecystectomy; hx tonsillectomy; hx orthopaedic (Right); and hx lumbar fusion (05/12/2021). Social History/Living Environment:   Home Environment: Private residence  One/Two Story Residence: One story (basement)  Living Alone: No  Support Systems: Spouse/Significant Other, Child([place])  Patient Expects to be Discharged to[de-identified] House  Current DME Used/Available at Home: None  Prior Level of Function/Work/Activity:  independent     Number of Personal Factors/Comorbidities that affect the Plan of Care: Brief history (0):  LOW COMPLEXITY   ASSESSMENT OF OCCUPATIONAL PERFORMANCE[de-identified]   Activities of Daily Living:   Basic ADLs (From Assessment) Complex ADLs (From Assessment)   Feeding: Independent  Oral Facial Hygiene/Grooming: Independent  Bathing: Independent  Upper Body Dressing: Independent  Lower Body Dressing: Independent  Toileting: Independent     Grooming/Bathing/Dressing Activities of Daily Living     Cognitive Retraining  Safety/Judgement: Awareness of environment                 Functional Transfers  Bathroom Mobility: Independent  Toilet Transfer : Independent  Shower Transfer: Independent     Bed/Mat Mobility  Rolling: Independent  Supine to Sit: Independent  Sit to Supine: Independent  Sit to Stand: Independent  Stand to Sit: Independent  Bed to Chair: Independent  Scooting: Independent     Most Recent Physical Functioning:   Gross Assessment:  AROM: Within functional limits (BUE)  Strength:  Within functional limits (BUE)  Coordination: Within functional limits (BUE)  Tone: Normal  Sensation: Intact               Posture:     Balance:  Sitting: Intact  Standing: Intact Bed Mobility:  Rolling: Independent  Supine to Sit: Independent  Sit to Supine: Independent  Scooting: Independent  Wheelchair Mobility:     Transfers:  Sit to Stand: Independent  Stand to Sit: Independent  Bed to Chair: Independent  Duration: 8 Minutes            Patient Vitals for the past 6 hrs:   BP BP Patient Position SpO2 Pulse   21 0807 (!) 147/87 Sitting 95 % 79       Mental Status  Neurologic State: Alert  Orientation Level: Oriented X4  Cognition: Follows commands  Perception: Appears intact  Perseveration: No perseveration noted  Safety/Judgement: Awareness of environment                          Physical Skills Involved:  1. Balance  2. Strength  3. Activity Tolerance Cognitive Skills Affected (resulting in the inability to perform in a timely and safe manner): 1. none Psychosocial Skills Affected:  1. none   Number of elements that affect the Plan of Care: 1-3:  LOW COMPLEXITY   CLINICAL DECISION MAKIN[address] [place] [id number] AM-PAC 6 Clicks   Daily Activity Inpatient Short Form  How much help from another person does the patient currently need. .. Total A Lot A Little None   1. Putting on and taking off regular lower body clothing? [] 1   [] 2   [] 3   [x] 4   2. Bathing (including washing, rinsing, drying)? [] 1   [] 2   [] 3   [x] 4   3. Toileting, which includes using toilet, bedpan or urinal?   [] 1   [] 2   [] 3   [x] 4   4. Putting on and taking off regular upper body clothing? [] 1   [] 2   [] 3   [x] 4   5. Taking care of personal grooming such as brushing teeth? [] 1   [] 2   [] 3   [x] 4   6. Eating meals? [] 1   [] 2   [] 3   [x] 4   © , Trustees of [address] [place] [id number], under license to [employer].  All rights reserved      Score:  Initial: 24 Most Recent: X (Date: -- )    Interpretation of Tool:  Represents activities that are increasingly more difficult (i.e. Bed mobility, Transfers, Gait). Medical Necessity:     · Discharge OT  Reason for Services/Other Comments:  · Discharge OT   Use of outcome tool(s) and clinical judgement create a POC that gives a: LOW COMPLEXITY         TREATMENT:   (In addition to Assessment/Re-Assessment sessions the following treatments were rendered)     Pre-treatment Symptoms/Complaints:  tolerated evaluation  Pain: Initial:   Pain Intensity 1: 0  Post Session:  0     Self Care: (8): Procedure(s) (per grid) utilized to improve and/or restore self-care/home management as related to toileting and functional transfers . Required minimal verbal and   cueing to facilitate activities of daily living skills and compensatory activities. Assessment/Reassessment     Braces/Orthotics/Lines/Etc:   · O2 Device: None (Room air)  Treatment/Session Assessment:    · Response to Treatment:  tolerated eval  · Interdisciplinary Collaboration:   o Physical Therapist  o Occupational Therapist  o Registered Nurse  · After treatment position/precautions:   o Up in chair  o Bed/Chair-wheels locked  o Caregiver at bedside  o Call light within reach  o RN notified   · Compliance with Program/Exercises: Compliant all of the time. · Recommendations/Intent for next treatment session: \"Next visit will focus on advancements to more challenging activities and reduction in assistance provided\".   Total Treatment Duration:  OT Patient Time In/Time Out  Time In: 0940  Time Out: Asim You

## 2021-11-16 NOTE — PROGRESS NOTES
END OF SHIFT NOTE:    Intake/Output  11/15 1901 - 11/16 0700  In: -   Out: 400 [Urine:400]   Voiding: YES  Catheter: NO  Drain:              Stool:  0 occurrences. Emesis:  0 occurrences. VITAL SIGNS  Patient Vitals for the past 12 hrs:   Temp Pulse Resp BP SpO2   11/16/21 0414 98.8 °F (37.1 °C) 70 16 123/64 97 %   11/16/21 0007 98.6 °F (37 °C) 73 16 (!) 151/86 95 %   11/15/21 2156  75 16 132/62 96 %   11/15/21 1927 98.2 °F (36.8 °C) 80 18 (!) 181/94 98 %       Pain Assessment  Pain 1  Pain Scale 1: Numeric (0 - 10) (11/16/21 0602)  Pain Intensity 1: 0 (11/16/21 0602)  Patient Stated Pain Goal: 0 (11/16/21 0602)  Pain Reassessment 1: Patient resting w/respiratory rate greater than 10 (11/16/21 0100)  Pain Location 1: Head (11/16/21 0023)  Pain Intervention(s) 1: Other (comment) (dim lights) (11/16/21 0023)    Ambulating  Yes    Additional Information: pt had an uneventful evening    Shift report given to oncoming nurse at the bedside.     Johnathon Shields RN

## 2021-11-16 NOTE — CONSULTS
Consult    Patient: Mandy Celis MRN: [id number]     YOB: 1965  Age: 49 y.o. Sex: male      Subjective:      Mandy Celis is a 49 y.o. male who is being seen for TIA. The patient has vascular risk factors including coronary artery disease, hypertension, type 2 diabetes, and hyperlipidemia. He has been having intermittent episodes of double vision and blurry vision. He states that if he closes 1 eye his double vision will go away. For these reasons, he presented to the hospital. He currently does not have blurry vision or double vision. He has also been reporting headache. He has no history of headache. Symptoms are currently mild. He states that he takes aspirin on a regular basis at home. He is also on atorvastatin at home. His CT scan of the head was unremarkable. CTA showed mild narrowing at the right carotid bulb. Past Medical History:   Diagnosis Date    Allergic rhinitis     Coronary arteriosclerosis 02/14/2020    5v CABG    COVID-19 03/25/2021    only symptoms: fever and tired    Diabetes ([address]     type 2, oral and insulin, FBS 180s, denies s&s of hypoglycemia    Erectile dysfunction     GERD (gastroesophageal reflux disease)     medicatioin    Hypercholesterolemia     Hypertension     medication    Long term current use of insulin (HCC)     Lung nodule     Morbid obesity ([address]     Obstructive sleep apnea syndrome     CPAP     Thyroid cancer ([address]     thyroidectomy     Past Surgical History:   Procedure Laterality Date    HX CHOLECYSTECTOMY      HX HEART CATHETERIZATION  2020    pt. had 5 bypass    HX LUMBAR FUSION  05/12/2021    L5-S1 TLIF Dr. Robin Hope Right     bicep tendon repair    HX THYROIDECTOMY  2011    HX TONSILLECTOMY      HX UVULOPALATOPHARYNGOPLASTY  01/01/2001    [place] CARDIAC SURG PROCEDURE UNLIST  02/12/2020    Cardiopulmonary bypass       History reviewed. No pertinent family history.   Social History Tobacco Use    Smoking status: Never Smoker    Smokeless tobacco: Former User     Types: Chew   Substance Use Topics    Alcohol use: Never      Current Facility-Administered Medications   Medication Dose Route Frequency Provider Last Rate Last Admin    aspirin delayed-release tablet 81 mg  81 mg Oral DAILY Erica Samaniego MD   81 mg at 11/16/21 8331    atorvastatin (LIPITOR) tablet 80 mg  80 mg Oral QHS Erica Samaniego MD   80 mg at 11/16/21 0128    cetirizine (ZYRTEC) tablet 10 mg  10 mg Oral DAILY PRN Erica Samaniego MD        cyclobenzaprine (FLEXERIL) tablet 10 mg  10 mg Oral TID PRN Erica Samaniego MD        dapagliflozin Brannonvalerie [place]) tablet 10 mg (Patient Supplied)  10 mg Oral DAILY Erica Samaniego MD        pantoprazole (PROTONIX) tablet 40 mg  40 mg Oral ACB&D Erica Samaniego MD   40 mg at 11/16/21 8972    fenofibrate (LOFIBRA) tablet 160 mg  160 mg Oral DAILY Erica Samaniego MD   160 mg at 11/16/21 5087    glipiZIDE (GLUCOTROL) tablet 10 mg  10 mg Oral ACB&D Erica Samaniego MD   10 mg at 11/16/21 0939    alogliptin (NESINA) tablet 25 mg  25 mg Oral DAILY Erica Samaniego MD   25 mg at 11/16/21 2122    metoprolol tartrate (LOPRESSOR) tablet 12.5 mg  12.5 mg Oral BID Erica Samaniego MD   12.5 mg at 11/16/21 3585    sodium chloride (NS) flush 5-40 mL  5-40 mL IntraVENous Q8H Erica Samaniego MD   10 mL at 11/16/21 0600    sodium chloride (NS) flush 5-40 mL  5-40 mL IntraVENous PRN Erica Samaniego MD        [facility]dansetron [facility] injection 4 mg  4 mg IntraVENous Q6H PRN Erica Samaniego MD        enoxaparin (LOVENOX) injection 40 mg  40 mg SubCUTAneous Q12H Erica Samaniego MD   40 mg at 11/16/21 0940    insulin lispro (HUMALOG) injection   SubCUTAneous AC&HS Erica Samaniego MD        levothyroxine (SYNTHROID) tablet 225 mcg  225 mcg Oral ACB Sandra Portillo MD   225 mcg at 11/16/21 8906        No Known Allergies    Review of Systems:  CONSTITUTIONAL: No weight loss, fever, chills, weakness or fatigue. HEENT: Eyes: No visual loss, blurred vision, double vision or yellow sclerae. Ears, Nose, Throat: No hearing loss, sneezing, congestion, runny nose or sore throat. SKIN: No rash or itching. CARDIOVASCULAR: No chest pain, chest pressure or chest discomfort. No palpitations or edema. RESPIRATORY: No shortness of breath, cough or sputum. GASTROINTESTINAL: No anorexia, nausea, vomiting or diarrhea. No abdominal pain or blood. GENITOURINARY: no burning with urination. NEUROLOGICAL: Positive for headache. Otherwise, see above. No focal weakness. No focal sensory changes. MUSCULOSKELETAL: No muscle, back pain, joint pain or stiffness. HEMATOLOGIC: No anemia, bleeding or bruising. LYMPHATICS: No enlarged nodes. No history of splenectomy. PSYCHIATRIC: No history of depression or anxiety. ENDOCRINOLOGIC: No reports of sweating, cold or heat intolerance. No polyuria or polydipsia. ALLERGIES: No history of asthma, hives, eczema or rhinitis. Objective:     Vitals:    11/15/21 2156 11/16/21 0007 11/16/21 0414 11/16/21 0807   BP: 132/62 (!) 151/86 123/64 (!) 147/87   Pulse: 75 73 70 79   Resp: 16 16 16 20   Temp:  98.6 °F (37 °C) 98.8 °F (37.1 °C) 98.3 °F (36.8 °C)   SpO2: 96% 95% 97% 95%   Weight:  314 lb 8 oz (142.7 kg)     Height:            Physical Exam:  General - Well developed, well nourished, in no apparent distress. Pleasant and conversant. HEENT - Normocephalic, atraumatic. Neck -No masses   Lungs - Normal work of breathing   Abdomen - No masses   Extremities - No edema and no rashes. Psychiatric - Mood and affect are normal    Neurological examination - Comprehension, attention , memory and reasoning are intact. Language and speech are normal. On cranial nerve examination pupils are equal round and reactive to light (cranial nerve II and III). Visual acuity is adequate (cranial nerve II).  Visual fields are full to finger confrontation (CN II). Extraocular motility is normal (CN III, IV, VI). Face is symmetric (CN VII). Hearing is grossly intact to verbal communication (CN VIII). Motor examination - There is normal bulk. Power is full throughout (with spontaneous movement against environmental objects). Cerebellar examination is normal with finger-no-nose testing. Gait and stance are normal.       Lab Results   Component Value Date/Time    Cholesterol, total 146 11/16/2021 05:25 AM    HDL Cholesterol 36 (L) 11/16/2021 05:25 AM    LDL, calculated 72.6 11/16/2021 05:25 AM    VLDL, calculated 37.4 (H) 11/16/2021 05:25 AM    Triglyceride 187 (H) 11/16/2021 05:25 AM    CHOL/HDL Ratio 4.1 11/16/2021 05:25 AM        Lab Results   Component Value Date/Time    Hemoglobin A1c 9.2 (H) 11/16/2021 05:25 AM        CT Results (most recent): Personally Reviewed   Results from Hospital Encounter encounter on 11/15/21    CT PERF W CBF    Narrative  CT Perfusion Imaging    INDICATION:  TIA    CT perfusion imaging of the brain was performed after the administration of  intravenous contrast.  Perfusion maps and perfusion analysis output were  generated using the vis ai perfusion processing software algorithm. Radiation  dose reduction techniques were used for this study: All CT scans performed at  this facility use one or all of the following: Automated exposure control,  adjustment of the mA and/or kVp according to patient's size, iterative  reconstruction. FINDINGS:    CBF < 30% volume:  0 ml   (core infarction volume greater than 50 cc associated  with poor outcomes)  Tmax > 6 seconds: 0 ml  Tmax/CBF Mismatch Volume: 0 ml  Tmax/CBF Mismatch Ratio: N/A  Hypoperfusion Intensity Ratio: 0   (values greater than 0.5 associated with poor  outcome)  Tmax > 10 seconds Volume: 0 ml   (volume greater than 100 mL is associated with  poor outcome)    Impression  1. No evidence of core infarct or ischemic penumbra.       Results for orders placed or performed during the hospital encounter of 11/15/21   EKG, 12 LEAD, INITIAL   Result Value Ref Range    Ventricular Rate 74 BPM    Atrial Rate 82 BPM    P-R Interval 190 ms    QRS Duration 80 ms    Q-T Interval 396 ms    QTC Calculation (Bezet) 439 ms    Calculated P Axis 147 degrees    Calculated R Axis 54 degrees    Calculated T Axis 83 degrees    Diagnosis       !! AGE AND GENDER SPECIFIC ECG ANALYSIS !!  Unusual P axis, possible ectopic atrial rhythm  Low voltage QRS  Septal infarct , age undetermined  Abnormal ECG  No previous ECGs available              I personally reviewed the patient's CT scan of the head, CT perfusion, and CT angiogram.      Assessment:     61-year-old man with vascular risk factors presenting to the hospital with intermittent double vision. I agree that TIA should be high on the differential diagnosis. Vascular imaging is reassuring. He is at high risk for small vessel ischemic disease. Plan:     · ASA 81 mg daily. Loaded with Plavix 300 mg and continue 75 mg daily for 21 days  · High intensity statin  · Neurochecks Q4H  · BMRI  · Telemetry and echocardiogram with bubble study  · PT/OT/ST  · Lovenox or heparin for DVT ppx   · BP management - allow permissive HTN to 220/120, treat with labetalol 10 mg IV or nicardipine gtt  · Depression screening prior to discharge  · Further recommendations after brain MRI      Signed By: Salvatore Foy DO     November 16, 2021        Addendum: He should not take nonsteroidal anti-inflammatory drugs such as ibuprofen while on dual antiplatelet therapy. I discussed this with patient and he voiced understanding.

## 2021-11-16 NOTE — ED PROVIDER NOTES
About 3 days he has had some slight headache and occipital discomfort but no fever or anything else associated with this that he is aware of about noon today he had acute onset of what he calls double vision and feels as though his right eyes not moving correctly. No focal weakness to her arm or leg. No history of known malignancy. Does have a history of hypertension his blood pressure been mildly elevated at home. He has missed no medications. Patient works as a  states when he was leaving work today he looked up that a crane and when asked whether it was a diplopia that was horizontal or vertical he describes screen looking like to cranes but diagonal from each other    The history is provided by the patient. Headache   This is a new problem. The current episode started more than 2 days ago. The headache is aggravated by an unknown factor. Past Medical History:   Diagnosis Date    Allergic rhinitis     Coronary arteriosclerosis 02/14/2020    5v CABG    COVID-19 03/25/2021    only symptoms: fever and tired    Diabetes ([address]     type 2, oral and insulin, FBS 180s, denies s&s of hypoglycemia    Erectile dysfunction     GERD (gastroesophageal reflux disease)     medicatioin    Hypercholesterolemia     Hypertension     medication    Long term current use of insulin (HCC)     Lung nodule     Morbid obesity ([address]     Obstructive sleep apnea syndrome     CPAP     Thyroid cancer ([address]     thyroidectomy       Past Surgical History:   Procedure Laterality Date    HX CHOLECYSTECTOMY      HX HEART CATHETERIZATION  2020    pt. had 5 bypass    HX LUMBAR FUSION  05/12/2021    L5-S1 TLIF Dr. Slade Giraldo Right     bicep tendon repair    HX THYROIDECTOMY  2011    HX TONSILLECTOMY      HX UVULOPALATOPHARYNGOPLASTY  01/01/2001    [place] CARDIAC SURG PROCEDURE UNLIST  02/12/2020    Cardiopulmonary bypass          No family history on file.     Social History     Socioeconomic History    Marital status:      Spouse name: Not on file    Number of children: Not on file    Years of education: Not on file    Highest education level: Not on file   Occupational History    Occupation:    Tobacco Use    Smoking status: Never Smoker    Smokeless tobacco: Former User     Types: Chew   Vaping Use    Vaping Use: Never used   Substance and Sexual Activity    Alcohol use: Never    Drug use: Never    Sexual activity: Not on file   Other Topics Concern    Not on file   Social History Narrative    Not on file     Social Determinants of Health     Financial Resource Strain:     Difficulty of Paying Living Expenses: Not on file   Food Insecurity:     Worried About [address] in the Last Year: Not on file    [address] in the Last Year: Not on file   Transportation Needs:     Lack of Transportation (Medical): Not on file    Lack of Transportation (Non-Medical): Not on file   Physical Activity:     Days of Exercise per Week: Not on file    Minutes of Exercise per Session: Not on file   Stress:     Feeling of Stress : Not on file   Social Connections:     Frequency of Communication with Friends and Family: Not on file    Frequency of Social Gatherings with Friends and Family: Not on file    Attends [employer] Services: Not on file    Active Member of [address] [place] [address] or Organizations: Not on file    Attends Club or Organization Meetings: Not on file    Marital Status: Not on file   Intimate Partner Violence:     Fear of Current or Ex-Partner: Not on file    Emotionally Abused: Not on file    Physically Abused: Not on file    Sexually Abused: Not on file   Housing Stability:     Unable to Pay for Housing in the Last Year: Not on file    Number of [place] in the Last Year: Not on file    Unstable Housing in the Last Year: Not on file         ALLERGIES: Patient has no known allergies. Review of Systems   Constitutional: Negative.     Eyes: Positive for visual disturbance. Respiratory: Negative. Cardiovascular: Negative. Gastrointestinal: Negative. Musculoskeletal: Negative. Neurological: Positive for headaches. All other systems reviewed and are negative. There were no vitals filed for this visit. Physical Exam  Vitals and nursing note reviewed. Constitutional:       General: He is not in acute distress. Appearance: He is well-developed. HENT:      Head: Atraumatic. Eyes:      General: No scleral icterus. Neck:      Comments: Easy chin on chest  Cardiovascular:      Rate and Rhythm: Normal rate. Pulmonary:      Effort: Pulmonary effort is normal. No respiratory distress. Abdominal:      General: Abdomen is flat. Palpations: Abdomen is soft. Musculoskeletal:         General: Normal range of motion. Cervical back: Neck supple. Skin:     General: Skin is warm and dry. Neurological:      Mental Status: He is oriented to person, place, and time. Cranial Nerves: No cranial nerve deficit. Motor: No weakness. Coordination: Coordination normal.      Gait: Gait normal.      Comments: Patient at times on exam appears to have some right movement disorder is not persistent and subtle tests   Psychiatric:         Thought Content: Thought content normal.          MDM  Number of Diagnoses or Management Options  Visual disturbance  Diagnosis management comments: NIH of 0 but acute visual abnormality. Initial CT scan is negative. He is outside of any TPA window on initial arrival.  Has had some headache for several days. Consideration of an atypical migraine if all the studies are negative. No history of MS or similar neurologic issues.  patient will need inpatient work-up       Amount and/or Complexity of Data Reviewed  Clinical lab tests: ordered and reviewed  Tests in the radiology section of CPT®: reviewed and ordered  Tests in the medicine section of CPT®: (EKG  Heart rate 74  Rhythm: Sinus  No acute changes)  Obtain history from someone other than the patient: yes (Wife)  Review and summarize past medical records: yes  Discuss the patient with other providers: yes  Independent visualization of images, tracings, or specimens: yes    Risk of Complications, Morbidity, and/or Mortality  Presenting problems: moderate  Diagnostic procedures: moderate  Management options: moderate           Procedures

## 2021-11-17 ENCOUNTER — APPOINTMENT (OUTPATIENT)
Dept: NON INVASIVE DIAGNOSTICS | Age: 56
End: 2021-11-17
Attending: EMERGENCY MEDICINE
Payer: COMMERCIAL

## 2021-11-17 ENCOUNTER — APPOINTMENT (OUTPATIENT)
Dept: MRI IMAGING | Age: 56
End: 2021-11-17
Attending: EMERGENCY MEDICINE
Payer: COMMERCIAL

## 2021-11-17 VITALS
BODY MASS INDEX: 42.53 KG/M2 | RESPIRATION RATE: 20 BRPM | TEMPERATURE: 98.3 F | WEIGHT: 314 LBS | OXYGEN SATURATION: 96 % | HEIGHT: 72 IN | DIASTOLIC BLOOD PRESSURE: 82 MMHG | HEART RATE: 75 BPM | SYSTOLIC BLOOD PRESSURE: 144 MMHG

## 2021-11-17 LAB
ALBUMIN SERPL-MCNC: 3.8 G/DL (ref 3.5–5)
ALBUMIN/GLOB SERPL: 1.1 {RATIO} (ref 1.2–3.5)
ALP SERPL-CCNC: 59 U/L (ref 50–136)
ALT SERPL-CCNC: 28 U/L (ref 12–65)
ANION GAP SERPL CALC-SCNC: 4 MMOL/L (ref 7–16)
AST SERPL-CCNC: 25 U/L (ref 15–37)
BILIRUB SERPL-MCNC: 1.4 MG/DL (ref 0.2–1.1)
BUN SERPL-MCNC: 18 MG/DL (ref 6–23)
CALCIUM SERPL-MCNC: 8.9 MG/DL (ref 8.3–10.4)
CHLORIDE SERPL-SCNC: 106 MMOL/L (ref 98–107)
CO2 SERPL-SCNC: 25 MMOL/L (ref 21–32)
CREAT SERPL-MCNC: 0.88 MG/DL (ref 0.8–1.5)
ECHO AO ASC DIAM: 3.5 CM
ECHO AO ROOT DIAM: 3.1 CM
ECHO AV AREA PEAK VELOCITY: 3.05 CM2
ECHO AV AREA VTI: 3.33 CM2
ECHO AV AREA/BSA PEAK VELOCITY: 1.2 CM2/M2
ECHO AV AREA/BSA VTI: 1.3 CM2/M2
ECHO AV MEAN GRADIENT: 4 MMHG
ECHO AV PEAK GRADIENT: 7 MMHG
ECHO AV PEAK VELOCITY: 136 CM/S
ECHO AV VTI: 24.9 CM
ECHO LA AREA 2C: 23.2 CM2
ECHO LA AREA 4C: 22.7 CM2
ECHO LA MAJOR AXIS: 6.7 CM
ECHO LA MINOR AXIS: 2.6 CM
ECHO LV E' LATERAL VELOCITY: 11.6 CM/S
ECHO LV E' SEPTAL VELOCITY: 10.5 CM/S
ECHO LV EDV A2C: 77 CM3
ECHO LV EDV A4C: 100 CM3
ECHO LV ESV A2C: 30 CM3
ECHO LV ESV A4C: 42 CM3
ECHO LV INTERNAL DIMENSION DIASTOLIC: 4.79 CM (ref 4.2–5.9)
ECHO LV INTERNAL DIMENSION SYSTOLIC: 3.61 CM
ECHO LV IVSD: 1.12 CM (ref 0.6–1)
ECHO LV MASS 2D: 178.9 G (ref 88–224)
ECHO LV MASS INDEX 2D: 69.4 G/M2 (ref 49–115)
ECHO LV POSTERIOR WALL DIASTOLIC: 0.96 CM (ref 0.6–1)
ECHO LVOT DIAM: 2.2 CM
ECHO LVOT PEAK GRADIENT: 5 MMHG
ECHO LVOT VTI: 21.8 CM
ECHO MV A VELOCITY: 63.2 CM/S
ECHO MV E DECELERATION TIME (DT): 342 MS
ECHO MV E VELOCITY: 66.1 CM/S
ECHO MV E/A RATIO: 1.05
ECHO MV E/E' LATERAL: 5.7
ECHO MV E/E' RATIO (AVERAGED): 6
ECHO MV E/E' SEPTAL: 6.3
ECHO RV INTERNAL DIMENSION: 3.7 CM
ERYTHROCYTE [DISTWIDTH] IN BLOOD BY AUTOMATED COUNT: 14.1 % (ref 11.9–14.6)
GLOBULIN SER CALC-MCNC: 3.5 G/DL (ref 2.3–3.5)
GLUCOSE BLD STRIP.AUTO-MCNC: 165 MG/DL (ref 65–100)
GLUCOSE BLD STRIP.AUTO-MCNC: 196 MG/DL (ref 65–100)
GLUCOSE SERPL-MCNC: 182 MG/DL (ref 65–100)
HCT VFR BLD AUTO: 50.3 % (ref 41.1–50.3)
HGB BLD-MCNC: 16 G/DL (ref 13.6–17.2)
MAGNESIUM SERPL-MCNC: 2.3 MG/DL (ref 1.8–2.4)
MCH RBC QN AUTO: 26.2 PG (ref 26.1–32.9)
MCHC RBC AUTO-ENTMCNC: 31.8 G/DL (ref 31.4–35)
MCV RBC AUTO: 82.5 FL (ref 79.6–97.8)
NRBC # BLD: 0 K/UL (ref 0–0.2)
PLATELET # BLD AUTO: 248 K/UL (ref 150–450)
PMV BLD AUTO: 10.8 FL (ref 9.4–12.3)
POTASSIUM SERPL-SCNC: 4 MMOL/L (ref 3.5–5.1)
PROT SERPL-MCNC: 7.3 G/DL (ref 6.3–8.2)
RBC # BLD AUTO: 6.1 M/UL (ref 4.23–5.6)
SERVICE CMNT-IMP: ABNORMAL
SERVICE CMNT-IMP: ABNORMAL
SODIUM SERPL-SCNC: 135 MMOL/L (ref 136–145)
WBC # BLD AUTO: 5 K/UL (ref 4.3–11.1)

## 2021-11-17 PROCEDURE — 74011250637 HC RX REV CODE- 250/637: Performed by: STUDENT IN AN ORGANIZED HEALTH CARE EDUCATION/TRAINING PROGRAM

## 2021-11-17 PROCEDURE — 74011250636 HC RX REV CODE- 250/636: Performed by: EMERGENCY MEDICINE

## 2021-11-17 PROCEDURE — 80053 COMPREHEN METABOLIC PANEL: CPT

## 2021-11-17 PROCEDURE — 85027 COMPLETE CBC AUTOMATED: CPT

## 2021-11-17 PROCEDURE — 82962 GLUCOSE BLOOD TEST: CPT

## 2021-11-17 PROCEDURE — 36415 COLL VENOUS BLD VENIPUNCTURE: CPT

## 2021-11-17 PROCEDURE — 96372 THER/PROPH/DIAG INJ SC/IM: CPT

## 2021-11-17 PROCEDURE — G0378 HOSPITAL OBSERVATION PER HR: HCPCS

## 2021-11-17 PROCEDURE — 74011636637 HC RX REV CODE- 636/637: Performed by: EMERGENCY MEDICINE

## 2021-11-17 PROCEDURE — 74011000250 HC RX REV CODE- 250: Performed by: STUDENT IN AN ORGANIZED HEALTH CARE EDUCATION/TRAINING PROGRAM

## 2021-11-17 PROCEDURE — 70551 MRI BRAIN STEM W/O DYE: CPT

## 2021-11-17 PROCEDURE — 74011250637 HC RX REV CODE- 250/637: Performed by: PSYCHIATRY & NEUROLOGY

## 2021-11-17 PROCEDURE — C8929 TTE W OR WO FOL WCON,DOPPLER: HCPCS

## 2021-11-17 PROCEDURE — 83735 ASSAY OF MAGNESIUM: CPT

## 2021-11-17 PROCEDURE — 74011250637 HC RX REV CODE- 250/637: Performed by: EMERGENCY MEDICINE

## 2021-11-17 PROCEDURE — 74011250636 HC RX REV CODE- 250/636: Performed by: STUDENT IN AN ORGANIZED HEALTH CARE EDUCATION/TRAINING PROGRAM

## 2021-11-17 RX ORDER — METOPROLOL TARTRATE 25 MG/1
25 TABLET, FILM COATED ORAL 2 TIMES DAILY
Qty: 60 TABLET | Refills: 0 | Status: SHIPPED | OUTPATIENT
Start: 2021-11-17 | End: 2021-11-30 | Stop reason: SDUPTHER

## 2021-11-17 RX ORDER — METOPROLOL TARTRATE 25 MG/1
25 TABLET, FILM COATED ORAL 2 TIMES DAILY
Status: DISCONTINUED | OUTPATIENT
Start: 2021-11-17 | End: 2021-11-17 | Stop reason: HOSPADM

## 2021-11-17 RX ORDER — CLOPIDOGREL BISULFATE 75 MG/1
75 TABLET ORAL DAILY
Qty: 21 TABLET | Refills: 0 | Status: SHIPPED | OUTPATIENT
Start: 2021-11-18 | End: 2021-12-09

## 2021-11-17 RX ADMIN — METOPROLOL TARTRATE 25 MG: 25 TABLET, FILM COATED ORAL at 09:00

## 2021-11-17 RX ADMIN — CLOPIDOGREL BISULFATE 75 MG: 75 TABLET ORAL at 09:00

## 2021-11-17 RX ADMIN — INSULIN LISPRO 3 UNITS: 100 INJECTION, SOLUTION INTRAVENOUS; SUBCUTANEOUS at 12:30

## 2021-11-17 RX ADMIN — GLIPIZIDE 10 MG: 5 TABLET ORAL at 08:59

## 2021-11-17 RX ADMIN — ASPIRIN 81 MG: 81 TABLET, COATED ORAL at 09:00

## 2021-11-17 RX ADMIN — FENOFIBRATE 160 MG: 160 TABLET ORAL at 09:00

## 2021-11-17 RX ADMIN — PANTOPRAZOLE SODIUM 40 MG: 40 TABLET, DELAYED RELEASE ORAL at 08:59

## 2021-11-17 RX ADMIN — ENOXAPARIN SODIUM 40 MG: 100 INJECTION SUBCUTANEOUS at 08:58

## 2021-11-17 RX ADMIN — INSULIN LISPRO 3 UNITS: 100 INJECTION, SOLUTION INTRAVENOUS; SUBCUTANEOUS at 08:58

## 2021-11-17 RX ADMIN — LEVOTHYROXINE SODIUM 225 MCG: 0.12 TABLET ORAL at 08:59

## 2021-11-17 RX ADMIN — Medication 5 ML: at 06:00

## 2021-11-17 RX ADMIN — ALOGLIPTIN 25 MG: 6.25 TABLET, FILM COATED ORAL at 08:59

## 2021-11-17 RX ADMIN — PERFLUTREN 1 ML: 6.52 INJECTION, SUSPENSION INTRAVENOUS at 12:17

## 2021-11-17 NOTE — DISCHARGE SUMMARY
Hospitalist Discharge Summary   Admit Date:  11/15/2021  7:34 PM   DC Note date: 2021  Name:  Eliza Mahmood   Age:  49 y.o. 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 ICD-10-CM: I25.812  ICD-9-CM: 414.07  11/15/2021 - Present Yes        Other hyperlipidemia ICD-10-CM: E78.49  ICD-9-CM: 272.4  11/15/2021 - Present Yes        Hypertension ICD-10-CM: I10  ICD-9-CM: 401.9  Unknown - Present Yes    Overview Signed 10/11/2021 10:41 AM by Cheryl Kilgore MD     medication             Type 2 diabetes mellitus, with long-term current use of insulin ([facility] [address] ICD-10-CM: E11.9, Z79.4  ICD-9-CM: 250.00, V58.67  Unknown - Present Yes    Overview Signed 10/11/2021 10:42 AM by Cheryl Kilgore MD     type 2, oral and insulin, FBS 180s, denies s&s of hypoglycemia             Morbid obesity ([facility] [address] ICD-10-CM: E66.01  ICD-9-CM: 278.01  3/23/2021 - Present Yes            Did Patient have Sepsis (YES OR NO): NO    Hospital Course:  HPI: \"Roberto Isaacs is a 49 y.o. male with medical history of hypertension, hyperlipidemia, type 2 diabetes on long-term insulin, coronary artery disease status post 5 vessel CABG in , who presented with complaint of blurry vision starting at 12 noon today without any other associated symptoms including loss of visual fields, focal weakness, focal numbness.   He also denies fever, chills, chest pain, shortness of breath, diaphoresis, nausea, vomiting, abdominal pain, diarrhea, constipation. \"    Patient was admitted for TIA work-up. He was outside TPA window on admission with NIH also of 0. He was seen by telemetry neurology on admission with CTA/CTP being negative. He was started on aspirin and Plavix load, statin. He had carotid duplex ultrasound which showed no hemodynamically significant stenosis. He had brain MRI which showed no e/o acute infarction. He had ECHO which showed normal EF and mildly dilated LA atrium, no PFO identified. Neurologst Genaro huerta saw patient and recommmended DAPT for 21 days with plavix and to continue high intensity statin. He was given permissive HTN for 24 hours, started on home Lopressor twice daily. Blood pressure was somewhat elevated during short hospitalization and has home Lopressor was increased to 25 mg twice daily. I advised him to follow-up with his PCP for further evaluation and management of his hypertension. Disposition: Home or Self Care  Diet: ADULT DIET Regular; 3 carb choices (45 gm/meal); Low Fat/Low Chol/High Fiber/CLOVIS  Code Status: Full Code    Follow Up Orders: Follow-up Appointments   Procedures    FOLLOW UP VISIT Appointment in: 3 - 5 Days     Standing Status:   Standing     Number of Occurrences:   1     Order Specific Question:   Appointment in     Answer:   3 - 5 Days    FOLLOW UP VISIT Appointment in: Other (Specify) Follow up with Dr. Renae Walter or Darian Booth NP in [facility] neurology clinic [facility] 009     Follow up with Dr. Renae Walter or Darian Booth NP in [facility] neurology [facility] 104     Standing Status:   Standing     Number of Occurrences:   1     Order Specific Question:   Appointment in     Answer:    Other (Specify)       Follow-up Information     Follow up With Specialties Details Why Contact Info    Caitie Allison MD Family Medicine In 1 week  [address] [facility] [id number] [phone]      Renny Quezada DO Neurology In 1 month  [address] [facility]            Follow up labs/diagnostics (ultimately defer to outpatient provider):  Blood pressure evaluation    Time spent in patient discharge and coordination 36 minutes. Plan was discussed with patient and wife. All questions answered. Patient was stable at time of discharge. Instructions given to call a physician or return if any concerns. Discharge Info:   Current Discharge Medication List      START taking these medications    Details   clopidogreL (PLAVIX) 75 mg tab Take 1 Tablet by mouth daily for 21 days. Qty: 21 Tablet, Refills: 0  Start date: 11/18/2021, End date: 12/9/2021         CONTINUE these medications which have CHANGED    Details   metoprolol tartrate (LOPRESSOR) 25 mg tablet Take 1 Tablet by mouth two (2) times a day for 30 days. Qty: 60 Tablet, Refills: 0  Start date: 11/17/2021, End date: 12/17/2021         CONTINUE these medications which have NOT CHANGED    Details   cyclobenzaprine (FLEXERIL) 10 mg tablet Take 1 Tab by mouth three (3) times daily as needed for Muscle Spasm(s). Indications: muscle spasm  Qty: 30 Tab, Refills: 1    Associated Diagnoses: Spondylolisthesis at L5-S1 level; HNP (herniated nucleus pulposus), lumbar      atorvastatin (LIPITOR) 80 mg tablet TAKE 1 TABLET (80 MG) BY MOUTH NIGHTLY      esomeprazole (NEXIUM) 40 mg capsule 40 mg two (2) times a day. fenofibrate (LOFIBRA) 160 mg tablet TAKE 1 TABLET BY MOUTH EVERY DAY      glipiZIDE (GLUCOTROL) 10 mg tablet TAKE 1 TABLET BY MOUTH TWICE A DAY      !! levothyroxine (SYNTHROID) 200 mcg tablet 200 mcg Daily (before breakfast). Pt takes 225 mcg daily (200 mcg tab and 25 mcg tab)      !! levothyroxine (SYNTHROID) 25 mcg tablet 25 mcg Daily (before breakfast). Pt takes 225 mcg daily (200 mcg tab and 25 mcg tab)      aspirin delayed-release 81 mg tablet Take  by mouth. dapagliflozin (Farxiga) 10 mg tab tablet Take 10 mg by mouth daily.       cetirizine (ZYRTEC) 10 mg tablet Take 10 mg by mouth as needed. acetaminophen (TYLENOL) 500 mg tablet Take 500 mg by mouth as needed. Januvia 100 mg tablet every evening. insulin aspart protamine/insulin aspart (NovoLOG Mix 70-30 U-100 Insuln) 100 unit/mL (70-30) injection 30-40 Units by SubCUTAneous route Before breakfast, lunch, and dinner. Sliding scale       !! - Potential duplicate medications found. Please discuss with provider. Procedures done this admission:  * No surgery found *    Consults this admission:  IP CONSULT TO NEUROLOGY  IP CONSULT TO PHYSIATRIST(REHAB MEDICINE)  IP CONSULT TO NEUROLOGY    Echocardiogram/EKG results:  Results from Hospital Encounter encounter on 11/15/21    ECHO ADULT COMPLETE    Interpretation Summary  · Contrast used: DEFINITY. · Image quality for this study was poor. · LV: Estimated LVEF is 55 - 60%. Normal cavity size, wall thickness, systolic function (ejection fraction normal) and diastolic function. · LA: Mildly dilated left atrium. · IAS: Agitated saline contrast study was performed. There was no shunting with Valsalva. · MV: Mild mitral valve regurgitation is present.        EKG Results     Procedure 720 Value Units Date/Time    EKG, 12 LEAD, INITIAL [[id number]] Collected: 11/15/21 1954    Order Status: Completed Updated: 11/16/21 1728     Ventricular Rate 74 BPM      Atrial Rate 82 BPM      P-R Interval 190 ms      QRS Duration 80 ms      Q-T Interval 396 ms      QTC Calculation (Bezet) 439 ms      Calculated P Axis 147 degrees      Calculated R Axis 54 degrees      Calculated T Axis 83 degrees      Diagnosis --     !! AGE AND GENDER SPECIFIC ECG ANALYSIS !!  Unusual P axis, possible ectopic atrial rhythm  Low voltage QRS  Septal infarct , age undetermined  Abnormal ECG  No previous ECGs available  Confirmed by Frederic Huang MD (), ELAINE MONCADA ([id number]) on 11/16/2021 5:27:33 PM            Diagnostic Imaging/Tests:   CT PERF W CBF    Result Date: 11/15/2021  CT Perfusion Imaging INDICATION:  TIA CT perfusion imaging of the brain was performed after the administration of intravenous contrast.  Perfusion maps and perfusion analysis output were generated using the vis ai perfusion processing software algorithm. Radiation dose reduction techniques were used for this study: All CT scans performed at this facility use one or all of the following: Automated exposure control, adjustment of the mA and/or kVp according to patient's size, iterative reconstruction. FINDINGS: CBF < 30% volume:  0 ml   (core infarction volume greater than 50 cc associated with poor outcomes) Tmax > 6 seconds: 0 ml Tmax/CBF Mismatch Volume: 0 ml Tmax/CBF Mismatch Ratio: N/A Hypoperfusion Intensity Ratio: 0   (values greater than 0.5 associated with poor outcome) Tmax > 10 seconds Volume: 0 ml   (volume greater than 100 mL is associated with poor outcome)     1. No evidence of core infarct or ischemic penumbra. CTA CODE NEURO HEAD AND NECK W CONT    Result Date: 11/16/2021  History: Blurry vision starting at 12 noon today Comments: CT ANGIOGRAM OF THE NECK AND CT ANGIOGRAM OF THE [place] [facility] was obtained following the administration of IV contrast. IV contrast was administered to evaluate the arterial vasculature. Reformatted images in the coronal and sagittal planes as well as 3-D imaging was obtained and reviewed on a dedicated PACS and [address] Radiation reduction dose techniques were used for the study. Our CT scanner use one or all of the following- Automated exposure control, adjustment of the mA and/or KV according the patient size, iterative reconstruction. All measurements are based upon NASCET criteria if appropriate. This study was analyzed by the [address] [place] [address].Algorithm Findings: CT ANGIOGRAM OF THE NECK: The arch and proximal great vessels are patent. The carotid bulbs are patent with a mild beaded appearance at the right carotid bulb with associated mild stenosis.  Degree of stenosis is less than 50%. The proximal vertebral arteries are patent. Multiple metallic objects are noted within the neck or possibly surgical clips from thyroidectomy. The lung apices are clear. CT ANGIOGRAM OF [place] [facility]: The petrous, cavernous, and supraclinoid internal carotid arteries are patent. The anterior and middle cerebral arteries are patent The distal vertebral arteries are patent with mild atherosclerotic changes of the left. The left is diminutive in size. The basilar artery and posterior cerebral arteries are patent. The posterior inferior cerebellar arteries and superior cerebellar arteries are patent. The anterior-inferior cerebellar arteries are too small to evaluate. Due to contrast bolus timing technique difficult to fully evaluate the dural venous sinuses however grossly they appear patent. 1. Mild narrowing of the right carotid bulb/origin of the right internal carotid artery. CT CODE NEURO HEAD WO CONTRAST    Result Date: 11/15/2021  CT of the head without contrast. CLINICAL INDICATION: Headache for three days with double vision; code stroke PROCEDURE: Serial thin section axial images are obtained from the cranial vertex through the skull base without the administration of intravenous contrast. Radiation dose reduction techniques were used for this study. Our CT scanners use one or all of the following: Automated exposure control, adjusted of the mA and/or kV according to patient size, iterative reconstruction COMPARISON: No prior. FINDINGS: There is no acute intracranial hemorrhage, mass, or mass effect. No abnormal extra-axial fluid collections identified. There is no hydrocephalus. The basilar cisterns are widely patent. The gray-white matter brain parenchymal interface is well-maintained. No skull fracture or aggressive osseous lesion noted. The mastoid air cells and included paranasal sinuses are clear. No acute intracranial abnormality.      DUPLEX CAROTID BILATERAL    Result Date: 11/16/2021  Examination: Carotid ultrasound. INDICATION: TIA TECHNIQUE: Color and power Doppler imaging as well as spectral analysis of the bilateral extracranial carotid systems were performed. FINDINGS: Mild atheromatous plaquing at the carotid bulbs bilaterally. There is no hemodynamically significant stenosis. The peak systolic velocities are within normal limits. The peak systolic velocity in the right internal carotid artery is 66 cm/s with ICA to CCA ratio of 1.0. The peak systolic velocity in the left internal carotid artery is 51 cm/s with ICA to CCA ratio of 0.6. The vertebral arteries have antegrade flow. 1. No hemodynamically significant stenosis. All Micro Results     Procedure Component Value Units Date/Time    COVID-19 RAPID TEST [[id number]] Collected: 11/15/21 2222    Order Status: Completed Specimen: Nasopharyngeal Updated: 11/15/21 2259     Specimen source NP Swab        COVID-19 rapid test Not detected        Comment:      The specimen is NEGATIVE for SARS-CoV-2, the novel coronavirus associated with COVID-19. A negative result does not rule out COVID-19. This test has been authorized by the FDA under an Emergency Use Authorization (EUA) for use by authorized laboratories.         Fact sheet for Healthcare Providers: ConventionUpdate.co.nz  Fact sheet for Patients: ConventionUpdate.co.nz       Methodology: Isothermal Nucleic Acid Amplification               Labs: Results:       BMP, Mg, Phos Recent Labs     11/17/21  0805 11/16/21  0525 11/15/21  1937   * 139 140   K 4.0 3.7 3.4*    107 107   CO2 25 26 24   AGAP 4* 6* 9   BUN 18 15 17   CREA 0.88 0.82 1.20   CA 8.9 8.2* 9.3   * 113* 105*   MG 2.3  --   --       CBC Recent Labs     11/17/21  0805 11/16/21  0525 11/15/21  1937   WBC 5.0 6.4 7.5   RBC 6.10* 5.66* 6.02*   HGB 16.0 14.8 15.9   HCT 50.3 46.6 49.7    235 259   GRANS  --   --  53   LYMPH  --   --  34 EOS  --   --  3   MONOS  --   --  9   BASOS  --   --  1   IG  --   --  0   ANEU  --   --  3.9   ABL  --   --  2.6   BALTAZAR  --   --  0.2   ABM  --   --  0.7   ABB  --   --  0.1   AIG  --   --  0.0      LFT Recent Labs     11/17/21  0805   ALT 28   AP 59   TP 7.3   ALB 3.8   GLOB 3.5   AGRAT 1.1*      Cardiac Testing Lab Results   Component Value Date/Time     11/15/2021 07:37 PM    CK - MB 3.8 (H) 11/15/2021 07:37 PM    CK-MB Index 2.5 11/15/2021 07:37 PM      Coagulation Tests Lab Results   Component Value Date/Time    INR (POC) 1.2 11/15/2021 08:35 PM      A1c Lab Results   Component Value Date/Time    Hemoglobin A1c 9.2 (H) 11/16/2021 05:25 AM    Hemoglobin A1c 9.9 (H) 10/11/2021 11:03 AM    Hemoglobin A1c 9.1 (H) 05/05/2021 01:26 PM      Lipid Panel Lab Results   Component Value Date/Time    Cholesterol, total 146 11/16/2021 05:25 AM    HDL Cholesterol 36 (L) 11/16/2021 05:25 AM    LDL, calculated 72.6 11/16/2021 05:25 AM    VLDL, calculated 37.4 (H) 11/16/2021 05:25 AM    Triglyceride 187 (H) 11/16/2021 05:25 AM    CHOL/HDL Ratio 4.1 11/16/2021 05:25 AM      Thyroid Panel Lab Results   Component Value Date/Time    TSH 5.980 (H) 10/11/2021 11:03 AM        Most Recent UA Lab Results   Component Value Date/Time    Color YELLOW 05/05/2021 01:26 PM    Appearance TURBID 05/05/2021 01:26 PM    pH (UA) 6.0 05/05/2021 01:26 PM    Protein Negative 05/05/2021 01:26 PM    Glucose >1,000 05/05/2021 01:26 PM    Ketone Negative 05/05/2021 01:26 PM    Bilirubin Negative 05/05/2021 01:26 PM    Blood Negative 05/05/2021 01:26 PM    Urobilinogen 1.0 05/05/2021 01:26 PM    Nitrites Negative 05/05/2021 01:26 PM    Leukocyte Esterase Negative 05/05/2021 01:26 PM    WBC 0-3 05/05/2021 01:26 PM    RBC 0-3 05/05/2021 01:26 PM    Epithelial cells 0 05/05/2021 01:26 PM    Bacteria 0 05/05/2021 01:26 PM    Casts 0 05/05/2021 01:26 PM          All Labs from Last 24 Hrs:  Recent Results (from the past 24 hour(s))   GLUCOSE, POC Collection Time: 11/16/21  4:34 PM   Result Value Ref Range    Glucose (POC) 178 (H) 65 - 100 mg/dL    Performed by Georgiana GLUCOSE, POC    Collection Time: 11/16/21  9:49 PM   Result Value Ref Range    Glucose (POC) 197 (H) 65 - 100 mg/dL    Performed by Yong    GLUCOSE, POC    Collection Time: 11/17/21  8:00 AM   Result Value Ref Range    Glucose (POC) 165 (H) 65 - 100 mg/dL    Performed by Dom    CBC W/O DIFF    Collection Time: 11/17/21  8:05 AM   Result Value Ref Range    WBC 5.0 4.3 - 11.1 K/uL    RBC 6.10 (H) 4.23 - 5.6 M/uL    HGB 16.0 13.6 - 17.2 g/dL    HCT 50.3 41.1 - 50.3 %    MCV 82.5 79.6 - 97.8 FL    MCH 26.2 26.1 - 32.9 PG    MCHC 31.8 31.4 - 35.0 g/dL    RDW 14.1 11.9 - 14.6 %    PLATELET 113 086 - 871 K/uL    MPV 10.8 9.4 - 12.3 FL    ABSOLUTE NRBC 0.00 0.0 - 0.2 K/uL   METABOLIC PANEL, COMPREHENSIVE    Collection Time: 11/17/21  8:05 AM   Result Value Ref Range    Sodium 135 (L) 136 - 145 mmol/L    Potassium 4.0 3.5 - 5.1 mmol/L    Chloride 106 98 - 107 mmol/L    CO2 25 21 - 32 mmol/L    Anion gap 4 (L) 7 - 16 mmol/L    Glucose 182 (H) 65 - 100 mg/dL    BUN 18 6 - 23 MG/DL    Creatinine 0.88 0.8 - 1.5 MG/DL    GFR est AA >60 >60 ml/min/1.73m2    GFR est non-AA >60 >60 ml/min/1.73m2    Calcium 8.9 8.3 - 10.4 MG/DL    Bilirubin, total 1.4 (H) 0.2 - 1.1 MG/DL    ALT (SGPT) 28 12 - 65 U/L    AST (SGOT) 25 15 - 37 U/L    Alk.  phosphatase 59 50 - 136 U/L    Protein, total 7.3 6.3 - 8.2 g/dL    Albumin 3.8 3.5 - 5.0 g/dL    Globulin 3.5 2.3 - 3.5 g/dL    A-G Ratio 1.1 (L) 1.2 - 3.5     MAGNESIUM    Collection Time: 11/17/21  8:05 AM   Result Value Ref Range    Magnesium 2.3 1.8 - 2.4 mg/dL   ECHO ADULT COMPLETE    Collection Time: 11/17/21 10:00 AM   Result Value Ref Range    LV ED Vol A2C 77.00 cm3    LV ED Vol A4C 100.00 cm3    LV ES Vol A2C 30.00 cm3    LV ES Vol A4C 42.00 cm3    IVSd 1.12 (A) 0.6 - 1.0 cm    LVIDd 4.79 4.2 - 5.9 cm    LVIDs 3.61 cm    LVOT d 2.20 cm    LVOT VTI 21.80 cm    LVOT Peak Gradient 5.00 mmHg    LVPWd 0.96 0.6 - 1.0 cm    LV E' Lateral Velocity 11.60 cm/s    LV E' Septal Velocity 10.50 cm/s    Left Atrium Minor Axis 2.60 cm    Left Atrium Major Axis 6.70 cm    LA Area 2C 23.20 cm2    LA Area 4C 22.70 cm2    Aortic Valve Systolic Mean Gradient 2.02 mmHg    AoV VTI 24.90 cm    Aortic Valve Systolic Peak Velocity 588.27 cm/s    AoV PG 7.00 mmHg    Aortic Valve Area by Continuity of VTI 3.33 cm2    Aortic Valve Area by Continuity of Peak Velocity 3.05 cm2    Ao Root D 3.10 cm    AO ASC D 3.50 cm    Mitral Valve E Wave Deceleration Time 342.00 ms    MV A Kendrick 63.20 cm/s    MV E Kendrick 66.10 cm/s    RVIDd 3.70 cm    MV E/A 1.05     LV Mass .9 88 - 224 g    LV Mass AL Index 69.4 49 - 115 g/m2    E/E' lateral 5.70     E/E' septal 6.30     E/E' ratio (averaged) 6.00     TREMAINE/BSA Pk Kendrick 1.2 cm2/m2    TREMAINE/BSA VTI 1.3 cm2/m2   GLUCOSE, POC    Collection Time: 11/17/21 11:51 AM   Result Value Ref Range    Glucose (POC) 196 (H) 65 - 100 mg/dL    Performed by Henok.         Current Med List in Hospital:   Current Facility-Administered Medications   Medication Dose Route Frequency    metoprolol tartrate (LOPRESSOR) tablet 25 mg  25 mg Oral BID    aspirin delayed-release tablet 81 mg  81 mg Oral DAILY    atorvastatin (LIPITOR) tablet 80 mg  80 mg Oral QHS    cetirizine (ZYRTEC) tablet 10 mg  10 mg Oral DAILY PRN    cyclobenzaprine (FLEXERIL) tablet 10 mg  10 mg Oral TID PRN    dapagliflozin (FARXIGA) tablet 10 mg (Patient Supplied)  10 mg Oral DAILY    pantoprazole (PROTONIX) tablet 40 mg  40 mg Oral ACB&D    fenofibrate (LOFIBRA) tablet 160 mg  160 mg Oral DAILY    glipiZIDE (GLUCOTROL) tablet 10 mg  10 mg Oral ACB&D    alogliptin (NESINA) tablet 25 mg  25 mg Oral DAILY    sodium chloride (NS) flush 5-40 mL  5-40 mL IntraVENous Q8H    sodium chloride (NS) flush 5-40 mL  5-40 mL IntraVENous PRN    ondansetron (ZOFRAN) injection 4 mg  4 mg IntraVENous Q6H PRN    enoxaparin (LOVENOX) injection 40 mg  40 mg SubCUTAneous Q12H    insulin lispro (HUMALOG) injection   SubCUTAneous AC&HS    levothyroxine (SYNTHROID) tablet 225 mcg  225 mcg Oral ACB    clopidogreL (PLAVIX) tablet 75 mg  75 mg Oral DAILY    acetaminophen (TYLENOL) tablet 650 mg  650 mg Oral Q4H PRN       No Known Allergies    There is no immunization history on file for this patient. Recent Vital Data:  Patient Vitals for the past 24 hrs:   Temp Pulse Resp BP SpO2   11/17/21 1216    (!) 154/69    11/17/21 1137 97.4 °F (36.3 °C) 73 20 136/79 95 %   11/17/21 0800 98 °F (36.7 °C) 68 20 (!) 142/86 99 %   11/17/21 0400  80      11/17/21 0235 98.4 °F (36.9 °C) 80 19 (!) 154/69 98 %   11/17/21 0000  64      11/16/21 2325 98 °F (36.7 °C) 68 18 137/81 96 %   11/16/21 2000  68      11/16/21 1950 97.5 °F (36.4 °C) 74 19 (!) 145/87 96 %   11/16/21 1523 98.6 °F (37 °C) 79 20 125/82 97 %     Oxygen Therapy  O2 Sat (%): 95 % (11/17/21 1137)  Pulse via Oximetry: 76 beats per minute (11/15/21 2156)  O2 Device: None (Room air) (11/17/21 1308)    Estimated body mass index is 42.59 kg/m² as calculated from the following:    Height as of this encounter: 6' (1.829 m). Weight as of this encounter: 142.4 kg (314 lb). Intake/Output Summary (Last 24 hours) at 11/17/2021 1520  Last data filed at 11/17/2021 1137  Gross per 24 hour   Intake 240 ml   Output 3250 ml   Net -3010 ml         Physical Exam:    General:    Well nourished. No overt distress  Head:  Normocephalic, atraumatic  Eyes:  Sclerae appear normal.  Pupils equally round. HENT:  Nares appear normal, no drainage. Moist mucous membranes  Neck:  No restricted ROM. Trachea midline  CV:   RRR. No m/r/g. No JVD  Lungs:   CTAB. No wheezing, rhonchi, or rales. Even, unlabored  Abdomen:   Soft, nontender, nondistended. Extremities: Warm and dry. No cyanosis or clubbing. No edema. Skin:     No rashes.   Normal coloration  Neuro:  CN II-XII grossly intact. Psych:  Normal mood and affect. Signed:  Ashia Reyez MD    Part of this note may have been written by using a voice dictation software. The note has been proof read but may still contain some grammatical/other typographical errors. Alta Rogers

## 2021-11-17 NOTE — PROGRESS NOTES
Hospitalist Progress Note   Admit Date:  11/15/2021  7:34 PM   Name:  Chris Hsu   Age:  49 y.o. Sex:  male  :  1965   MRN:  [id number]   Room:  365    Presenting Complaint: Headache    Reason(s) for Admission: TIA (transient ischemic attack) [G45.9]     Hospital Course & Interval History:   Chris Hsu is a 49 y.o. male with medical history of hypertension, hyperlipidemia, type 2 diabetes on long-term insulin, coronary artery disease status post 5 vessel CABG in , who presented with complaint of blurry vision starting at 12 noon today without any other associated symptoms including loss of visual fields, focal weakness, focal numbness. He also denies fever, chills, chest pain, shortness of breath, diaphoresis, nausea, vomiting, abdominal pain, diarrhea, constipation. Subjective (21): Patient was seen and examined at the bedside. Patient without any neurologic deficit. Patient not complaining of any blurry vision this morning. Does have occasional headache. Patient denied any cardiac chest pain, shortness of breath, abdominal pain.     Assessment & Plan:    TIA (transient ischemic attack) (11/15/2021)  Patient presented with blurry vision but without any neurologic deficit  Patient was out of window for TPA  NIH score of 0  Seen by telemetry neurology who recommended MRI  MRI pending, patient likely to get MRI done  in the morning  Start aspirin, statin  Loaded with Plavix 300 mg and continue 75 mg daily for 21 days  Neuro checks every 4 hours  Depression screening at discharge  Echo pending  Carotid ultrasound, no hemodynamically significant stenosis  CTA, mild narrowing of right carotid bulb/origin of the right internal carotid artery    Hypertension  Continue on home metoprolol  Can allow permissive hypertension for next 24 hours    Type 2 diabetes  Sliding scale insulin  Continue to monitor blood glucose daily    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 Dispo/Discharge Planning:    Dispo pending clinical course. Patient likely to be discharged next 24 hours pending MRI and echo    Diet:  ADULT DIET Regular; 3 carb choices (45 gm/meal);  Low Fat/Low Chol/High Fiber/CLOVIS  DVT PPx: Lovenox  Code status: Full Code    Hospital Problems as of 11/16/2021 Date Reviewed: 11/15/2021          Codes Class Noted - Resolved POA    * (Principal) TIA (transient ischemic attack) ICD-10-CM: G45.9  ICD-9-CM: 435.9  11/15/2021 - Present Yes        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 ICD-10-CM: I25.812  ICD-9-CM: 414.07  11/15/2021 - Present Yes        Other hyperlipidemia ICD-10-CM: E78.49  ICD-9-CM: 272.4  11/15/2021 - Present Yes        Hypertension ICD-10-CM: I10  ICD-9-CM: 401.9  Unknown - Present Yes    Overview Signed 10/11/2021 10:41 AM by Mehreen Lujan MD     medication             Type 2 diabetes mellitus, with long-term current use of insulin ([facility] ICD-10-CM: E11.9, Z79.4  ICD-9-CM: 250.00, V58.67  Unknown - Present Yes    Overview Signed 10/11/2021 10:42 AM by Mehreen Lujan MD     type 2, oral and insulin, FBS 180s, denies s&s of hypoglycemia             Morbid obesity ([facility] ICD-10-CM: E66.01  ICD-9-CM: 278.01  3/23/2021 - Present Yes              Objective:     Patient Vitals for the past 24 hrs:   Temp Pulse Resp BP SpO2   11/16/21 1523 98.6 °F (37 °C) 79 20 125/82 97 %   11/16/21 1220 98.5 °F (36.9 °C) 73 18 (!) 143/83 95 %   11/16/21 0807 98.3 °F (36.8 °C) 79 20 (!) 147/87 95 %   11/16/21 0414 98.8 °F (37.1 °C) 70 16 123/64 97 %   11/16/21 0007 98.6 °F (37 °C) 73 16 (!) 151/86 95 %   11/15/21 2156  75 16 132/62 96 %     Oxygen Therapy  O2 Sat (%): 97 % (11/16/21 1523)  Pulse via Oximetry: 76 beats per minute (11/15/21 2156)  O2 Device: None (Room air) (11/15/21 1927)    Estimated body mass index is 42.65 kg/m² as calculated from the following:    Height as of this encounter: 6' (1.829 m). Weight as of this encounter: 142.7 kg (314 lb 8 oz). Intake/Output Summary (Last 24 hours) at 11/16/2021 1936  Last data filed at 11/16/2021 1523  Gross per 24 hour   Intake 840 ml   Output 1900 ml   Net -1060 ml         Physical Exam:   Blood pressure 125/82, pulse 79, temperature 98.6 °F (37 °C), resp. rate 20, height 6' (1.829 m), weight 142.7 kg (314 lb 8 oz), SpO2 97 %. General:    Well nourished. No overt distress  Head:  Normocephalic, atraumatic  Eyes:  Sclerae appear normal.  Pupils equally round. ENT:  Nares appear normal, no drainage. Moist oral mucosa  Neck:  No restricted ROM. Trachea midline   CV:   RRR. No m/r/g. No jugular venous distension. Lungs:   CTAB. No wheezing, rhonchi, or rales. Respirations even, unlabored  Abdomen: Bowel sounds present. Soft, nontender, nondistended. Extremities: No cyanosis or clubbing. No edema  Skin:     No rashes and normal coloration. Warm and dry. Neuro:  CN II-XII grossly intact. Sensation intact. A&Ox3  Psych:  Normal mood and affect.       I have reviewed ordered lab tests and independently visualized imaging below:    Recent Labs:  Recent Results (from the past 48 hour(s))   GLUCOSE, POC    Collection Time: 11/15/21  7:34 PM   Result Value Ref Range    Glucose (POC) 100 65 - 100 mg/dL    Performed by [facility] Sleeper    METABOLIC PANEL, BASIC    Collection Time: 11/15/21  7:37 PM   Result Value Ref Range    Sodium 140 136 - 145 mmol/L    Potassium 3.4 (L) 3.5 - 5.1 mmol/L    Chloride 107 98 - 107 mmol/L    CO2 24 21 - 32 mmol/L    Anion gap 9 7 - 16 mmol/L    Glucose 105 (H) 65 - 100 mg/dL    BUN 17 6 - 23 MG/DL    Creatinine 1.20 0.8 - 1.5 MG/DL    GFR est AA >60 >60 ml/min/1.73m2    GFR est non-AA >60 >60 ml/min/1.73m2 Calcium 9.3 8.3 - 10.4 MG/DL   CBC WITH AUTOMATED DIFF    Collection Time: 11/15/21  7:37 PM   Result Value Ref Range    WBC 7.5 4.3 - 11.1 K/uL    RBC 6.02 (H) 4.23 - 5.6 M/uL    HGB 15.9 13.6 - 17.2 g/dL    HCT 49.7 41.1 - 50.3 %    MCV 82.6 79.6 - 97.8 FL    MCH 26.4 26.1 - 32.9 PG    MCHC 32.0 31.4 - 35.0 g/dL    RDW 14.1 11.9 - 14.6 %    PLATELET 842 525 - 120 K/uL    MPV 11.1 9.4 - 12.3 FL    ABSOLUTE NRBC 0.00 0.0 - 0.2 K/uL    NEUTROPHILS 53 43 - 78 %    LYMPHOCYTES 34 13 - 44 %    MONOCYTES 9 4.0 - 12.0 %    EOSINOPHILS 3 0.5 - 7.8 %    BASOPHILS 1 0.0 - 2.0 %    IMMATURE GRANULOCYTES 0 0.0 - 5.0 %    ABS. NEUTROPHILS 3.9 1.7 - 8.2 K/UL    ABS. LYMPHOCYTES 2.6 0.5 - 4.6 K/UL    ABS. MONOCYTES 0.7 0.1 - 1.3 K/UL    ABS. EOSINOPHILS 0.2 0.0 - 0.8 K/UL    ABS. BASOPHILS 0.1 0.0 - 0.2 K/UL    ABS. IMM.  GRANS. 0.0 0.0 - 0.5 K/UL    DF AUTOMATED     CK WITH MB    Collection Time: 11/15/21  7:37 PM   Result Value Ref Range    CK - MB 3.8 (H) 1.5 - 3.6 ng/ml    CK-MB Index 2.5 %     21 - 215 U/L   TROPONIN-HIGH SENSITIVITY    Collection Time: 11/15/21  7:37 PM   Result Value Ref Range    Troponin-High Sensitivity 9.1 0 - 14 pg/mL   EKG, 12 LEAD, INITIAL    Collection Time: 11/15/21  7:54 PM   Result Value Ref Range    Ventricular Rate 74 BPM    Atrial Rate 82 BPM    P-R Interval 190 ms    QRS Duration 80 ms    Q-T Interval 396 ms    QTC Calculation (Bezet) 439 ms    Calculated P Axis 147 degrees    Calculated R Axis 54 degrees    Calculated T Axis 83 degrees    Diagnosis       !! AGE AND GENDER SPECIFIC ECG ANALYSIS !!  Unusual P axis, possible ectopic atrial rhythm  Low voltage QRS  Septal infarct , age undetermined  Abnormal ECG  No previous ECGs available  Confirmed by Gal Morales MD (), ELAINE MONCADA () on 11/16/2021 5:27:33 PM     POC PT/INR    Collection Time: 11/15/21  8:35 PM   Result Value Ref Range    Prothrombin time (POC) 13.7 (H) 9.6 - 11.6 SECS    INR (POC) 1.2 0.9 - 1.2     DRUG SCREEN, URINE Collection Time: 11/15/21  9:51 PM   Result Value Ref Range    PCP(PHENCYCLIDINE) Negative      BENZODIAZEPINES Negative      COCAINE Negative      AMPHETAMINES Negative      METHADONE Negative      THC (TH-CANNABINOL) Negative      OPIATES Negative      BARBITURATES Negative     COVID-19 RAPID TEST    Collection Time: 11/15/21 10:22 PM   Result Value Ref Range    Specimen source NP Swab      COVID-19 rapid test Not detected NOTD     LIPID PANEL    Collection Time: 11/16/21  5:25 AM   Result Value Ref Range    Cholesterol, total 146 MG/DL    Triglyceride 187 (H) 35 - 150 MG/DL    HDL Cholesterol 36 (L) 40 - 60 MG/DL    LDL, calculated 72.6 <100 MG/DL    VLDL, calculated 37.4 (H) 6.0 - 23.0 MG/DL    CHOL/HDL Ratio 4.1 <200     HEMOGLOBIN A1C WITH EAG    Collection Time: 11/16/21  5:25 AM   Result Value Ref Range    Hemoglobin A1c 9.2 (H) 4.20 - 6.30 %    Est. average glucose 217 mg/dL   CBC W/O DIFF    Collection Time: 11/16/21  5:25 AM   Result Value Ref Range    WBC 6.4 4.3 - 11.1 K/uL    RBC 5.66 (H) 4.23 - 5.6 M/uL    HGB 14.8 13.6 - 17.2 g/dL    HCT 46.6 41.1 - 50.3 %    MCV 82.3 79.6 - 97.8 FL    MCH 26.1 26.1 - 32.9 PG    MCHC 31.8 31.4 - 35.0 g/dL    RDW 14.0 11.9 - 14.6 %    PLATELET 831 891 - 371 K/uL    MPV 11.1 9.4 - 12.3 FL    ABSOLUTE NRBC 0.00 0.0 - 0.2 K/uL   METABOLIC PANEL, BASIC    Collection Time: 11/16/21  5:25 AM   Result Value Ref Range    Sodium 139 136 - 145 mmol/L    Potassium 3.7 3.5 - 5.1 mmol/L    Chloride 107 98 - 107 mmol/L    CO2 26 21 - 32 mmol/L    Anion gap 6 (L) 7 - 16 mmol/L    Glucose 113 (H) 65 - 100 mg/dL    BUN 15 6 - 23 MG/DL    Creatinine 0.82 0.8 - 1.5 MG/DL    GFR est AA >60 >60 ml/min/1.73m2    GFR est non-AA >60 >60 ml/min/1.73m2    Calcium 8.2 (L) 8.3 - 10.4 MG/DL   GLUCOSE, POC    Collection Time: 11/16/21  8:12 AM   Result Value Ref Range    Glucose (POC) 143 (H) 65 - 100 mg/dL    Performed by [facility]ok.     GLUCOSE, POC    Collection Time: 11/16/21 12:26 PM Result Value Ref Range    Glucose (POC) 137 (H) 65 - 100 mg/dL    Performed by Henok. GLUCOSE, POC    Collection Time: 11/16/21  4:34 PM   Result Value Ref Range    Glucose (POC) 178 (H) 65 - 100 mg/dL    Performed by Henok. All Micro Results     Procedure Component Value Units Date/Time    COVID-19 RAPID TEST [[id number]] Collected: 11/15/21 2222    Order Status: Completed Specimen: Nasopharyngeal Updated: 11/15/21 2259     Specimen source NP Swab        COVID-19 rapid test Not detected        Comment:      The specimen is NEGATIVE for SARS-CoV-2, the novel coronavirus associated with COVID-19. A negative result does not rule out COVID-19. This test has been authorized by the FDA under an Emergency Use Authorization (EUA) for use by authorized laboratories. Fact sheet for Healthcare Providers: [employer]  Fact sheet for Patients: [employer]       Methodology: Isothermal Nucleic Acid Amplification               Other Studies:  CT PERF W CBF    Result Date: 11/15/2021  CT Perfusion Imaging INDICATION:  TIA CT perfusion imaging of the brain was performed after the administration of intravenous contrast.  Perfusion maps and perfusion analysis output were generated using the vis [employer] perfusion processing software algorithm. Radiation dose reduction techniques were used for this study: All CT scans performed at this facility use one or all of the following: Automated exposure control, adjustment of the mA and/or kVp according to patient's size, iterative reconstruction.  FINDINGS: CBF < 30% volume:  0 ml   (core infarction volume greater than 50 cc associated with poor outcomes) Tmax > 6 seconds: 0 ml Tmax/CBF Mismatch Volume: 0 ml Tmax/CBF Mismatch Ratio: N/A Hypoperfusion Intensity Ratio: 0   (values greater than 0.5 associated with poor outcome) Tmax > 10 seconds Volume: 0 ml   (volume greater than 100 mL is associated with poor outcome)     1. No evidence of core infarct or ischemic penumbra. CTA CODE NEURO HEAD AND NECK W CONT    Result Date: 11/16/2021  History: Blurry vision starting at 12 noon today Comments: CT ANGIOGRAM OF THE NECK AND CT ANGIOGRAM OF THE [facility] was obtained following the administration of IV contrast. IV contrast was administered to evaluate the arterial vasculature. Reformatted images in the coronal and sagittal planes as well as 3-D imaging was obtained and reviewed on a dedicated PACS and [address]. Radiation reduction dose techniques were used for the study. Our CT scanner use one or all of the following- Automated exposure control, adjustment of the mA and/or KV according the patient size, iterative reconstruction. All measurements are based upon NASCET criteria if appropriate. This study was analyzed by the [address] [place] [address] [address] ai.Algorithm Findings: CT ANGIOGRAM OF THE NECK: The arch and proximal great vessels are patent. The carotid bulbs are patent with a mild beaded appearance at the right carotid bulb with associated mild stenosis. Degree of stenosis is less than 50%. The proximal vertebral arteries are patent. Multiple metallic objects are noted within the neck or possibly surgical clips from thyroidectomy. The lung apices are clear. CT ANGIOGRAM OF [facility]: The petrous, cavernous, and supraclinoid internal carotid arteries are patent. The anterior and middle cerebral arteries are patent The distal vertebral arteries are patent with mild atherosclerotic changes of the left. The left is diminutive in size. The basilar artery and posterior cerebral arteries are patent. The posterior inferior cerebellar arteries and superior cerebellar arteries are patent. The anterior-inferior cerebellar arteries are too small to evaluate. Due to contrast bolus timing technique difficult to fully evaluate the dural venous sinuses however grossly they appear patent.      1. Mild narrowing of the right carotid bulb/origin of the right internal carotid artery. CT CODE NEURO HEAD WO CONTRAST    Result Date: 11/15/2021  CT of the head without contrast. CLINICAL INDICATION: Headache for three days with double vision; code stroke PROCEDURE: Serial thin section axial images are obtained from the cranial vertex through the skull base without the administration of intravenous contrast. Radiation dose reduction techniques were used for this study. Our CT scanners use one or all of the following: Automated exposure control, adjusted of the mA and/or kV according to patient size, iterative reconstruction COMPARISON: No prior. FINDINGS: There is no acute intracranial hemorrhage, mass, or mass effect. No abnormal extra-axial fluid collections identified. There is no hydrocephalus. The basilar cisterns are widely patent. The gray-white matter brain parenchymal interface is well-maintained. No skull fracture or aggressive osseous lesion noted. The mastoid air cells and included paranasal sinuses are clear. No acute intracranial abnormality. DUPLEX CAROTID BILATERAL    Result Date: 11/16/2021  Examination: Carotid ultrasound. INDICATION: TIA TECHNIQUE: Color and power Doppler imaging as well as spectral analysis of the bilateral extracranial carotid systems were performed. FINDINGS: Mild atheromatous plaquing at the carotid bulbs bilaterally. There is no hemodynamically significant stenosis. The peak systolic velocities are within normal limits. The peak systolic velocity in the right internal carotid artery is 66 cm/s with ICA to CCA ratio of 1.0. The peak systolic velocity in the left internal carotid artery is 51 cm/s with ICA to CCA ratio of 0.6. The vertebral arteries have antegrade flow. 1. No hemodynamically significant stenosis.       Current Meds:  Current Facility-Administered Medications   Medication Dose Route Frequency    aspirin delayed-release tablet 81 mg  81 mg Oral DAILY    atorvastatin (LIPITOR) tablet 80 mg  80 mg Oral QHS    cetirizine (ZYRTEC) tablet 10 mg  10 mg Oral DAILY PRN    cyclobenzaprine (FLEXERIL) tablet 10 mg  10 mg Oral TID PRN    dapagliflozin (FARXIGA) tablet 10 mg (Patient Supplied)  10 mg Oral DAILY    pantoprazole (PROTONIX) tablet 40 mg  40 mg Oral ACB&D    fenofibrate (LOFIBRA) tablet 160 mg  160 mg Oral DAILY    glipiZIDE (GLUCOTROL) tablet 10 mg  10 mg Oral ACB&D    alogliptin (NESINA) tablet 25 mg  25 mg Oral DAILY    metoprolol tartrate (LOPRESSOR) tablet 12.5 mg  12.5 mg Oral BID    sodium chloride (NS) flush 5-40 mL  5-40 mL IntraVENous Q8H    sodium chloride (NS) flush 5-40 mL  5-40 mL IntraVENous PRN    ondansetron (ZOFRAN) injection 4 mg  4 mg IntraVENous Q6H PRN    enoxaparin (LOVENOX) injection 40 mg  40 mg SubCUTAneous Q12H    insulin lispro (HUMALOG) injection   SubCUTAneous AC&HS    levothyroxine (SYNTHROID) tablet 225 mcg  225 mcg Oral ACB    [START ON 11/17/2021] clopidogreL (PLAVIX) tablet 75 mg  75 mg Oral DAILY    acetaminophen (TYLENOL) tablet 650 mg  650 mg Oral Q4H PRN       Signed:  Shiv Park MD    Part of this note may have been written by using a voice dictation software. The note has been proof read but may still contain some grammatical/other typographical errors.

## 2021-11-17 NOTE — DISCHARGE INSTRUCTIONS
Please start taking plavix in addition to your aspirin for the next 21 days to treat your possible mini stroke. You also should follow up with your neurologist to determine if your symptoms were more likely related to complex migraine. If you start to have more frequent migraines you should be started on other medication to prevent migraine. You should have your blood pressure controlled to less than 130/80. I have increased your metoprolol for continued treatment of your hypertension and you should follow up with your PCP for further evaluation and management of your hypertension. Stroke: After Your Visit     Your Care Instructions     You have had a stroke. Risk factors for stroke include being overweight, smoking, and sedentary lifestyle. This means that the blood flow to a part of your brain was blocked for some time, which damages the nerve cells in that part of the brain. The part of your body controlled by that part of your brain may not function properly now. The brain is an amazing organ that can heal itself to some degree. The stroke you had damaged part of your brain, but other parts of your brain may take over in some way for the damaged areas. You have already started this process. Going home may be hard for you and your family. The more you can try to do for yourself, the better. Remember to take each day one at a time. Follow-up care is a key part of your treatment and safety. Be sure to make and go to all appointments, and call your doctor if you are having problems. Its also a good idea to know your test results and keep a list of the medicines you take. How can you care for yourself at home? Enter a stroke rehabilitation (rehab) program, if your doctor recommends it. Physical, speech, and occupational therapies can help you manage bathing, dressing, eating, and other basics of daily living. Eat a heart-healthy diet that is low in cholesterol, saturated fat, and salt.  Eat lots of fresh fruits and vegetables and foods high in fiber. Increase your activities slowly. Take short rest breaks when you get tired. Gradually increase the amount you walk. Start out by walking a little more than you did the day before. Do not drive until your doctor says it is okay. It is normal to feel sad or depressed after a stroke. If the blues last, talk to your doctor. If you are having problems with urine leakage, go to the bathroom at regular times, including when you first wake up and at bedtime. Also, limit fluids after dinner. If you are constipated, drink plenty of fluids, enough so that your urine is light yellow or clear like water. If you have kidney, heart, or liver disease and have to limit fluids, talk with your doctor before you increase the amount of fluids you drink. Set up a regular time for using the toilet. If you continue to have constipation, your doctor may suggest using a bulking agent, such as Metamucil, or a stool softener, laxative, or enema. Medicines  Take your medicines exactly as prescribed. Call your doctor if you think you are having a problem with your medicine. You may be taking several medicines. ACE (angiotensin-converting enzyme) inhibitors, angiotensin II receptor blockers (ARBs), beta-blockers, diuretics (water pills), and calcium channel blockers control your blood pressure. Statins help lower cholesterol. Your doctor may also prescribe medicines for depression, pain, sleep problems, anxiety, or agitation. If your doctor has given you medicine that prevents blood clots, such as warfarin (Coumadin), aspirin combined with extended-release dipyridamole (Aggrenox), clopidogrel (Plavix), or aspirin to prevent another stroke, you should:  Tell your dentist, pharmacist, and other health professionals that you take these medicines. Watch for unusual bruising or bleeding, such as blood in your urine, red or black stools, or bleeding from your nose or gums.   Get regular blood tests to check your clotting time if you are taking Coumadin. Wear medical alert jewelry that says you take blood thinners. You can buy this at most [employer]. Do not take any over-the-counter medicines or herbal products without talking to your doctor first.  If you take birth control pills or hormone replacement therapy, talk to your doctor about whether they are right for you. For family members and caregivers  Make the home safe. Set up a room so that your loved one does not have to climb stairs. Be sure the bathroom is on the same floor. Move throw rugs and furniture that could cause falls, and make sure that the lighting is good. Put grab bars and seats in tubs and showers. Find out what your loved one can do and what he or she needs help with. Try not to do things for your loved one that your loved one can do on his or her own. Help him or her learn and practice new skills. Visit and talk with your loved one often. Try doing activities together that you both enjoy, such as playing cards or board games. Keep in touch with your loved one's friends as much as you can, and encourage them to visit. Take care of yourself. Do not try to do everything yourself. Ask other family members to help. Eat well, get enough rest, and take time to do things that you enjoy. Keep up with your own doctor visits, and make sure to take your medicines regularly. Get out of the house as much as you can. Join a local support group. Find out if you qualify for home health care visits to help with rehab or for adult day care. When should you call for help? Call 911 anytime you think you may need emergency care. For example, call if:  You have signs of another stroke. These may include:  Sudden numbness, paralysis, or weakness in your face, arm, or leg, especially on only one side of your body. New problems with walking or balance. Sudden vision changes. Drooling or slurred speech.   New problems speaking or understanding simple statements, or you feel confused. A sudden, severe headache that is different from past headaches. Call 911 even if these symptoms go away in a few minutes. You cough up blood. You vomit blood or what looks like coffee grounds. You pass maroon or very bloody stools. Call your doctor now or seek immediate medical care if:  You have new bruises or blood spots under your skin. You have a nosebleed. Your gums bleed when you brush your teeth. You have blood in your urine. Your stools are black and tarlike or have streaks of blood. You have vaginal bleeding when you are not having your period, or heavy period bleeding. You have new symptoms that may be related to your stroke, such as falls or trouble swallowing. Watch closely for changes in your health, and be sure to contact your doctor if you have any problems. Where can you learn more? Go to [employer]    Enter C294  in the search box to learn more about \"Stroke: After Your Visit\". © 3978-6058 Healthwise, Incorporated. Care instructions adapted under license by [facility] (which disclaims liability or warranty for this information). This care instruction is for use with your licensed healthcare professional. If you have questions about a medical condition or this instruction, always ask your healthcare professional. [place] any warranty or liability for your use of this information.

## 2021-11-17 NOTE — PROGRESS NOTES
Problem: Falls - Risk of  Goal: *Absence of Falls  Description: Document Clydene Bone Fall Risk and appropriate interventions in the flowsheet.   Outcome: Progressing Towards Goal  Note: Fall Risk Interventions:     Medication Interventions: Evaluate medications/consider consulting pharmacy, Teach patient to arise slowly

## 2021-11-30 PROBLEM — Z85.850 HISTORY OF THYROID CANCER: Status: ACTIVE | Noted: 2021-11-30

## 2021-11-30 PROBLEM — I25.10 CHRONIC CORONARY ARTERY DISEASE: Status: ACTIVE | Noted: 2021-11-30

## 2021-12-02 ENCOUNTER — TELEPHONE (OUTPATIENT)
Dept: DIABETES SERVICES | Age: 56
End: 2021-12-02

## 2021-12-02 ENCOUNTER — DOCUMENTATION ONLY (OUTPATIENT)
Dept: DIABETES SERVICES | Age: 56
End: 2021-12-02

## 2021-12-02 NOTE — PROGRESS NOTES
Call to patient about Diabetes Education. He is interested. Gave permission to verify insurance in 2022 and call back with results. Currently no assessments appointments in 2021.

## 2022-01-11 ENCOUNTER — TELEPHONE (OUTPATIENT)
Dept: DIABETES SERVICES | Age: 57
End: 2022-01-11

## 2022-01-11 NOTE — TELEPHONE ENCOUNTER
Call to patient to  Provide insurance information about Diabetes Education. Covered 100%. Asked ptatient to call to schedule. Left call back numbers.

## 2022-01-27 ENCOUNTER — TELEPHONE (OUTPATIENT)
Dept: DIABETES SERVICES | Age: 57
End: 2022-01-27

## 2022-01-31 ENCOUNTER — TELEPHONE (OUTPATIENT)
Dept: DIABETES SERVICES | Age: 57
End: 2022-01-31

## 2022-01-31 NOTE — TELEPHONE ENCOUNTER
Third phone attempt to pt letting him know he is covered for diabetes education at 100% and to please call us back. If we don't hear from pt by 2/8/22 we will send a letter. If we don't hear by 2/18/22 we will no longer pursue.

## 2022-03-18 PROBLEM — E66.01 MORBID OBESITY (HCC): Status: ACTIVE | Noted: 2021-03-23

## 2022-03-18 PROBLEM — I25.10 CHRONIC CORONARY ARTERY DISEASE: Status: ACTIVE | Noted: 2021-11-30

## 2022-03-18 PROBLEM — E78.49 OTHER HYPERLIPIDEMIA: Status: ACTIVE | Noted: 2021-11-15

## 2022-03-18 PROBLEM — M51.26 HNP (HERNIATED NUCLEUS PULPOSUS), LUMBAR: Status: ACTIVE | Noted: 2021-03-23

## 2022-03-19 PROBLEM — Z85.850 HISTORY OF THYROID CANCER: Status: ACTIVE | Noted: 2021-11-30

## 2022-03-20 PROBLEM — M43.17 SPONDYLOLISTHESIS AT L5-S1 LEVEL: Status: ACTIVE | Noted: 2021-03-23

## 2022-03-20 PROBLEM — I25.812 CORONARY ARTERY DISEASE INVOLVING BYPASS GRAFT OF TRANSPLANTED HEART WITHOUT ANGINA PECTORIS: Status: ACTIVE | Noted: 2021-11-15

## 2022-03-20 PROBLEM — G45.9 TIA (TRANSIENT ISCHEMIC ATTACK): Status: ACTIVE | Noted: 2021-11-15

## 2022-06-11 DIAGNOSIS — Z79.4 LONG TERM CURRENT USE OF INSULIN (HCC): ICD-10-CM

## 2022-06-11 DIAGNOSIS — E78.00 HYPERCHOLESTEROLEMIA: ICD-10-CM

## 2022-06-11 DIAGNOSIS — E11.65 UNCONTROLLED TYPE 2 DIABETES MELLITUS WITH HYPERGLYCEMIA (HCC): ICD-10-CM

## 2022-06-11 DIAGNOSIS — E89.0 POSTSURGICAL HYPOTHYROIDISM: Primary | ICD-10-CM

## 2022-10-20 LAB
AVERAGE GLUCOSE: ABNORMAL
HBA1C MFR BLD: 9.5 %

## 2022-10-26 ENCOUNTER — OFFICE VISIT (OUTPATIENT)
Dept: ENDOCRINOLOGY | Age: 57
End: 2022-10-26
Payer: COMMERCIAL

## 2022-10-26 VITALS
DIASTOLIC BLOOD PRESSURE: 76 MMHG | WEIGHT: 315 LBS | BODY MASS INDEX: 44.38 KG/M2 | OXYGEN SATURATION: 97 % | HEART RATE: 80 BPM | SYSTOLIC BLOOD PRESSURE: 148 MMHG

## 2022-10-26 DIAGNOSIS — E78.00 HYPERCHOLESTEROLEMIA: ICD-10-CM

## 2022-10-26 DIAGNOSIS — Z79.4 TYPE 2 DIABETES MELLITUS WITH HYPERGLYCEMIA, WITH LONG-TERM CURRENT USE OF INSULIN (HCC): Primary | ICD-10-CM

## 2022-10-26 DIAGNOSIS — I25.10 CHRONIC CORONARY ARTERY DISEASE: ICD-10-CM

## 2022-10-26 DIAGNOSIS — I10 ESSENTIAL HYPERTENSION: ICD-10-CM

## 2022-10-26 DIAGNOSIS — E89.0 POSTSURGICAL HYPOTHYROIDISM: ICD-10-CM

## 2022-10-26 DIAGNOSIS — E11.65 TYPE 2 DIABETES MELLITUS WITH HYPERGLYCEMIA, WITH LONG-TERM CURRENT USE OF INSULIN (HCC): Primary | ICD-10-CM

## 2022-10-26 DIAGNOSIS — Z85.850 HISTORY OF THYROID CANCER: ICD-10-CM

## 2022-10-26 PROCEDURE — 3074F SYST BP LT 130 MM HG: CPT | Performed by: INTERNAL MEDICINE

## 2022-10-26 PROCEDURE — 3078F DIAST BP <80 MM HG: CPT | Performed by: INTERNAL MEDICINE

## 2022-10-26 PROCEDURE — 3046F HEMOGLOBIN A1C LEVEL >9.0%: CPT | Performed by: INTERNAL MEDICINE

## 2022-10-26 PROCEDURE — 99214 OFFICE O/P EST MOD 30 MIN: CPT | Performed by: INTERNAL MEDICINE

## 2022-10-26 RX ORDER — TRAMADOL HYDROCHLORIDE 50 MG/1
TABLET ORAL
COMMUNITY
Start: 2020-01-22

## 2022-10-26 RX ORDER — DULAGLUTIDE 1.5 MG/.5ML
1.5 INJECTION, SOLUTION SUBCUTANEOUS
Qty: 12 ADJUSTABLE DOSE PRE-FILLED PEN SYRINGE | Refills: 3 | Status: SHIPPED | OUTPATIENT
Start: 2022-10-26

## 2022-10-26 RX ORDER — INSULIN DEGLUDEC 200 U/ML
100 INJECTION, SOLUTION SUBCUTANEOUS 2 TIMES DAILY
Qty: 90 ML | Refills: 3 | Status: SHIPPED | OUTPATIENT
Start: 2022-10-26

## 2022-10-26 RX ORDER — LEVOTHYROXINE SODIUM 0.12 MG/1
250 TABLET ORAL
Qty: 180 TABLET | Refills: 3 | Status: SHIPPED | OUTPATIENT
Start: 2022-10-26

## 2022-10-26 RX ORDER — ATORVASTATIN CALCIUM 80 MG/1
80 TABLET, FILM COATED ORAL DAILY
Qty: 90 TABLET | Refills: 3 | Status: SHIPPED | OUTPATIENT
Start: 2022-10-26

## 2022-10-26 RX ORDER — FENOFIBRATE 160 MG/1
160 TABLET ORAL DAILY
Qty: 90 TABLET | Refills: 3 | Status: SHIPPED | OUTPATIENT
Start: 2022-10-26

## 2022-10-26 RX ORDER — DAPAGLIFLOZIN 10 MG/1
10 TABLET, FILM COATED ORAL EVERY MORNING
Qty: 90 TABLET | Refills: 3 | Status: SHIPPED | OUTPATIENT
Start: 2022-10-26

## 2022-10-26 NOTE — PROGRESS NOTES
Mahamed Luke MD, [address] [facility]            Reason for visit: Follow-up of diabetes, thyroid cancer, and hypothyroidism      ASSESSMENT AND PLAN:    1. Type 2 diabetes mellitus with hyperglycemia, with long-term current use of insulin (HCC)  Glycemic control is suboptimal.  He had been off of Trulicity and [place] due to inability to afford the medications. That situation is improved. I will increase his Tresiba dose. Continue Trulicity and [place] as prescribed. - TRULICITY 1.5 KB/8.8SU SC injection; Inject 0.5 mLs into the skin every 7 days  Dispense: 12 Adjustable Dose Pre-filled Pen Syringe; Refill: 3  - TRESIBA FLEXTOUCH 200 UNIT/ML SOPN; Inject 100 Units into the skin 2 times daily  Dispense: 90 mL; Refill: 3  - FARXIGA 10 MG tablet; Take 1 tablet by mouth every morning  Dispense: 90 tablet; Refill: 3  - Comprehensive Metabolic Panel; Future  - Hemoglobin A1C; Future  - Microalbumin / Creatinine Urine Ratio; Future    2. History of thyroid cancer  He has a history of thyroid cancer. I suspect that he had incidental microscopic papillary carcinoma but am not certain about that. Thyroglobulin was undetectable in April 2022. - Thyroglobulin Panel; Future    3. Postsurgical hypothyroidism  - levothyroxine (SYNTHROID) 125 MCG tablet; Take 2 tablets by mouth every morning (before breakfast)  Dispense: 180 tablet; Refill: 3  - TSH; Future  - T4, Free; Future    4. Hypercholesterolemia  - fenofibrate (TRIGLIDE) 160 MG tablet; Take 1 tablet by mouth daily  Dispense: 90 tablet; Refill: 3  - atorvastatin (LIPITOR) 80 MG tablet; Take 1 tablet by mouth daily  Dispense: 90 tablet; Refill: 3  - Lipid Panel; Future    5. Essential hypertension  - metoprolol tartrate (LOPRESSOR) 25 MG tablet; Take 1 tablet by mouth 2 times daily  Dispense: 180 tablet; Refill: 3    6. Chronic coronary artery disease  - TRULICITY 1.5 NV/8.5BN SC injection;  Inject 0.5 mLs into the skin [facility]. Type of thyroid cancer: ?     Date thyroid cancer diagnosed: 2011 - incidentally noted at the time of surgery for treatment of a thyroid nodule and hyperthyroidism     Date(s) of surgery: 2011 ([facility])     Surgical pathology: ? AJCC stage: ?     Radioactive iodine treatment: ?     Treatment of hypothyroidism: Levothyroxine was started following thyroidectomy. She segura been on levothyroxine 225 mcg daily since at least 2018. More recent dose adjustments have been as follows:  -250 mcg daily 11/30/2021     Labs:  2/6/2018: TSH 35.080, free T4 0.60.  8/7/2018: TSH 15.122, free T4 0.99, T3 88.  10/3/2018: TSH 20.719.  6/9/2021: TSH 9.160, free T4 1.34.  10/11/2021: TSH 5.980.  4/19/2022: TSH 1.300, free T4 1.69, thyroglobulin less than 0.1, thyroglobulin antibodies less than 1.0.  10/20/2022: TSH 4.510, free T4 1.31. Imaging:  None since surgery     Current symptoms: See review of systems below     Family history of thyroid cancer:  No    Review of Systems   Constitutional:  Positive for fatigue and unexpected weight change (gained 9 pounds in 6 months). Eyes:  Negative for visual disturbance. Cardiovascular:  Negative for palpitations. BP (!) 148/76   Pulse 80   Wt (!) 327 lb 3.2 oz (148.4 kg)   SpO2 97%   BMI 44.38 kg/m²   Wt Readings from Last 3 Encounters:   10/26/22 (!) 327 lb 3.2 oz (148.4 kg)   04/19/22 (!) 318 lb (144.2 kg)   12/02/21 (!) 318 lb 11.2 oz (144.6 kg)       Physical Exam  Constitutional:       Appearance: Normal appearance. HENT:      Head: Normocephalic. Neck:      Thyroid: No thyroid mass or thyromegaly. Comments: Healed thyroidectomy scar. No palpable neck masses. Cardiovascular:      Rate and Rhythm: Normal rate and regular rhythm. Pulses:           Dorsalis pedis pulses are 3+ on the right side and 3+ on the left side. Pulmonary:      Effort: Pulmonary effort is normal.      Breath sounds: Normal breath sounds.    Neurological:      Mental Status: He is alert. Comments: Monofilament sensation intact. Psychiatric:         Mood and Affect: Mood normal.         Behavior: Behavior normal.       Orders Placed This Encounter   Procedures    TSH     Standing Status:   Future     Standing Expiration Date:   10/26/2023    T4, Free     Standing Status:   Future     Standing Expiration Date:   10/26/2023    Thyroglobulin Panel     Standing Status:   Future     Standing Expiration Date:   10/26/2023    Comprehensive Metabolic Panel     Standing Status:   Future     Standing Expiration Date:   10/26/2023    Hemoglobin A1C     Standing Status:   Future     Standing Expiration Date:   10/26/2023    Lipid Panel     Standing Status:   Future     Standing Expiration Date:   10/26/2023    Microalbumin / Creatinine Urine Ratio     Standing Status:   Future     Standing Expiration Date:   10/26/2023         Current Outpatient Medications   Medication Sig Dispense Refill    traMADol (ULTRAM) 50 MG tablet Take by mouth.       TRULICITY 1.5 NU/3.3PH SC injection Inject 0.5 mLs into the skin every 7 days 12 Adjustable Dose Pre-filled Pen Syringe 3    TRESIBA FLEXTOUCH 200 UNIT/ML SOPN Inject 100 Units into the skin 2 times daily 90 mL 3    levothyroxine (SYNTHROID) 125 MCG tablet Take 2 tablets by mouth every morning (before breakfast) 180 tablet 3    fenofibrate (TRIGLIDE) 160 MG tablet Take 1 tablet by mouth daily 90 tablet 3    atorvastatin (LIPITOR) 80 MG tablet Take 1 tablet by mouth daily 90 tablet 3    FARXIGA 10 MG tablet Take 1 tablet by mouth every morning 90 tablet 3    metoprolol tartrate (LOPRESSOR) 25 MG tablet Take 1 tablet by mouth 2 times daily 180 tablet 3    acetaminophen (TYLENOL) 500 MG tablet Take 500 mg by mouth as needed      aspirin 81 MG EC tablet Take by mouth      cetirizine (ZYRTEC) 10 MG tablet Take 10 mg by mouth as needed      cyclobenzaprine (FLEXERIL) 10 MG tablet Take 10 mg by mouth 3 times daily as needed      esomeprazole (NEXIUM) 40 MG delayed release capsule Take 40 mg by mouth 2 times daily       No current facility-administered medications for this visit. Deb Fang MD, FACE      Portions of this note were generated with the assistance of voice recognition software. As such, some errors in transcription may be present.

## 2022-11-16 RX ORDER — ESOMEPRAZOLE MAGNESIUM 40 MG/1
CAPSULE, DELAYED RELEASE ORAL
Qty: 180 CAPSULE | Refills: 3 | Status: SHIPPED | OUTPATIENT
Start: 2022-11-16

## 2022-11-21 ENCOUNTER — TELEPHONE (OUTPATIENT)
Dept: NEUROSURGERY | Age: 57
End: 2022-11-21

## 2022-11-21 NOTE — TELEPHONE ENCOUNTER
Patient is having right leg pain, again. He was last seen in 08/2021. Muscle relaxers helped a little in the past, but he is having a lot more pain and pressure, now.

## 2022-11-28 ENCOUNTER — PATIENT MESSAGE (OUTPATIENT)
Dept: NEUROSURGERY | Age: 57
End: 2022-11-28

## 2022-11-28 DIAGNOSIS — M43.17 SPONDYLOLISTHESIS, LUMBOSACRAL REGION: Primary | ICD-10-CM

## 2022-11-28 RX ORDER — METHYLPREDNISOLONE 4 MG/1
TABLET ORAL
Qty: 1 KIT | Refills: 0 | Status: SHIPPED | OUTPATIENT
Start: 2022-11-28 | End: 2022-12-04

## 2022-11-28 NOTE — TELEPHONE ENCOUNTER
From: Matt Molina  To: Dr. Cheney Monday: 11/28/2022 2:07 PM EST  Subject: Back Pain. Hello, I tied to call last week and it seems we were playing phone tag. I know it has been a while since I have been in to see you. I have been doing well for the most part. I have good days and bad. Recently I have been having pain across both sides of my lower back, and running down my right leg. I am just assuming it is like the last time I contacted you and that it is just inflammation. do I need to schedule an appointment to let you have a look at it? Or do we just need to get more muscle relaxers? ? let me know what you think. Thank you for your time.  You can call me if needed [phone]

## 2022-12-29 ENCOUNTER — PATIENT MESSAGE (OUTPATIENT)
Dept: FAMILY MEDICINE CLINIC | Facility: CLINIC | Age: 57
End: 2022-12-29

## 2022-12-29 NOTE — TELEPHONE ENCOUNTER
Sent in prescription for:     Requested Prescriptions     Signed Prescriptions Disp Refills    nirmatrelvir/ritonavir (PAXLOVID) 20 x 150 MG & 10 x 100MG TBPK 30 tablet 0     Sig: Take 3 tablets (two 150 mg nirmatrelvir and one 100 mg ritonavir tablets) by mouth every 12 hours for 5 days.   Normal renal function     Authorizing Provider: Jayce Musa

## 2022-12-29 NOTE — TELEPHONE ENCOUNTER
From: Jamie Tucker  To: Dr. Rafi Guan: 12/29/2022 10:30 AM EST  Subject: Possible covid illness    Good morning, my whole family (5 Adults) has been exposed to covid. 1 person has tested positive the rest are negative, but most of us are showing symptoms. I just took another test with negative results. I am unvaccinated. What action do you suggest I have a cough, stuffy nose, slight soar throat, and body achs.

## 2023-01-20 ENCOUNTER — PATIENT MESSAGE (OUTPATIENT)
Dept: ENDOCRINOLOGY | Age: 58
End: 2023-01-20

## 2023-01-23 ENCOUNTER — TELEPHONE (OUTPATIENT)
Dept: FAMILY MEDICINE CLINIC | Facility: CLINIC | Age: 58
End: 2023-01-23

## 2023-01-23 ENCOUNTER — TELEPHONE (OUTPATIENT)
Dept: NEUROSURGERY | Age: 58
End: 2023-01-23

## 2023-01-23 DIAGNOSIS — M51.26 HNP (HERNIATED NUCLEUS PULPOSUS), LUMBAR: ICD-10-CM

## 2023-01-23 DIAGNOSIS — M43.17 SPONDYLOLISTHESIS AT L5-S1 LEVEL: Primary | ICD-10-CM

## 2023-01-23 RX ORDER — BLOOD-GLUCOSE TRANSMITTER
EACH MISCELLANEOUS
Qty: 1 EACH | Refills: 3 | Status: SHIPPED | OUTPATIENT
Start: 2023-01-23

## 2023-01-23 RX ORDER — BLOOD-GLUCOSE SENSOR
EACH MISCELLANEOUS
Qty: 9 EACH | Refills: 3 | Status: SHIPPED | OUTPATIENT
Start: 2023-01-23

## 2023-01-23 NOTE — TELEPHONE ENCOUNTER
REFERRAL REQUEST      Reason for Referral: back pain       If its a hand, shoulder, foot, or leg- Is it Left or Right?  Varies Down both legs       How long has the problem been going on: 1 year        Have you been seen for this problem within the last 2-3 weeks : no         Do you have a doctor preference: yes    If yes who would you like to see Dr Michael Ridley     Patient was a patient of Dr Michael Ridley prior and the Dr has been out for a little while so he needs a new referral to see him

## 2023-01-23 NOTE — TELEPHONE ENCOUNTER
Patient calling with continued LBP that radiates to his RLE to his toes- difficulty getting out of bed. Last office visit with DR. Anjana Dempsey was  and never returned for 6 month f/u with films    Patient is req an appointment  L5-S1 TLIF on 5-2021

## 2023-01-23 NOTE — TELEPHONE ENCOUNTER
From: Yara Clemens  To: Dr. Melchor November: 1/20/2023 1:54 PM EST  Subject: Question about Dexcom sensors    Good afternoon, is it possible for you to send in a prescription for Dexcom g6 sensors for me to try. We were going to try Freestyle, but my insurance would not cover then. Also if you can it needs to be a 90 day supply. I would like to get this and be using it before my next appointment.  Thank you

## 2023-01-31 DIAGNOSIS — G89.29 CHRONIC LOW BACK PAIN, UNSPECIFIED BACK PAIN LATERALITY, UNSPECIFIED WHETHER SCIATICA PRESENT: Primary | ICD-10-CM

## 2023-01-31 DIAGNOSIS — M54.50 CHRONIC LOW BACK PAIN, UNSPECIFIED BACK PAIN LATERALITY, UNSPECIFIED WHETHER SCIATICA PRESENT: Primary | ICD-10-CM

## 2023-02-02 ENCOUNTER — OFFICE VISIT (OUTPATIENT)
Dept: NEUROSURGERY | Age: 58
End: 2023-02-02
Payer: COMMERCIAL

## 2023-02-02 ENCOUNTER — HOSPITAL ENCOUNTER (OUTPATIENT)
Dept: GENERAL RADIOLOGY | Age: 58
Discharge: HOME | End: 2023-02-02
Payer: COMMERCIAL

## 2023-02-02 VITALS
DIASTOLIC BLOOD PRESSURE: 86 MMHG | HEART RATE: 74 BPM | WEIGHT: 315 LBS | BODY MASS INDEX: 42.66 KG/M2 | HEIGHT: 72 IN | TEMPERATURE: 97.2 F | SYSTOLIC BLOOD PRESSURE: 166 MMHG

## 2023-02-02 DIAGNOSIS — M43.17 SPONDYLOLISTHESIS, LUMBOSACRAL REGION: Primary | ICD-10-CM

## 2023-02-02 DIAGNOSIS — M54.50 CHRONIC LOW BACK PAIN, UNSPECIFIED BACK PAIN LATERALITY, UNSPECIFIED WHETHER SCIATICA PRESENT: ICD-10-CM

## 2023-02-02 DIAGNOSIS — G89.29 CHRONIC LOW BACK PAIN, UNSPECIFIED BACK PAIN LATERALITY, UNSPECIFIED WHETHER SCIATICA PRESENT: ICD-10-CM

## 2023-02-02 DIAGNOSIS — E66.01 MORBID (SEVERE) OBESITY DUE TO EXCESS CALORIES (HCC): ICD-10-CM

## 2023-02-02 PROCEDURE — 3077F SYST BP >= 140 MM HG: CPT | Performed by: NEUROLOGICAL SURGERY

## 2023-02-02 PROCEDURE — 99214 OFFICE O/P EST MOD 30 MIN: CPT | Performed by: NEUROLOGICAL SURGERY

## 2023-02-02 PROCEDURE — 3079F DIAST BP 80-89 MM HG: CPT | Performed by: NEUROLOGICAL SURGERY

## 2023-02-02 PROCEDURE — 72100 X-RAY EXAM L-S SPINE 2/3 VWS: CPT

## 2023-02-02 RX ORDER — MELOXICAM 15 MG/1
15 TABLET ORAL DAILY
Qty: 30 TABLET | Refills: 5 | Status: SHIPPED | OUTPATIENT
Start: 2023-02-02

## 2023-02-02 ASSESSMENT — PATIENT HEALTH QUESTIONNAIRE - PHQ9
1. LITTLE INTEREST OR PLEASURE IN DOING THINGS: 0
SUM OF ALL RESPONSES TO PHQ QUESTIONS 1-9: 0
SUM OF ALL RESPONSES TO PHQ QUESTIONS 1-9: 0
2. FEELING DOWN, DEPRESSED OR HOPELESS: 0
SUM OF ALL RESPONSES TO PHQ9 QUESTIONS 1 & 2: 0
SUM OF ALL RESPONSES TO PHQ QUESTIONS 1-9: 0
SUM OF ALL RESPONSES TO PHQ QUESTIONS 1-9: 0

## 2023-02-02 NOTE — PROGRESS NOTES
History of Present Illness    Patient Words: 56 y.o. This patient is a 56 y.o. male who presents today for neurosurgical consultation. He is status post L5-S1 transforaminal lumbar interbody fusion approximately 2 years ago with good results. He had a slip and fall at work injured his left shoulder and left side of his neck he also fell on his back. He has had some lower back pain and some bilateral extremity pain at times. He does take ibuprofen and Tylenol when symptoms are increasing but does not take it regularly. Lumbar spine x-rays obtained today and reviewed with the patient and compared to previous x-rays show stable appearance to his fusion at L5-S1 with no hardware complications noted.     Past Medical History:   Diagnosis Date    Allergic rhinitis     Coronary artery disease     COVID-19 03/25/2021    Erectile dysfunction     Essential hypertension     GERD (gastroesophageal reflux disease)     Hypercholesterolemia     Lung nodule     Morbid obesity (HCC)     Obstructive sleep apnea on CPAP     Postsurgical hypothyroidism     Thyroid cancer (HCC)     Type 2 diabetes mellitus (HCC)         No Known Allergies     Family History   Problem Relation Age of Onset    Thyroid Disease Neg Hx     Thyroid Cancer Neg Hx     Diabetes Father     Heart Disease Father     Diabetes Brother     Diabetes Maternal Grandfather         Social History     Socioeconomic History    Marital status:      Spouse name: Not on file    Number of children: Not on file    Years of education: Not on file    Highest education level: Not on file   Occupational History    Not on file   Tobacco Use    Smoking status: Never    Smokeless tobacco: Former   Substance and Sexual Activity    Alcohol use: Never    Drug use: Never    Sexual activity: Not on file   Other Topics Concern    Not on file   Social History Narrative    Not on file     Social Determinants of Health     Financial Resource Strain: Not on file   Food Insecurity: No Not on file   Transportation Needs: Not on file   Physical Activity: Not on file   Stress: Not on file   Social Connections: Not on file   Intimate Partner Violence: Not on file   Housing Stability: Not on file       Current Outpatient Medications   Medication Sig Dispense Refill    meloxicam (MOBIC) 15 MG tablet Take 1 tablet by mouth daily 30 tablet 5    Continuous Blood Gluc Transmit (DEXCOM G6 TRANSMITTER) MISC Change every 90 days. E11.65 1 each 3    Continuous Blood Gluc Sensor (DEXCOM G6 SENSOR) MISC Change every 10 days. E11.65 9 each 3    esomeprazole (NEXIUM) 40 MG delayed release capsule TAKE 1 CAPSULE BY MOUTH TWO (2) TIMES A DAY. 180 capsule 3    TRULICITY 1.5 UO/9.6UW SC injection Inject 0.5 mLs into the skin every 7 days 12 Adjustable Dose Pre-filled Pen Syringe 3    TRESIBA FLEXTOUCH 200 UNIT/ML SOPN Inject 100 Units into the skin 2 times daily 90 mL 3    levothyroxine (SYNTHROID) 125 MCG tablet Take 2 tablets by mouth every morning (before breakfast) 180 tablet 3    fenofibrate (TRIGLIDE) 160 MG tablet Take 1 tablet by mouth daily 90 tablet 3    atorvastatin (LIPITOR) 80 MG tablet Take 1 tablet by mouth daily 90 tablet 3    FARXIGA 10 MG tablet Take 1 tablet by mouth every morning 90 tablet 3    metoprolol tartrate (LOPRESSOR) 25 MG tablet Take 1 tablet by mouth 2 times daily 180 tablet 3    acetaminophen (TYLENOL) 500 MG tablet Take 500 mg by mouth as needed      aspirin 81 MG EC tablet Take by mouth      cetirizine (ZYRTEC) 10 MG tablet Take 10 mg by mouth as needed      nirmatrelvir/ritonavir (PAXLOVID) 20 x 150 MG & 10 x 100MG TBPK Take 3 tablets (two 150 mg nirmatrelvir and one 100 mg ritonavir tablets) by mouth every 12 hours for 5 days. Normal renal function 30 tablet 0    traMADol (ULTRAM) 50 MG tablet Take by mouth. No current facility-administered medications for this visit.        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 BP (!) 166/86   Pulse 74   Temp 97.2 °F (36.2 °C) (Temporal)   Ht 6' (1.829 m)   Wt (!) 325 lb (147.4 kg)   PF 97 L/min   BMI 44.08 kg/m²        Physical Exam  The physical exam findings are as follows:      Cranial Nerves:   Intact visual fields. Fundi are benign. EVELIO, EOM's full, no nystagmus, no ptosis. Facial sensation is normal. Corneal reflexes are intact. Facial movement is symmetric. Hearing is normal bilaterally. Palate is midline with normal sternocleidomastoid and trapezius muscles are normal. Tongue is midline. Motor:  5/5 strength in upper and lower proximal and distal muscles. Normal bulk and tone. No fasciculations. Reflexes:   Deep tendon reflexes 2+/4 and symmetrical.   Sensory:   Normal to touch, pinprick and vibration. Gait:  Normal gait. Tremor:   No tremor noted. Cerebellar:  No cerebellar signs present. Assessment & Plan      ICD-10-CM    1. Spondylolisthesis, lumbosacral region  M43.17       2. Morbid (severe) obesity due to excess calories ([facility])  E66.01          I have recommended Mobic 15 mg/day daily due to his arthritic back condition. He can then supplement with Tylenol or Advil as needed however if he has a regular daily dose of medication this would certainly help treat his inflammatory arthritis in the lumbar spine. Follow-up as needed.   He continues to be symptomatic MRI scan of the lumbar spine will be ordered.     Hazel De Leon MD

## 2023-04-17 ENCOUNTER — OFFICE VISIT (OUTPATIENT)
Dept: ENDOCRINOLOGY | Age: 58
End: 2023-04-17
Payer: COMMERCIAL

## 2023-04-17 VITALS
BODY MASS INDEX: 44.08 KG/M2 | HEART RATE: 85 BPM | SYSTOLIC BLOOD PRESSURE: 118 MMHG | DIASTOLIC BLOOD PRESSURE: 62 MMHG | OXYGEN SATURATION: 95 % | WEIGHT: 315 LBS

## 2023-04-17 DIAGNOSIS — I25.10 CHRONIC CORONARY ARTERY DISEASE: ICD-10-CM

## 2023-04-17 DIAGNOSIS — E78.00 HYPERCHOLESTEROLEMIA: ICD-10-CM

## 2023-04-17 DIAGNOSIS — I10 ESSENTIAL HYPERTENSION: ICD-10-CM

## 2023-04-17 DIAGNOSIS — Z85.850 HISTORY OF THYROID CANCER: ICD-10-CM

## 2023-04-17 DIAGNOSIS — Z79.4 TYPE 2 DIABETES MELLITUS WITH HYPERGLYCEMIA, WITH LONG-TERM CURRENT USE OF INSULIN (HCC): Primary | ICD-10-CM

## 2023-04-17 DIAGNOSIS — E11.65 TYPE 2 DIABETES MELLITUS WITH HYPERGLYCEMIA, WITH LONG-TERM CURRENT USE OF INSULIN (HCC): Primary | ICD-10-CM

## 2023-04-17 DIAGNOSIS — E89.0 POSTSURGICAL HYPOTHYROIDISM: ICD-10-CM

## 2023-04-17 PROCEDURE — 3078F DIAST BP <80 MM HG: CPT | Performed by: INTERNAL MEDICINE

## 2023-04-17 PROCEDURE — 99214 OFFICE O/P EST MOD 30 MIN: CPT | Performed by: INTERNAL MEDICINE

## 2023-04-17 PROCEDURE — 3052F HG A1C>EQUAL 8.0%<EQUAL 9.0%: CPT | Performed by: INTERNAL MEDICINE

## 2023-04-17 PROCEDURE — 3074F SYST BP LT 130 MM HG: CPT | Performed by: INTERNAL MEDICINE

## 2023-04-17 RX ORDER — ATORVASTATIN CALCIUM 80 MG/1
80 TABLET, FILM COATED ORAL DAILY
Qty: 90 TABLET | Refills: 3 | Status: SHIPPED | OUTPATIENT
Start: 2023-04-17

## 2023-04-17 RX ORDER — FENOFIBRATE 160 MG/1
160 TABLET ORAL DAILY
Qty: 90 TABLET | Refills: 3 | Status: SHIPPED | OUTPATIENT
Start: 2023-04-17

## 2023-04-17 RX ORDER — INSULIN DEGLUDEC 200 U/ML
110 INJECTION, SOLUTION SUBCUTANEOUS 2 TIMES DAILY
Qty: 90 ML | Refills: 3 | Status: SHIPPED | OUTPATIENT
Start: 2023-04-17

## 2023-04-17 RX ORDER — DULAGLUTIDE 3 MG/.5ML
3 INJECTION, SOLUTION SUBCUTANEOUS WEEKLY
Qty: 12 ADJUSTABLE DOSE PRE-FILLED PEN SYRINGE | Refills: 3 | Status: SHIPPED | OUTPATIENT
Start: 2023-04-17

## 2023-04-17 RX ORDER — DAPAGLIFLOZIN 10 MG/1
10 TABLET, FILM COATED ORAL EVERY MORNING
Qty: 90 TABLET | Refills: 3 | Status: SHIPPED | OUTPATIENT
Start: 2023-04-17

## 2023-04-17 RX ORDER — LEVOTHYROXINE SODIUM 0.12 MG/1
250 TABLET ORAL
Qty: 180 TABLET | Refills: 3 | Status: SHIPPED | OUTPATIENT
Start: 2023-04-17

## 2023-04-17 NOTE — PROGRESS NOTES
100 mg ritonavir tablets) by mouth every 12 hours for 5 days. Normal renal function 30 tablet 0    traMADol (ULTRAM) 50 MG tablet Take by mouth. No current facility-administered medications for this visit. Radha Renteria MD, FACE      Portions of this note were generated with the assistance of voice recognition software. As such, some errors in transcription may be present.

## 2023-08-03 RX ORDER — MELOXICAM 15 MG/1
TABLET ORAL
Qty: 30 TABLET | Refills: 5 | Status: SHIPPED | OUTPATIENT
Start: 2023-08-03

## 2023-10-07 DIAGNOSIS — Z79.4 TYPE 2 DIABETES MELLITUS WITH HYPERGLYCEMIA, WITH LONG-TERM CURRENT USE OF INSULIN (HCC): ICD-10-CM

## 2023-10-07 DIAGNOSIS — E11.65 TYPE 2 DIABETES MELLITUS WITH HYPERGLYCEMIA, WITH LONG-TERM CURRENT USE OF INSULIN (HCC): ICD-10-CM

## 2023-10-09 RX ORDER — INSULIN DEGLUDEC 200 U/ML
INJECTION, SOLUTION SUBCUTANEOUS
Refills: 3 | OUTPATIENT
Start: 2023-10-09

## 2023-10-09 NOTE — TELEPHONE ENCOUNTER
Left a voicemail to see if the patient needs a prescription refill now or can wait till their appointment.

## 2023-10-16 RX ORDER — INSULIN DEGLUDEC 200 U/ML
INJECTION, SOLUTION SUBCUTANEOUS
Qty: 90 ML | Refills: 3 | Status: SHIPPED | OUTPATIENT
Start: 2023-10-16

## 2024-02-13 RX ORDER — MELOXICAM 15 MG/1
TABLET ORAL
Qty: 30 TABLET | Refills: 0 | Status: SHIPPED | OUTPATIENT
Start: 2024-02-13

## 2024-03-18 RX ORDER — MELOXICAM 15 MG/1
TABLET ORAL
Qty: 30 TABLET | Refills: 0 | OUTPATIENT
Start: 2024-03-18

## 2024-04-06 LAB
ALBUMIN SERPL-MCNC: 4 G/DL (ref 3.8–4.9)
ALBUMIN/GLOB SERPL: 1.8 {RATIO} (ref 1.2–2.2)
ALP SERPL-CCNC: 79 IU/L (ref 44–121)
ALT SERPL-CCNC: 27 IU/L (ref 0–44)
AST SERPL-CCNC: 28 IU/L (ref 0–40)
BILIRUB SERPL-MCNC: 0.7 MG/DL (ref 0–1.2)
BUN SERPL-MCNC: 15 MG/DL (ref 6–24)
BUN/CREAT SERPL: 14 (ref 9–20)
CALCIUM SERPL-MCNC: 9 MG/DL (ref 8.7–10.2)
CHLORIDE SERPL-SCNC: 103 MMOL/L (ref 96–106)
CHOLEST SERPL-MCNC: 132 MG/DL (ref 100–199)
CO2 SERPL-SCNC: 23 MMOL/L (ref 20–29)
CREAT SERPL-MCNC: 1.04 MG/DL (ref 0.76–1.27)
EGFRCR SERPLBLD CKD-EPI 2021: 84 ML/MIN/1.73
GLOBULIN SER CALC-MCNC: 2.2 G/DL (ref 1.5–4.5)
GLUCOSE SERPL-MCNC: 248 MG/DL (ref 70–99)
HBA1C MFR BLD: 9.2 % (ref 4.8–5.6)
HDLC SERPL-MCNC: 33 MG/DL
LDLC SERPL CALC-MCNC: 66 MG/DL (ref 0–99)
POTASSIUM SERPL-SCNC: 4.6 MMOL/L (ref 3.5–5.2)
PROT SERPL-MCNC: 6.2 G/DL (ref 6–8.5)
SODIUM SERPL-SCNC: 141 MMOL/L (ref 134–144)
SPECIMEN STATUS REPORT: NORMAL
T4 FREE SERPL-MCNC: 1.5 NG/DL (ref 0.82–1.77)
TRIGL SERPL-MCNC: 196 MG/DL (ref 0–149)
TSH SERPL DL<=0.005 MIU/L-ACNC: 2.45 UIU/ML (ref 0.45–4.5)
VLDLC SERPL CALC-MCNC: 33 MG/DL (ref 5–40)

## 2024-04-10 ENCOUNTER — OFFICE VISIT (OUTPATIENT)
Dept: ENDOCRINOLOGY | Age: 59
End: 2024-04-10
Payer: COMMERCIAL

## 2024-04-10 VITALS
BODY MASS INDEX: 44.62 KG/M2 | HEART RATE: 69 BPM | DIASTOLIC BLOOD PRESSURE: 81 MMHG | OXYGEN SATURATION: 98 % | SYSTOLIC BLOOD PRESSURE: 122 MMHG | WEIGHT: 315 LBS

## 2024-04-10 DIAGNOSIS — E78.00 HYPERCHOLESTEROLEMIA: ICD-10-CM

## 2024-04-10 DIAGNOSIS — I25.10 CHRONIC CORONARY ARTERY DISEASE: ICD-10-CM

## 2024-04-10 DIAGNOSIS — E89.0 POSTSURGICAL HYPOTHYROIDISM: ICD-10-CM

## 2024-04-10 DIAGNOSIS — Z79.4 TYPE 2 DIABETES MELLITUS WITH HYPERGLYCEMIA, WITH LONG-TERM CURRENT USE OF INSULIN (HCC): Primary | ICD-10-CM

## 2024-04-10 DIAGNOSIS — Z85.850 HISTORY OF THYROID CANCER: ICD-10-CM

## 2024-04-10 DIAGNOSIS — I10 ESSENTIAL HYPERTENSION: ICD-10-CM

## 2024-04-10 DIAGNOSIS — E11.65 TYPE 2 DIABETES MELLITUS WITH HYPERGLYCEMIA, WITH LONG-TERM CURRENT USE OF INSULIN (HCC): Primary | ICD-10-CM

## 2024-04-10 PROCEDURE — 99214 OFFICE O/P EST MOD 30 MIN: CPT | Performed by: INTERNAL MEDICINE

## 2024-04-10 PROCEDURE — 3046F HEMOGLOBIN A1C LEVEL >9.0%: CPT | Performed by: INTERNAL MEDICINE

## 2024-04-10 PROCEDURE — 3074F SYST BP LT 130 MM HG: CPT | Performed by: INTERNAL MEDICINE

## 2024-04-10 PROCEDURE — 3079F DIAST BP 80-89 MM HG: CPT | Performed by: INTERNAL MEDICINE

## 2024-04-10 RX ORDER — INSULIN DEGLUDEC 200 U/ML
120 INJECTION, SOLUTION SUBCUTANEOUS 2 TIMES DAILY
Qty: 90 ML | Refills: 3 | Status: SHIPPED | OUTPATIENT
Start: 2024-04-10

## 2024-04-10 RX ORDER — ACYCLOVIR 400 MG/1
TABLET ORAL
Qty: 9 EACH | Refills: 3 | Status: SHIPPED | OUTPATIENT
Start: 2024-04-10

## 2024-04-10 RX ORDER — LEVOTHYROXINE SODIUM 0.12 MG/1
250 TABLET ORAL
Qty: 180 TABLET | Refills: 3 | Status: SHIPPED | OUTPATIENT
Start: 2024-04-10

## 2024-04-10 RX ORDER — DAPAGLIFLOZIN 10 MG/1
10 TABLET, FILM COATED ORAL EVERY MORNING
Qty: 90 TABLET | Refills: 3 | Status: SHIPPED | OUTPATIENT
Start: 2024-04-10

## 2024-04-10 RX ORDER — DULAGLUTIDE 3 MG/.5ML
3 INJECTION, SOLUTION SUBCUTANEOUS WEEKLY
Qty: 12 ADJUSTABLE DOSE PRE-FILLED PEN SYRINGE | Refills: 3 | Status: SHIPPED | OUTPATIENT
Start: 2024-04-10

## 2024-04-10 NOTE — PROGRESS NOTES
MELISSA Ron MD, [facility] ENDOCRINOLOGY   AND   THYROID NODULE CLINIC            Reason for visit: Follow-up of diabetes, thyroid cancer, and hypothyroidism      ASSESSMENT AND PLAN:    1. Type 2 diabetes mellitus with hyperglycemia, with long-term current use of insulin (HCC)  Glycemic control is suboptimal.  I will increase his Tresiba doses as below.  He will continue Trulicity and Farxiga as prescribed.  I will help him obtain a continuous glucose monitor for personal use.  - TRESIBA FLEXTOUCH 200 UNIT/ML SOPN; Inject 120 Units into the skin in the morning and at bedtime  Dispense: 90 mL; Refill: 3  - TRULICITY 3 MG/0.5ML SOPN; Inject 3 mg into the skin once a week  Dispense: 12 Adjustable Dose Pre-filled Pen Syringe; Refill: 3  - FARXIGA 10 MG tablet; Take 1 tablet by mouth every morning  Dispense: 90 tablet; Refill: 3  - Continuous Blood Gluc Sensor (DEXCOM G7 SENSOR) MISC; Use as directed (change every 10 days)  Dispense: 9 each; Refill: 3  - Comprehensive Metabolic Panel; Future  - Microalbumin / Creatinine Urine Ratio; Future  - Hemoglobin A1C; Future    2. History of thyroid cancer  He has a history of thyroid cancer.  I suspect that he had incidental microscopic papillary carcinoma but am not certain about that.  Thyroglobulin was undetectable in April 2023.  - Thyroglobulin Ab and Thyroglobulin, BREONNA or RAMAN; Future    3. Postsurgical hypothyroidism  TSH is at target.  - levothyroxine (SYNTHROID) 125 MCG tablet; Take 2 tablets by mouth every morning (before breakfast)  Dispense: 180 tablet; Refill: 3  - TSH; Future  - T4, Free; Future    4. Chronic coronary artery disease  - TRULICITY 3 MG/0.5ML SOPN; Inject 3 mg into the skin once a week  Dispense: 12 Adjustable Dose Pre-filled Pen Syringe; Refill: 3  - FARXIGA 10 MG tablet; Take 1 tablet by mouth every morning  Dispense: 90 tablet; Refill: 3    5. Hypercholesterolemia  - Lipid Panel; Future    6. Essential hypertension      Follow-up

## 2024-04-15 ENCOUNTER — PATIENT MESSAGE (OUTPATIENT)
Dept: NEUROSURGERY | Age: 59
End: 2024-04-15

## 2024-04-15 DIAGNOSIS — M43.17 SPONDYLOLISTHESIS, LUMBOSACRAL REGION: Primary | ICD-10-CM

## 2024-04-16 RX ORDER — MELOXICAM 15 MG/1
15 TABLET ORAL DAILY
Qty: 30 TABLET | Refills: 1 | Status: SHIPPED | OUTPATIENT
Start: 2024-04-16

## 2024-04-16 NOTE — TELEPHONE ENCOUNTER
From: Curt Santos  To: Dr. Juancarlos Burroughs  Sent: 4/15/2024 5:04 PM EDT  Subject: Meloxicam     I have not seen you in a while, but I need a new prescription for meloxicam. Also, I am still having some pain in my lower back. Some good days some bad. The pain radiates down the right leg, and the left leg goes almost completely numb. Do you think I need to come in and be seen. Dr. Burroughs did mention last time I saw him that if I was still having trouble that he may want to do an MRI to see if there is a deeper issue or just inflimation. I do not have a primary care physician right now.

## 2024-04-24 ENCOUNTER — TELEPHONE (OUTPATIENT)
Dept: NEUROSURGERY | Age: 59
End: 2024-04-24

## 2024-05-02 ENCOUNTER — OFFICE VISIT (OUTPATIENT)
Dept: NEUROSURGERY | Age: 59
End: 2024-05-02
Payer: COMMERCIAL

## 2024-05-02 ENCOUNTER — TELEPHONE (OUTPATIENT)
Dept: ENDOCRINOLOGY | Age: 59
End: 2024-05-02

## 2024-05-02 VITALS
BODY MASS INDEX: 42.66 KG/M2 | DIASTOLIC BLOOD PRESSURE: 90 MMHG | HEART RATE: 79 BPM | OXYGEN SATURATION: 95 % | HEIGHT: 72 IN | WEIGHT: 315 LBS | TEMPERATURE: 98.4 F | SYSTOLIC BLOOD PRESSURE: 159 MMHG

## 2024-05-02 DIAGNOSIS — M54.16 LUMBAR RADICULOPATHY: ICD-10-CM

## 2024-05-02 DIAGNOSIS — M54.50 CHRONIC BILATERAL LOW BACK PAIN WITHOUT SCIATICA: Primary | ICD-10-CM

## 2024-05-02 DIAGNOSIS — G89.29 CHRONIC BILATERAL LOW BACK PAIN WITHOUT SCIATICA: Primary | ICD-10-CM

## 2024-05-02 PROCEDURE — 3077F SYST BP >= 140 MM HG: CPT | Performed by: NURSE PRACTITIONER

## 2024-05-02 PROCEDURE — 3080F DIAST BP >= 90 MM HG: CPT | Performed by: NURSE PRACTITIONER

## 2024-05-02 PROCEDURE — 99202 OFFICE O/P NEW SF 15 MIN: CPT | Performed by: NURSE PRACTITIONER

## 2024-05-02 NOTE — PROGRESS NOTES
[facility] NOTE:   History of Present Illness:    CC: Low back and right leg pain    Curt Santos is a 58 y.o. male here to be evaluated for low back and right leg pain.  Patient states that he previously had an L5-S1 TLIF with Dr. Manjeet keys in 2021.  Patient states that he gets pain across his low back starting on the right side that migrates over to the left side.  Patient notes he also has pain that radiates down the backside of his right leg stopping about the heel.  Patient states the intensity and his pain varies but the pain itself is constant.  Patient notes that the weather does seem to affect the intensity of the back pain he experiences.  Patient states he is taking meloxicam with no real noticeable symptom improvement.  Patient states he has tried supplementing with ibuprofen and lidocaine patches but does not feel like this is been beneficial.  Patient states the only thing that seems to help ease his pain is a super hot shower.  Patient's wife states that sometimes you can notice leg cramps that become debilitating when the pain is intense.  Patient states this has been ongoing for about 2 years.  Patient is a diabetic with a current A1c of 9.3.  Patient is informed that he would have to have his A1c below is 7.9 in order to undergo a possible surgical alternative.    Past Medical History:   Diagnosis Date    Allergic rhinitis     Coronary artery disease     COVID-19 03/25/2021    Erectile dysfunction     Essential hypertension     GERD (gastroesophageal reflux disease)     Hypercholesterolemia     Lung nodule     Morbid obesity (HCC)     Obstructive sleep apnea on CPAP     Postsurgical hypothyroidism     Thyroid cancer (HCC)     Type 2 diabetes mellitus (HCC)       Past Surgical History:   Procedure Laterality Date    CARDIAC CATHETERIZATION  2020    pt. had 5 bypass    CHOLECYSTECTOMY      CORONARY ARTERY BYPASS GRAFT  02/12/2020    LUMBAR FUSION

## 2024-05-02 NOTE — TELEPHONE ENCOUNTER
PA for dexcom denied due to the following reasons    You are not (or will not be) taking it, and B) You do not have a  medical reason not to take it. We reviewed the information we had. Your request has been denied.  Your doctor can send us any new or missing information for us to review.    It did mention about taking more then 3 insulin injections a day. Could be the real reason for denial. How do you wish to proceed?

## 2024-05-09 DIAGNOSIS — M43.17 SPONDYLOLISTHESIS, LUMBOSACRAL REGION: Primary | ICD-10-CM

## 2024-05-09 DIAGNOSIS — M54.16 LUMBAR RADICULOPATHY: ICD-10-CM

## 2024-05-09 DIAGNOSIS — M54.50 CHRONIC BILATERAL LOW BACK PAIN WITHOUT SCIATICA: ICD-10-CM

## 2024-05-09 DIAGNOSIS — G89.29 CHRONIC BILATERAL LOW BACK PAIN WITHOUT SCIATICA: ICD-10-CM

## 2024-06-10 ENCOUNTER — PATIENT MESSAGE (OUTPATIENT)
Dept: ENDOCRINOLOGY | Age: 59
End: 2024-06-10

## 2024-06-11 RX ORDER — PROCHLORPERAZINE 25 MG/1
SUPPOSITORY RECTAL
Qty: 9 EACH | Refills: 3 | Status: SHIPPED | OUTPATIENT
Start: 2024-06-11

## 2024-06-11 RX ORDER — PROCHLORPERAZINE 25 MG/1
SUPPOSITORY RECTAL
Qty: 1 EACH | Refills: 3 | Status: SHIPPED | OUTPATIENT
Start: 2024-06-11

## 2024-06-11 NOTE — TELEPHONE ENCOUNTER
From: Curt Santos  To: Dr. Curt Ron  Sent: 6/10/2024 5:25 PM EDT  Subject: Dexcom CGM    I have a question. Can we try getting insurance to approve the Dexcom G6 sensors? I do not need the scanner. I can use my phone. I have a friend at work who uses the Dexcom G6 and has no issues with insurance covering them. She does not take any insulin at all. Just asking. Thanks

## 2024-06-12 DIAGNOSIS — M43.17 SPONDYLOLISTHESIS, LUMBOSACRAL REGION: ICD-10-CM

## 2024-06-12 RX ORDER — MELOXICAM 15 MG/1
15 TABLET ORAL DAILY
Qty: 30 TABLET | Refills: 5 | Status: SHIPPED | OUTPATIENT
Start: 2024-06-12

## 2024-06-17 RX ORDER — MELOXICAM 15 MG/1
15 TABLET ORAL DAILY
Qty: 30 TABLET | Refills: 1 | OUTPATIENT
Start: 2024-06-17

## 2024-06-24 ENCOUNTER — OFFICE VISIT (OUTPATIENT)
Dept: NEUROSURGERY | Age: 59
End: 2024-06-24
Payer: COMMERCIAL

## 2024-06-24 VITALS
DIASTOLIC BLOOD PRESSURE: 82 MMHG | BODY MASS INDEX: 42.66 KG/M2 | SYSTOLIC BLOOD PRESSURE: 157 MMHG | WEIGHT: 315 LBS | HEIGHT: 72 IN | TEMPERATURE: 98.1 F | OXYGEN SATURATION: 97 % | HEART RATE: 74 BPM

## 2024-06-24 DIAGNOSIS — M54.16 LUMBAR RADICULOPATHY: Primary | ICD-10-CM

## 2024-06-24 DIAGNOSIS — E66.01 MORBID (SEVERE) OBESITY DUE TO EXCESS CALORIES (HCC): ICD-10-CM

## 2024-06-24 DIAGNOSIS — Z98.1 HISTORY OF LUMBAR FUSION: ICD-10-CM

## 2024-06-24 PROCEDURE — 3077F SYST BP >= 140 MM HG: CPT | Performed by: NURSE PRACTITIONER

## 2024-06-24 PROCEDURE — 3079F DIAST BP 80-89 MM HG: CPT | Performed by: NURSE PRACTITIONER

## 2024-06-24 PROCEDURE — 99213 OFFICE O/P EST LOW 20 MIN: CPT | Performed by: NURSE PRACTITIONER

## 2024-06-24 RX ORDER — LOSARTAN POTASSIUM 50 MG/1
50 TABLET ORAL DAILY
COMMUNITY
Start: 2024-06-03 | End: 2025-06-03

## 2024-06-24 NOTE — PROGRESS NOTES
TABLET BY MOUTH EVERY DAY (Patient not taking: Reported on 6/24/2024) 30 tablet 0    fenofibrate (TRIGLIDE) 160 MG tablet Take 1 tablet by mouth daily 90 tablet 3    atorvastatin (LIPITOR) 80 MG tablet Take 1 tablet by mouth daily 90 tablet 3    metoprolol tartrate (LOPRESSOR) 25 MG tablet Take 1 tablet by mouth 2 times daily 180 tablet 3    esomeprazole (NEXIUM) 40 MG delayed release capsule TAKE 1 CAPSULE BY MOUTH TWO (2) TIMES A DAY. 180 capsule 3    acetaminophen (TYLENOL) 500 MG tablet Take 1 tablet by mouth as needed      aspirin 81 MG EC tablet Take by mouth      cetirizine (ZYRTEC) 10 MG tablet Take 1 tablet by mouth as needed       No current facility-administered medications for this visit.     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   Cardiovascular:  No chest pain/angina pectoris, palpitations, swelling of feet/ankles/hands, or calf pain while walking.     Respiratory: No chronic or frequent coughs, spitting up blood, shortness of breath, No asthma, or wheezing.     Gastrointestinal: No a bdominal pain,

## 2024-07-12 DIAGNOSIS — E78.00 HYPERCHOLESTEROLEMIA: ICD-10-CM

## 2024-07-12 DIAGNOSIS — I10 ESSENTIAL HYPERTENSION: ICD-10-CM

## 2024-07-12 RX ORDER — FENOFIBRATE 160 MG/1
160 TABLET ORAL DAILY
Qty: 90 TABLET | Refills: 3 | OUTPATIENT
Start: 2024-07-12

## 2024-07-12 RX ORDER — METOPROLOL TARTRATE 25 MG/1
25 TABLET, FILM COATED ORAL 2 TIMES DAILY
Qty: 180 TABLET | Refills: 3 | OUTPATIENT
Start: 2024-07-12

## 2024-07-12 RX ORDER — ATORVASTATIN CALCIUM 80 MG/1
80 TABLET, FILM COATED ORAL DAILY
Qty: 90 TABLET | Refills: 3 | OUTPATIENT
Start: 2024-07-12

## 2024-07-15 ENCOUNTER — PATIENT MESSAGE (OUTPATIENT)
Dept: ENDOCRINOLOGY | Age: 59
End: 2024-07-15

## 2024-07-15 DIAGNOSIS — E78.00 HYPERCHOLESTEROLEMIA: ICD-10-CM

## 2024-07-18 RX ORDER — FENOFIBRATE 160 MG/1
160 TABLET ORAL DAILY
Qty: 90 TABLET | Refills: 3 | Status: SHIPPED | OUTPATIENT
Start: 2024-07-18

## 2024-08-10 DIAGNOSIS — Z85.850 HISTORY OF THYROID CANCER: ICD-10-CM

## 2024-08-10 DIAGNOSIS — E89.0 POSTSURGICAL HYPOTHYROIDISM: ICD-10-CM

## 2024-08-10 DIAGNOSIS — E11.65 TYPE 2 DIABETES MELLITUS WITH HYPERGLYCEMIA, WITH LONG-TERM CURRENT USE OF INSULIN (HCC): ICD-10-CM

## 2024-08-10 DIAGNOSIS — Z79.4 TYPE 2 DIABETES MELLITUS WITH HYPERGLYCEMIA, WITH LONG-TERM CURRENT USE OF INSULIN (HCC): ICD-10-CM

## 2024-08-10 DIAGNOSIS — E78.00 HYPERCHOLESTEROLEMIA: ICD-10-CM

## 2024-08-29 ENCOUNTER — OFFICE VISIT (OUTPATIENT)
Dept: NEUROSURGERY | Age: 59
End: 2024-08-29
Payer: COMMERCIAL

## 2024-08-29 VITALS
DIASTOLIC BLOOD PRESSURE: 88 MMHG | TEMPERATURE: 97.6 F | WEIGHT: 315 LBS | BODY MASS INDEX: 42.66 KG/M2 | HEIGHT: 72 IN | OXYGEN SATURATION: 94 % | SYSTOLIC BLOOD PRESSURE: 165 MMHG | HEART RATE: 83 BPM

## 2024-08-29 DIAGNOSIS — E66.01 CLASS 3 SEVERE OBESITY WITH BODY MASS INDEX (BMI) OF 40.0 TO 44.9 IN ADULT, UNSPECIFIED OBESITY TYPE, UNSPECIFIED WHETHER SERIOUS COMORBIDITY PRESENT (HCC): ICD-10-CM

## 2024-08-29 DIAGNOSIS — M54.41 CHRONIC BILATERAL LOW BACK PAIN WITH BILATERAL SCIATICA: Primary | ICD-10-CM

## 2024-08-29 DIAGNOSIS — G89.29 CHRONIC BILATERAL LOW BACK PAIN WITH BILATERAL SCIATICA: Primary | ICD-10-CM

## 2024-08-29 DIAGNOSIS — M54.42 CHRONIC BILATERAL LOW BACK PAIN WITH BILATERAL SCIATICA: Primary | ICD-10-CM

## 2024-08-29 PROCEDURE — 3079F DIAST BP 80-89 MM HG: CPT | Performed by: NURSE PRACTITIONER

## 2024-08-29 PROCEDURE — 3077F SYST BP >= 140 MM HG: CPT | Performed by: NURSE PRACTITIONER

## 2024-08-29 PROCEDURE — 99213 OFFICE O/P EST LOW 20 MIN: CPT | Performed by: NURSE PRACTITIONER

## 2024-08-29 NOTE — PROGRESS NOTES
[facility] NOTE:   History of Present Illness:    CC: Lumbar MRI review    Curt Santos is a 59 y.o. male here to follow-up on his lumbar MRI.  Patient is still having pain across his low back that radiates down the back sides of his legs into his heels.  At last visit patient was only experiencing this pain down the back of the right leg but now he is experiencing it down the back of both.  Patient states that after slipping out of his truck he also started experiencing a lot more back pain.  Patient did have this fall before he had the new lumbar MRI.  Patient has previously had an L5-S1 fusion with Dr. Burroughs.  The lumbar MRI reveals L5-S1 fusion as well as moderate L2-3 central canal narrowing.  At this time given the patient's current BMI and his elevated hemoglobin A1c he is not currently a surgical candidate.  I have discussed with the patient that he needs to have a BMI of at least 42 or less and needs to have his hemoglobin A1c below 7.9 to become a surgical candidate.    Past Medical History:   Diagnosis Date    Allergic rhinitis     Coronary artery disease     COVID-19 03/25/2021    Erectile dysfunction     Essential hypertension     GERD (gastroesophageal reflux disease)     Hypercholesterolemia     Lung nodule     Morbid obesity (HCC)     Obstructive sleep apnea on CPAP     Postsurgical hypothyroidism     Thyroid cancer (HCC)     Type 2 diabetes mellitus (HCC)       Past Surgical History:   Procedure Laterality Date    CARDIAC CATHETERIZATION  2020    pt. had 5 bypass    CHOLECYSTECTOMY      CORONARY ARTERY BYPASS GRAFT  02/12/2020    LUMBAR FUSION  05/12/2021    L5-S1 TLIF Dr. Burroughs    ORTHOPEDIC SURGERY Right     bicep tendon repair    THYROIDECTOMY  2011    TONSILLECTOMY      UVULOPALATOPHARYGOPLASTY  01/01/2001     No Known Allergies   Family History   Problem Relation Age of Onset    Thyroid Disease Neg Hx     Thyroid Cancer Neg Hx     Diabetes Father

## 2024-09-07 LAB
ALBUMIN SERPL-MCNC: 4.1 G/DL (ref 3.8–4.9)
ALP SERPL-CCNC: 67 IU/L (ref 44–121)
ALT SERPL-CCNC: 27 IU/L (ref 0–44)
AST SERPL-CCNC: 28 IU/L (ref 0–40)
BILIRUB SERPL-MCNC: 0.7 MG/DL (ref 0–1.2)
BUN SERPL-MCNC: 14 MG/DL (ref 6–24)
BUN/CREAT SERPL: 15 (ref 9–20)
CALCIUM SERPL-MCNC: 8.8 MG/DL (ref 8.7–10.2)
CHLORIDE SERPL-SCNC: 105 MMOL/L (ref 96–106)
CHOLEST SERPL-MCNC: 130 MG/DL (ref 100–199)
CO2 SERPL-SCNC: 22 MMOL/L (ref 20–29)
CREAT SERPL-MCNC: 0.91 MG/DL (ref 0.76–1.27)
EGFRCR SERPLBLD CKD-EPI 2021: 98 ML/MIN/1.73
GLOBULIN SER CALC-MCNC: 2.1 G/DL (ref 1.5–4.5)
GLUCOSE SERPL-MCNC: 180 MG/DL (ref 70–99)
HBA1C MFR BLD: 7.9 % (ref 4.8–5.6)
HDLC SERPL-MCNC: 35 MG/DL
LDLC SERPL CALC-MCNC: 69 MG/DL (ref 0–99)
POTASSIUM SERPL-SCNC: 4.2 MMOL/L (ref 3.5–5.2)
PROT SERPL-MCNC: 6.2 G/DL (ref 6–8.5)
SODIUM SERPL-SCNC: 142 MMOL/L (ref 134–144)
T4 FREE SERPL-MCNC: 1.42 NG/DL (ref 0.82–1.77)
TRIGL SERPL-MCNC: 146 MG/DL (ref 0–149)
TSH SERPL DL<=0.005 MIU/L-ACNC: 2.58 UIU/ML (ref 0.45–4.5)
VLDLC SERPL CALC-MCNC: 26 MG/DL (ref 5–40)

## 2024-09-08 LAB
ALBUMIN/CREAT UR: 12 MG/G CREAT (ref 0–29)
CREAT UR-MCNC: 166.9 MG/DL
MICROALBUMIN UR-MCNC: 20.6 UG/ML
THYROGLOB AB SERPL-ACNC: <1 IU/ML (ref 0–0.9)
THYROGLOB SERPL-MCNC: <0.1 NG/ML (ref 1.4–29.2)

## 2024-09-11 ENCOUNTER — PATIENT MESSAGE (OUTPATIENT)
Dept: NEUROSURGERY | Age: 59
End: 2024-09-11

## 2024-09-11 ENCOUNTER — OFFICE VISIT (OUTPATIENT)
Dept: ENDOCRINOLOGY | Age: 59
End: 2024-09-11
Payer: COMMERCIAL

## 2024-09-11 VITALS
BODY MASS INDEX: 45.16 KG/M2 | SYSTOLIC BLOOD PRESSURE: 122 MMHG | HEART RATE: 84 BPM | DIASTOLIC BLOOD PRESSURE: 82 MMHG | OXYGEN SATURATION: 97 % | WEIGHT: 315 LBS

## 2024-09-11 DIAGNOSIS — I25.10 CHRONIC CORONARY ARTERY DISEASE: ICD-10-CM

## 2024-09-11 DIAGNOSIS — E89.0 POSTSURGICAL HYPOTHYROIDISM: ICD-10-CM

## 2024-09-11 DIAGNOSIS — I10 ESSENTIAL HYPERTENSION: ICD-10-CM

## 2024-09-11 DIAGNOSIS — E11.65 TYPE 2 DIABETES MELLITUS WITH HYPERGLYCEMIA, WITH LONG-TERM CURRENT USE OF INSULIN (HCC): Primary | ICD-10-CM

## 2024-09-11 DIAGNOSIS — Z85.850 HISTORY OF THYROID CANCER: ICD-10-CM

## 2024-09-11 DIAGNOSIS — Z79.4 TYPE 2 DIABETES MELLITUS WITH HYPERGLYCEMIA, WITH LONG-TERM CURRENT USE OF INSULIN (HCC): Primary | ICD-10-CM

## 2024-09-11 DIAGNOSIS — E78.00 HYPERCHOLESTEROLEMIA: ICD-10-CM

## 2024-09-11 PROCEDURE — 3051F HG A1C>EQUAL 7.0%<8.0%: CPT | Performed by: INTERNAL MEDICINE

## 2024-09-11 PROCEDURE — 3079F DIAST BP 80-89 MM HG: CPT | Performed by: INTERNAL MEDICINE

## 2024-09-11 PROCEDURE — 3074F SYST BP LT 130 MM HG: CPT | Performed by: INTERNAL MEDICINE

## 2024-09-11 PROCEDURE — 99214 OFFICE O/P EST MOD 30 MIN: CPT | Performed by: INTERNAL MEDICINE

## 2024-09-11 RX ORDER — LOSARTAN POTASSIUM 50 MG/1
50 TABLET ORAL DAILY
Qty: 90 TABLET | Refills: 3 | Status: SHIPPED | OUTPATIENT
Start: 2024-09-11 | End: 2025-09-11

## 2024-09-11 RX ORDER — METOPROLOL TARTRATE 25 MG/1
25 TABLET, FILM COATED ORAL 2 TIMES DAILY
Qty: 180 TABLET | Refills: 3 | Status: SHIPPED | OUTPATIENT
Start: 2024-09-11

## 2024-09-11 RX ORDER — INSULIN DEGLUDEC 200 U/ML
150 INJECTION, SOLUTION SUBCUTANEOUS 2 TIMES DAILY
Qty: 90 ML | Refills: 3 | Status: SHIPPED | OUTPATIENT
Start: 2024-09-11

## 2024-09-11 RX ORDER — FENOFIBRATE 160 MG/1
160 TABLET ORAL DAILY
Qty: 90 TABLET | Refills: 3 | Status: SHIPPED | OUTPATIENT
Start: 2024-09-11

## 2024-09-11 RX ORDER — LEVOTHYROXINE SODIUM 125 UG/1
250 TABLET ORAL
Qty: 180 TABLET | Refills: 3 | Status: SHIPPED | OUTPATIENT
Start: 2024-09-11

## 2024-09-11 RX ORDER — ATORVASTATIN CALCIUM 80 MG/1
80 TABLET, FILM COATED ORAL DAILY
Qty: 90 TABLET | Refills: 3 | Status: SHIPPED | OUTPATIENT
Start: 2024-09-11

## 2024-09-11 RX ORDER — DULAGLUTIDE 4.5 MG/.5ML
4.5 INJECTION, SOLUTION SUBCUTANEOUS WEEKLY
Qty: 6 ML | Refills: 3 | Status: SHIPPED | OUTPATIENT
Start: 2024-09-11

## 2024-09-11 RX ORDER — DAPAGLIFLOZIN 10 MG/1
10 TABLET, FILM COATED ORAL EVERY MORNING
Qty: 90 TABLET | Refills: 3 | Status: SHIPPED | OUTPATIENT
Start: 2024-09-11

## 2024-09-11 RX ORDER — INSULIN ASPART 100 [IU]/ML
INJECTION, SOLUTION INTRAVENOUS; SUBCUTANEOUS
Qty: 10 ADJUSTABLE DOSE PRE-FILLED PEN SYRINGE | Refills: 3 | Status: SHIPPED | OUTPATIENT
Start: 2024-09-11

## 2024-09-11 ASSESSMENT — ENCOUNTER SYMPTOMS: BACK PAIN: 1

## 2024-09-24 SDOH — HEALTH STABILITY: PHYSICAL HEALTH: ON AVERAGE, HOW MANY DAYS PER WEEK DO YOU ENGAGE IN MODERATE TO STRENUOUS EXERCISE (LIKE A BRISK WALK)?: 0 DAYS

## 2024-09-24 SDOH — HEALTH STABILITY: PHYSICAL HEALTH: ON AVERAGE, HOW MANY MINUTES DO YOU ENGAGE IN EXERCISE AT THIS LEVEL?: 0 MIN

## 2024-09-25 ENCOUNTER — OFFICE VISIT (OUTPATIENT)
Dept: FAMILY MEDICINE CLINIC | Facility: CLINIC | Age: 59
End: 2024-09-25
Payer: COMMERCIAL

## 2024-09-25 DIAGNOSIS — Z12.11 COLON CANCER SCREENING: ICD-10-CM

## 2024-09-25 DIAGNOSIS — Z76.89 ESTABLISHING CARE WITH NEW DOCTOR, ENCOUNTER FOR: ICD-10-CM

## 2024-09-25 DIAGNOSIS — Z13.31 DEPRESSION SCREEN: ICD-10-CM

## 2024-09-25 DIAGNOSIS — I10 ESSENTIAL HYPERTENSION: ICD-10-CM

## 2024-09-25 DIAGNOSIS — E66.01 CLASS 3 SEVERE OBESITY DUE TO EXCESS CALORIES WITH SERIOUS COMORBIDITY AND BODY MASS INDEX (BMI) OF 40.0 TO 44.9 IN ADULT: ICD-10-CM

## 2024-09-25 DIAGNOSIS — K21.9 GASTROESOPHAGEAL REFLUX DISEASE, UNSPECIFIED WHETHER ESOPHAGITIS PRESENT: Primary | ICD-10-CM

## 2024-09-25 PROBLEM — E66.813 CLASS 3 SEVERE OBESITY DUE TO EXCESS CALORIES WITH SERIOUS COMORBIDITY AND BODY MASS INDEX (BMI) OF 40.0 TO 44.9 IN ADULT: Status: ACTIVE | Noted: 2021-03-23

## 2024-09-25 LAB
BASOPHILS # BLD: 0.1 K/UL (ref 0–0.2)
BASOPHILS NFR BLD: 1 % (ref 0–2)
DIFFERENTIAL METHOD BLD: ABNORMAL
EOSINOPHIL # BLD: 0.2 K/UL (ref 0–0.8)
EOSINOPHIL NFR BLD: 2 % (ref 0.5–7.8)
ERYTHROCYTE [DISTWIDTH] IN BLOOD BY AUTOMATED COUNT: 13.1 % (ref 11.9–14.6)
HCT VFR BLD AUTO: 49.5 % (ref 41.1–50.3)
HGB BLD-MCNC: 16.1 G/DL (ref 13.6–17.2)
IMM GRANULOCYTES # BLD AUTO: 0 K/UL (ref 0–0.5)
IMM GRANULOCYTES NFR BLD AUTO: 1 % (ref 0–5)
LYMPHOCYTES # BLD: 2.1 K/UL (ref 0.5–4.6)
LYMPHOCYTES NFR BLD: 25 % (ref 13–44)
MCH RBC QN AUTO: 28.3 PG (ref 26.1–32.9)
MCHC RBC AUTO-ENTMCNC: 32.5 G/DL (ref 31.4–35)
MCV RBC AUTO: 87.1 FL (ref 82–102)
MONOCYTES # BLD: 0.6 K/UL (ref 0.1–1.3)
MONOCYTES NFR BLD: 7 % (ref 4–12)
NEUTS SEG # BLD: 5.4 K/UL (ref 1.7–8.2)
NEUTS SEG NFR BLD: 64 % (ref 43–78)
NRBC # BLD: 0 K/UL (ref 0–0.2)
PLATELET # BLD AUTO: 295 K/UL (ref 150–450)
PMV BLD AUTO: 11.3 FL (ref 9.4–12.3)
RBC # BLD AUTO: 5.68 M/UL (ref 4.23–5.6)
WBC # BLD AUTO: 8.4 K/UL (ref 4.3–11.1)

## 2024-09-25 PROCEDURE — 3077F SYST BP >= 140 MM HG: CPT

## 2024-09-25 PROCEDURE — 99213 OFFICE O/P EST LOW 20 MIN: CPT

## 2024-09-25 PROCEDURE — 3079F DIAST BP 80-89 MM HG: CPT

## 2024-09-25 RX ORDER — ESOMEPRAZOLE MAGNESIUM 40 MG/1
40 CAPSULE, DELAYED RELEASE ORAL 2 TIMES DAILY
Qty: 180 CAPSULE | Refills: 3 | Status: SHIPPED | OUTPATIENT
Start: 2024-09-25

## 2024-09-25 SDOH — ECONOMIC STABILITY: FOOD INSECURITY: WITHIN THE PAST 12 MONTHS, THE FOOD YOU BOUGHT JUST DIDN'T LAST AND YOU DIDN'T HAVE MONEY TO GET MORE.: NEVER TRUE

## 2024-09-25 SDOH — ECONOMIC STABILITY: INCOME INSECURITY: HOW HARD IS IT FOR YOU TO PAY FOR THE VERY BASICS LIKE FOOD, HOUSING, MEDICAL CARE, AND HEATING?: SOMEWHAT HARD

## 2024-09-25 SDOH — ECONOMIC STABILITY: FOOD INSECURITY: WITHIN THE PAST 12 MONTHS, YOU WORRIED THAT YOUR FOOD WOULD RUN OUT BEFORE YOU GOT MONEY TO BUY MORE.: NEVER TRUE

## 2024-09-25 ASSESSMENT — PATIENT HEALTH QUESTIONNAIRE - PHQ9
SUM OF ALL RESPONSES TO PHQ9 QUESTIONS 1 & 2: 0
2. FEELING DOWN, DEPRESSED OR HOPELESS: NOT AT ALL
SUM OF ALL RESPONSES TO PHQ QUESTIONS 1-9: 0
1. LITTLE INTEREST OR PLEASURE IN DOING THINGS: NOT AT ALL
SUM OF ALL RESPONSES TO PHQ QUESTIONS 1-9: 0

## 2024-09-26 VITALS
BODY MASS INDEX: 42.66 KG/M2 | OXYGEN SATURATION: 95 % | DIASTOLIC BLOOD PRESSURE: 82 MMHG | TEMPERATURE: 98.1 F | SYSTOLIC BLOOD PRESSURE: 144 MMHG | WEIGHT: 315 LBS | HEART RATE: 86 BPM | HEIGHT: 72 IN

## 2025-01-06 ENCOUNTER — PATIENT MESSAGE (OUTPATIENT)
Dept: ENDOCRINOLOGY | Age: 60
End: 2025-01-06

## 2025-01-06 DIAGNOSIS — E11.65 TYPE 2 DIABETES MELLITUS WITH HYPERGLYCEMIA, WITH LONG-TERM CURRENT USE OF INSULIN (HCC): ICD-10-CM

## 2025-01-06 DIAGNOSIS — Z79.4 TYPE 2 DIABETES MELLITUS WITH HYPERGLYCEMIA, WITH LONG-TERM CURRENT USE OF INSULIN (HCC): ICD-10-CM

## 2025-01-07 RX ORDER — INSULIN DEGLUDEC 200 U/ML
170 INJECTION, SOLUTION SUBCUTANEOUS 2 TIMES DAILY
Qty: 90 ML | Refills: 3 | Status: SHIPPED | OUTPATIENT
Start: 2025-01-07

## 2025-01-07 RX ORDER — INSULIN ASPART 100 [IU]/ML
INJECTION, SOLUTION INTRAVENOUS; SUBCUTANEOUS
Qty: 135 ADJUSTABLE DOSE PRE-FILLED PEN SYRINGE | Refills: 3 | Status: SHIPPED | OUTPATIENT
Start: 2025-01-07

## 2025-01-07 RX ORDER — INSULIN DEGLUDEC 200 U/ML
170 INJECTION, SOLUTION SUBCUTANEOUS 2 TIMES DAILY
Qty: 90 ML | Refills: 3 | Status: SHIPPED | OUTPATIENT
Start: 2025-01-07 | End: 2025-01-07 | Stop reason: SDUPTHER

## 2025-01-07 RX ORDER — INSULIN ASPART 100 [IU]/ML
INJECTION, SOLUTION INTRAVENOUS; SUBCUTANEOUS
Qty: 45 ADJUSTABLE DOSE PRE-FILLED PEN SYRINGE | Refills: 3 | Status: SHIPPED | OUTPATIENT
Start: 2025-01-07 | End: 2025-01-07 | Stop reason: SDUPTHER

## 2025-01-30 ENCOUNTER — PATIENT MESSAGE (OUTPATIENT)
Dept: ENDOCRINOLOGY | Age: 60
End: 2025-01-30

## 2025-03-12 ENCOUNTER — OFFICE VISIT (OUTPATIENT)
Dept: ENDOCRINOLOGY | Age: 60
End: 2025-03-12
Payer: COMMERCIAL

## 2025-03-12 VITALS
WEIGHT: 315 LBS | SYSTOLIC BLOOD PRESSURE: 123 MMHG | DIASTOLIC BLOOD PRESSURE: 80 MMHG | BODY MASS INDEX: 42.66 KG/M2 | HEART RATE: 78 BPM | OXYGEN SATURATION: 98 % | HEIGHT: 72 IN

## 2025-03-12 DIAGNOSIS — E11.65 TYPE 2 DIABETES MELLITUS WITH HYPERGLYCEMIA, WITH LONG-TERM CURRENT USE OF INSULIN (HCC): Primary | ICD-10-CM

## 2025-03-12 DIAGNOSIS — I25.10 CHRONIC CORONARY ARTERY DISEASE: ICD-10-CM

## 2025-03-12 DIAGNOSIS — Z79.4 TYPE 2 DIABETES MELLITUS WITH HYPERGLYCEMIA, WITH LONG-TERM CURRENT USE OF INSULIN (HCC): Primary | ICD-10-CM

## 2025-03-12 DIAGNOSIS — I10 ESSENTIAL HYPERTENSION: ICD-10-CM

## 2025-03-12 DIAGNOSIS — E89.0 POSTSURGICAL HYPOTHYROIDISM: ICD-10-CM

## 2025-03-12 DIAGNOSIS — E78.00 HYPERCHOLESTEROLEMIA: ICD-10-CM

## 2025-03-12 DIAGNOSIS — Z85.850 HISTORY OF THYROID CANCER: ICD-10-CM

## 2025-03-12 LAB — HBA1C MFR BLD: 7 %

## 2025-03-12 PROCEDURE — 3051F HG A1C>EQUAL 7.0%<8.0%: CPT | Performed by: INTERNAL MEDICINE

## 2025-03-12 PROCEDURE — 99214 OFFICE O/P EST MOD 30 MIN: CPT | Performed by: INTERNAL MEDICINE

## 2025-03-12 PROCEDURE — 95251 CONT GLUC MNTR ANALYSIS I&R: CPT | Performed by: INTERNAL MEDICINE

## 2025-03-12 PROCEDURE — 3079F DIAST BP 80-89 MM HG: CPT | Performed by: INTERNAL MEDICINE

## 2025-03-12 PROCEDURE — 3074F SYST BP LT 130 MM HG: CPT | Performed by: INTERNAL MEDICINE

## 2025-03-12 PROCEDURE — 83036 HEMOGLOBIN GLYCOSYLATED A1C: CPT | Performed by: INTERNAL MEDICINE

## 2025-03-12 RX ORDER — ATORVASTATIN CALCIUM 80 MG/1
80 TABLET, FILM COATED ORAL DAILY
Qty: 90 TABLET | Refills: 3 | Status: SHIPPED | OUTPATIENT
Start: 2025-03-12

## 2025-03-12 RX ORDER — DAPAGLIFLOZIN 10 MG/1
10 TABLET, FILM COATED ORAL EVERY MORNING
Qty: 90 TABLET | Refills: 3 | Status: SHIPPED | OUTPATIENT
Start: 2025-03-12

## 2025-03-12 RX ORDER — METOPROLOL TARTRATE 25 MG/1
25 TABLET, FILM COATED ORAL 2 TIMES DAILY
Qty: 180 TABLET | Refills: 3 | Status: SHIPPED | OUTPATIENT
Start: 2025-03-12

## 2025-03-12 RX ORDER — INSULIN DEGLUDEC 200 U/ML
110 INJECTION, SOLUTION SUBCUTANEOUS 2 TIMES DAILY
Qty: 90 ML | Refills: 3 | Status: SHIPPED | OUTPATIENT
Start: 2025-03-12

## 2025-03-12 RX ORDER — INSULIN ASPART 100 [IU]/ML
INJECTION, SOLUTION INTRAVENOUS; SUBCUTANEOUS
Qty: 135 ADJUSTABLE DOSE PRE-FILLED PEN SYRINGE | Refills: 3 | Status: SHIPPED | OUTPATIENT
Start: 2025-03-12

## 2025-03-12 RX ORDER — ACYCLOVIR 400 MG/1
TABLET ORAL
Qty: 9 EACH | Refills: 3 | Status: SHIPPED | OUTPATIENT
Start: 2025-03-12

## 2025-03-12 RX ORDER — FENOFIBRATE 160 MG/1
160 TABLET ORAL DAILY
Qty: 90 TABLET | Refills: 3 | Status: SHIPPED | OUTPATIENT
Start: 2025-03-12

## 2025-03-12 RX ORDER — LEVOTHYROXINE SODIUM 125 UG/1
250 TABLET ORAL
Qty: 180 TABLET | Refills: 3 | Status: SHIPPED | OUTPATIENT
Start: 2025-03-12

## 2025-03-12 RX ORDER — DULAGLUTIDE 4.5 MG/.5ML
4.5 INJECTION, SOLUTION SUBCUTANEOUS WEEKLY
Qty: 6 ML | Refills: 3 | Status: SHIPPED | OUTPATIENT
Start: 2025-03-12

## 2025-03-12 RX ORDER — LOSARTAN POTASSIUM 50 MG/1
50 TABLET ORAL DAILY
Qty: 90 TABLET | Refills: 3 | Status: SHIPPED | OUTPATIENT
Start: 2025-03-12 | End: 2026-03-12

## 2025-03-12 NOTE — PROGRESS NOTES
MELISSA Ron MD, [facility] ENDOCRINOLOGY   AND   THYROID NODULE CLINIC            Reason for visit: Follow-up of diabetes, thyroid cancer, and hypothyroidism      ASSESSMENT AND PLAN:    1. Type 2 diabetes mellitus with hyperglycemia, with long-term current use of insulin (HCC)  Glycemic control is significantly improved.  No changes.  He will update labs prior to the next appointment with me.  Lab Results   Component Value Date/Time    [id number] 7.0 03/12/2025 10:55 AM     - AMB POC HEMOGLOBIN A1C  - TRESIBA FLEXTOUCH 200 UNIT/ML SOPN; Inject 110 Units into the skin in the morning and at bedtime  Dispense: 90 mL; Refill: 3  - NOVOLOG FLEXPEN 100 UNIT/ML injection pen; 25 units with meals plus 3 units per 50 >150 AC/HS (up to 150 units per day)  Dispense: 135 Adjustable Dose Pre-filled Pen Syringe; Refill: 3  - FARXIGA 10 MG tablet; Take 1 tablet by mouth every morning  Dispense: 90 tablet; Refill: 3  - TRULICITY 4.5 MG/0.5ML SOAJ; Inject 4.5 mg into the skin once a week  Dispense: 6 mL; Refill: 3  - Continuous Glucose Sensor (DEXCOM G7 SENSOR) MISC; Change every 10 days as directed  Dispense: 9 each; Refill: 3  - Comprehensive Metabolic Panel; Future  - Albumin/Creatinine Ratio, Urine; Future  - Hemoglobin A1C; Future  - GLUCOSE MONITOR, 72 HOUR, PHYS INTERP    2. History of thyroid cancer  I have no records related to his thyroid cancer, but no testing other than occasional thyroglobulin is necessary.  His most recent thyroglobulin was negative.  - Thyroglobulin Ab and Thyroglobulin, BREONNA or RAMAN; Future    3. Postsurgical hypothyroidism  He has been biochemically euthyroid on his current levothyroxine dose.  Continue it as prescribed.  Reassess in 6 months.  - levothyroxine (SYNTHROID) 125 MCG tablet; Take 2 tablets by mouth every morning (before breakfast)  Dispense: 180 tablet; Refill: 3  - TSH reflex to FT4; Future    4. Chronic coronary artery disease  - FARXIGA 10 MG tablet; Take 1 tablet by

## 2025-03-28 ENCOUNTER — PATIENT MESSAGE (OUTPATIENT)
Dept: ENDOCRINOLOGY | Age: 60
End: 2025-03-28

## 2025-03-28 DIAGNOSIS — Z79.4 TYPE 2 DIABETES MELLITUS WITH HYPERGLYCEMIA, WITH LONG-TERM CURRENT USE OF INSULIN (HCC): Primary | ICD-10-CM

## 2025-03-28 DIAGNOSIS — E11.65 TYPE 2 DIABETES MELLITUS WITH HYPERGLYCEMIA, WITH LONG-TERM CURRENT USE OF INSULIN (HCC): Primary | ICD-10-CM

## 2025-04-02 ENCOUNTER — OFFICE VISIT (OUTPATIENT)
Dept: FAMILY MEDICINE CLINIC | Facility: CLINIC | Age: 60
End: 2025-04-02
Payer: COMMERCIAL

## 2025-04-02 VITALS
SYSTOLIC BLOOD PRESSURE: 145 MMHG | OXYGEN SATURATION: 96 % | HEIGHT: 72 IN | BODY MASS INDEX: 42.66 KG/M2 | DIASTOLIC BLOOD PRESSURE: 74 MMHG | WEIGHT: 315 LBS | TEMPERATURE: 99.5 F | HEART RATE: 85 BPM

## 2025-04-02 DIAGNOSIS — Z12.11 SCREENING FOR COLON CANCER: ICD-10-CM

## 2025-04-02 DIAGNOSIS — K21.9 GASTROESOPHAGEAL REFLUX DISEASE, UNSPECIFIED WHETHER ESOPHAGITIS PRESENT: Primary | ICD-10-CM

## 2025-04-02 PROCEDURE — 3078F DIAST BP <80 MM HG: CPT

## 2025-04-02 PROCEDURE — 3077F SYST BP >= 140 MM HG: CPT

## 2025-04-02 PROCEDURE — 99213 OFFICE O/P EST LOW 20 MIN: CPT

## 2025-04-02 RX ORDER — FAMOTIDINE 40 MG/1
40 TABLET, FILM COATED ORAL EVERY EVENING
Qty: 30 TABLET | Refills: 3 | Status: SHIPPED | OUTPATIENT
Start: 2025-04-02 | End: 2025-04-02

## 2025-04-02 RX ORDER — ESOMEPRAZOLE MAGNESIUM 40 MG/1
40 CAPSULE, DELAYED RELEASE ORAL DAILY
Qty: 180 CAPSULE | Refills: 3 | Status: SHIPPED | OUTPATIENT
Start: 2025-04-02

## 2025-04-02 RX ORDER — FAMOTIDINE 40 MG/1
40 TABLET, FILM COATED ORAL NIGHTLY PRN
Qty: 30 TABLET | Refills: 3 | Status: SHIPPED | OUTPATIENT
Start: 2025-04-02

## 2025-04-02 SDOH — ECONOMIC STABILITY: TRANSPORTATION INSECURITY
IN THE PAST 12 MONTHS, HAS LACK OF TRANSPORTATION KEPT YOU FROM MEETINGS, WORK, OR FROM GETTING THINGS NEEDED FOR DAILY LIVING?: NO

## 2025-04-02 SDOH — ECONOMIC STABILITY: FOOD INSECURITY: WITHIN THE PAST 12 MONTHS, YOU WORRIED THAT YOUR FOOD WOULD RUN OUT BEFORE YOU GOT MONEY TO BUY MORE.: NEVER TRUE

## 2025-04-02 SDOH — ECONOMIC STABILITY: FOOD INSECURITY: WITHIN THE PAST 12 MONTHS, THE FOOD YOU BOUGHT JUST DIDN'T LAST AND YOU DIDN'T HAVE MONEY TO GET MORE.: NEVER TRUE

## 2025-04-02 SDOH — ECONOMIC STABILITY: TRANSPORTATION INSECURITY
IN THE PAST 12 MONTHS, HAS THE LACK OF TRANSPORTATION KEPT YOU FROM MEDICAL APPOINTMENTS OR FROM GETTING MEDICATIONS?: NO

## 2025-04-02 SDOH — ECONOMIC STABILITY: INCOME INSECURITY: IN THE LAST 12 MONTHS, WAS THERE A TIME WHEN YOU WERE NOT ABLE TO PAY THE MORTGAGE OR RENT ON TIME?: NO

## 2025-04-02 ASSESSMENT — PATIENT HEALTH QUESTIONNAIRE - PHQ9
1. LITTLE INTEREST OR PLEASURE IN DOING THINGS: NOT AT ALL
SUM OF ALL RESPONSES TO PHQ QUESTIONS 1-9: 0
1. LITTLE INTEREST OR PLEASURE IN DOING THINGS: NOT AT ALL
2. FEELING DOWN, DEPRESSED OR HOPELESS: NOT AT ALL
SUM OF ALL RESPONSES TO PHQ QUESTIONS 1-9: 0
SUM OF ALL RESPONSES TO PHQ9 QUESTIONS 1 & 2: 0
SUM OF ALL RESPONSES TO PHQ QUESTIONS 1-9: 0
2. FEELING DOWN, DEPRESSED OR HOPELESS: NOT AT ALL
SUM OF ALL RESPONSES TO PHQ QUESTIONS 1-9: 0

## 2025-04-02 NOTE — PROGRESS NOTES
[facility] [place] [address]  Phone: [phone] Fax [phone]  Harmony Rizo MD      Date of Service: 4/2/2025    Patient: Curt Santos  1965 59 y.o. male,Established patient, here for evaluation of the following chief complaint(s):      Chief complaint:   Chief Complaint   Patient presents with    Gastroesophageal Reflux     6 month follow up for GERD       HISTORY OF PRESENTING ILLNESS     Curt Santos is a 59 y.o. male presented to the clinic for a follow-up on GERD    History of Present Illness  \  He reports no current symptoms of heartburn, reflux, nausea, vomiting, or diarrhea. He has identified spicy foods as a trigger for his symptoms and has made dietary modifications to reduce his intake of such foods. He has not noticed any blood in his stools. He has been managing his condition with Nexium, typically taking one pill daily, but occasionally increasing the dosage to two pills when necessary. He does not recall a previous treatment with famotidine or Pepcid.    He saw his endocrinologist about a month ago. His hemoglobin A1c was below 7 for the first time ever. He attributes that to the Dexcom meter system he started using in 01/2025. It is giving him a more real-time feel for his numbers and reminds him what to eat and what not to eat. It gives him more control and it is working well for him. Dr. Ron has ordered a lot of other labs for his next visit including lipid panel, CBC, thyroid, hemoglobin A1c, and urine test. [facility]    Supplemental Information  He has back issues and shoulder problems, which are being treated. When it gets painful, he will take 2 ibuprofen and 2 Tylenol at the same time. He experiences severe pain 2 or 3 times a week.    MEDICATIONS  Nexium, ibuprofen, Tylenol      Past Medical History:      Diagnosis Date    Allergic rhinitis     Coronary artery disease

## 2025-04-02 NOTE — ASSESSMENT & PLAN NOTE
Chronic, at goal (stable), changes made today: Decrease frequency of Nexium to once daily and add Pepcid as needed, medication adherence emphasized, and lifestyle modifications recommended  He reports no current symptoms of heartburn, reflux, nausea, vomiting, or diarrhea. He has identified spicy foods as a trigger for his symptoms and has made dietary modifications to reduce his intake of such foods. He has been managing his condition with Nexium, typically taking one pill daily, but occasionally increasing the dosage to two pills when necessary. He was informed that excessive use of ibuprofen can potentially exacerbate heartburn and GERD symptoms and may also lead to ulcer formation. The treatment plan will be adjusted to include Nexium once daily, with Pepcid to be taken as needed.Anti-reflux measures such as raising the head of the bed, avoiding tight clothing or belts, avoiding eating late at night and not lying down shortly after mealtime are discussed.     Orders:    [facility]; Future    esomeprazole (NEXIUM) 40 MG delayed release capsule; Take 1 capsule by mouth daily    famotidine (PEPCID) 40 MG tablet; Take 1 tablet by mouth nightly as needed (GERD)

## 2025-04-08 ENCOUNTER — PATIENT MESSAGE (OUTPATIENT)
Dept: NEUROSURGERY | Age: 60
End: 2025-04-08

## 2025-04-16 ENCOUNTER — OFFICE VISIT (OUTPATIENT)
Dept: NEUROSURGERY | Age: 60
End: 2025-04-16
Payer: COMMERCIAL

## 2025-04-16 VITALS
DIASTOLIC BLOOD PRESSURE: 80 MMHG | OXYGEN SATURATION: 95 % | HEART RATE: 83 BPM | SYSTOLIC BLOOD PRESSURE: 160 MMHG | HEIGHT: 72 IN | TEMPERATURE: 97.9 F | BODY MASS INDEX: 42.66 KG/M2 | WEIGHT: 315 LBS

## 2025-04-16 DIAGNOSIS — E66.813 CLASS 3 SEVERE OBESITY WITH BODY MASS INDEX (BMI) OF 40.0 TO 44.9 IN ADULT, UNSPECIFIED OBESITY TYPE, UNSPECIFIED WHETHER SERIOUS COMORBIDITY PRESENT: ICD-10-CM

## 2025-04-16 DIAGNOSIS — M54.16 LUMBAR RADICULOPATHY: Primary | ICD-10-CM

## 2025-04-16 DIAGNOSIS — R29.818 NEUROGENIC CLAUDICATION: ICD-10-CM

## 2025-04-16 PROCEDURE — 3079F DIAST BP 80-89 MM HG: CPT

## 2025-04-16 PROCEDURE — 3077F SYST BP >= 140 MM HG: CPT

## 2025-04-16 PROCEDURE — 99214 OFFICE O/P EST MOD 30 MIN: CPT

## 2025-04-16 RX ORDER — GABAPENTIN 100 MG/1
300 CAPSULE ORAL 3 TIMES DAILY
Qty: 270 CAPSULE | Refills: 0 | Status: SHIPPED | OUTPATIENT
Start: 2025-04-16 | End: 2025-05-16

## 2025-04-16 NOTE — PROGRESS NOTES
[facility] NOTE:   History of Present Illness:    CC: Lower back pain, establish care    Curt Santos is a 59 y.o. male who presents today for evaluation of lower back pain with radiation to bilateral legs.  States that he has pain that radiates from his lower back to bilateral lower extremities on the posterior aspect, this radiates all the way down to his feet.  He also has pain at his back.  He is able to stand or walk for around 5 minutes before he starts having pain.  Currently this is worse on the left.  Although he often has pain down both legs.  He has had a L5-S1 TLIF by Dr. Burroughs in 2021.  He has been seeing pain management having injections every 3 months.  He had last injection around 3 weeks ago and does not feel that his pain was resolved.  He did complete physical therapy around 6 months ago and feels that this worsened his pain.  He has had continued pain since that time.  Was reevaluated in neurosurgery clinic in June 2024, was told he was not a good candidate for surgery due to a morbid obesity and uncontrolled diabetes.  Since that time his A1c was last to 7.9.  He is continue to wear Dexcom and feels that his blood sugars are better controlled.    He does have a significant cardiac history and is followed by [facility].  He states he had multiple CABGs.           Red Flag Signs Acute  (Days) Subacute  (Weeks) Chronic  (Months) Absent   Incontinence  []  []  []  [x]   Severe Weakness  []  []  []  [x]   Severe Sensory Loss  []  []  []  [x]   Gait Disturbance  []  []  []  [x]       Anticoagulant: ASA 81mg  Diabetes: 7.9% on 9/6/2024      Past Medical History:   Diagnosis Date    Allergic rhinitis     Coronary artery disease     COVID-19 03/25/2021    Erectile dysfunction     Essential hypertension     GERD (gastroesophageal reflux disease)     Hypercholesterolemia     Lung nodule     Morbid obesity     Obstructive sleep apnea on CPAP

## 2025-07-04 DIAGNOSIS — K21.9 GASTROESOPHAGEAL REFLUX DISEASE, UNSPECIFIED WHETHER ESOPHAGITIS PRESENT: ICD-10-CM

## 2025-07-07 DIAGNOSIS — M54.16 LUMBAR RADICULOPATHY: Primary | ICD-10-CM

## 2025-07-07 DIAGNOSIS — Z98.1 HISTORY OF LUMBAR FUSION: ICD-10-CM

## 2025-07-07 DIAGNOSIS — M43.17 SPONDYLOLISTHESIS, LUMBOSACRAL REGION: ICD-10-CM

## 2025-07-07 RX ORDER — FAMOTIDINE 40 MG/1
40 TABLET, FILM COATED ORAL EVERY EVENING
Qty: 90 TABLET | Refills: 1 | Status: SHIPPED | OUTPATIENT
Start: 2025-07-07

## 2025-07-12 DIAGNOSIS — Z79.4 TYPE 2 DIABETES MELLITUS WITH HYPERGLYCEMIA, WITH LONG-TERM CURRENT USE OF INSULIN (HCC): ICD-10-CM

## 2025-07-12 DIAGNOSIS — Z85.850 HISTORY OF THYROID CANCER: ICD-10-CM

## 2025-07-12 DIAGNOSIS — E89.0 POSTSURGICAL HYPOTHYROIDISM: ICD-10-CM

## 2025-07-12 DIAGNOSIS — E11.65 TYPE 2 DIABETES MELLITUS WITH HYPERGLYCEMIA, WITH LONG-TERM CURRENT USE OF INSULIN (HCC): ICD-10-CM

## 2025-07-12 DIAGNOSIS — E78.00 HYPERCHOLESTEROLEMIA: ICD-10-CM

## 2025-07-14 NOTE — PROGRESS NOTES
[facility]      Neurosurgery History and Physical Clinic Note         Patient Name: Curt Santos    Medical Record Number: [id number]    YOB: 1965    Date of Visit: 07/15/25         Visit Type: New Patient         CHIEF COMPLAINT:    Chief Complaint   Patient presents with    Other            HISTORY OF PRESENT ILLNESS:      History of Present Illness  I had the pleasure of meeting Curt Santos in the neurosurgical clinic today. Curt Santos is a pleasant 59 y.o. year young male who presents for back pain.    He is here to discuss the results of his recent MRI and x-rays due to persistent lower back pain radiating down both legs to the knees, extending to the right foot. This has been ongoing for 1-2 years. He reports a bulging disc between two vertebrae, exerting pressure on nerves, causing radiating pain. His last injection by Dr. Duke in [place] did not provide relief. Previous 30-day injections had some effect, but 3-month injections only provided relief for about 2 weeks.    He experiences difficulty moving for at least 3 days a week and often wakes up in pain. Relief is found from hot showers and sitting in a certain position in his recliner. His last injection was a few months ago. He occasionally experiences unsteadiness but can walk long distances without significant issues, though struggles to climb stairs after work. Most discomfort is due to back pain.    He has tried physical therapy and injections without alleviation. Gabapentin 300 mg causes drowsiness, making work difficult, so he takes it in the evenings or weekends, but it leaves him extremely tired. Pain worsens with exertion, such as yard work or prolonged standing. Fusion surgery at L5-S1 in 2021 initially improved his condition, but new symptoms have developed.    Past Surgical History:  Fusion surgery at L5-S1 in 2021. Coronary artery bypass surgery on

## 2025-07-15 ENCOUNTER — OFFICE VISIT (OUTPATIENT)
Dept: NEUROSURGERY | Age: 60
End: 2025-07-15
Payer: COMMERCIAL

## 2025-07-15 ENCOUNTER — HOSPITAL ENCOUNTER (OUTPATIENT)
Dept: GENERAL RADIOLOGY | Age: 60
Discharge: HOME | End: 2025-07-17
Payer: COMMERCIAL

## 2025-07-15 VITALS
HEART RATE: 80 BPM | OXYGEN SATURATION: 96 % | DIASTOLIC BLOOD PRESSURE: 79 MMHG | WEIGHT: 315 LBS | HEIGHT: 72 IN | SYSTOLIC BLOOD PRESSURE: 146 MMHG | BODY MASS INDEX: 42.66 KG/M2 | TEMPERATURE: 98 F

## 2025-07-15 DIAGNOSIS — M54.16 LUMBAR RADICULOPATHY: ICD-10-CM

## 2025-07-15 DIAGNOSIS — Z98.1 S/P LUMBAR SPINAL FUSION: ICD-10-CM

## 2025-07-15 DIAGNOSIS — M43.17 SPONDYLOLISTHESIS, LUMBOSACRAL REGION: ICD-10-CM

## 2025-07-15 DIAGNOSIS — M43.17 SPONDYLOLISTHESIS OF LUMBOSACRAL REGION: ICD-10-CM

## 2025-07-15 DIAGNOSIS — Z98.1 HISTORY OF LUMBAR FUSION: ICD-10-CM

## 2025-07-15 DIAGNOSIS — E66.813 CLASS 3 SEVERE OBESITY WITH BODY MASS INDEX (BMI) OF 40.0 TO 44.9 IN ADULT, UNSPECIFIED OBESITY TYPE, UNSPECIFIED WHETHER SERIOUS COMORBIDITY PRESENT (HCC): ICD-10-CM

## 2025-07-15 DIAGNOSIS — M47.9 SPONDYLOSIS: Primary | ICD-10-CM

## 2025-07-15 PROCEDURE — 3078F DIAST BP <80 MM HG: CPT | Performed by: STUDENT IN AN ORGANIZED HEALTH CARE EDUCATION/TRAINING PROGRAM

## 2025-07-15 PROCEDURE — 72120 X-RAY BEND ONLY L-S SPINE: CPT

## 2025-07-15 PROCEDURE — 3077F SYST BP >= 140 MM HG: CPT | Performed by: STUDENT IN AN ORGANIZED HEALTH CARE EDUCATION/TRAINING PROGRAM

## 2025-07-15 PROCEDURE — 99214 OFFICE O/P EST MOD 30 MIN: CPT | Performed by: STUDENT IN AN ORGANIZED HEALTH CARE EDUCATION/TRAINING PROGRAM

## 2025-07-16 ENCOUNTER — TELEPHONE (OUTPATIENT)
Dept: NEUROSURGERY | Age: 60
End: 2025-07-16

## 2025-07-16 NOTE — TELEPHONE ENCOUNTER
LVM on nurses line requesting injection record, left direct number, fax number, and my name if she had any questions.

## 2025-07-19 LAB
ALBUMIN SERPL-MCNC: 4.5 G/DL (ref 3.8–4.9)
ALBUMIN/CREAT UR: 6 MG/G CREAT (ref 0–29)
ALP SERPL-CCNC: 68 IU/L (ref 44–121)
ALT SERPL-CCNC: 25 IU/L (ref 0–44)
AST SERPL-CCNC: 27 IU/L (ref 0–40)
BILIRUB SERPL-MCNC: 0.8 MG/DL (ref 0–1.2)
BUN SERPL-MCNC: 24 MG/DL (ref 6–24)
BUN/CREAT SERPL: 25 (ref 9–20)
CALCIUM SERPL-MCNC: 9.5 MG/DL (ref 8.7–10.2)
CHLORIDE SERPL-SCNC: 104 MMOL/L (ref 96–106)
CHOLEST SERPL-MCNC: 126 MG/DL (ref 100–199)
CO2 SERPL-SCNC: 20 MMOL/L (ref 20–29)
CREAT SERPL-MCNC: 0.97 MG/DL (ref 0.76–1.27)
CREAT UR-MCNC: 66.2 MG/DL
EGFRCR SERPLBLD CKD-EPI 2021: 91 ML/MIN/1.73
GLOBULIN SER CALC-MCNC: 2.1 G/DL (ref 1.5–4.5)
GLUCOSE SERPL-MCNC: 98 MG/DL (ref 70–99)
HDLC SERPL-MCNC: 37 MG/DL
LDLC SERPL CALC-MCNC: 68 MG/DL (ref 0–99)
MICROALBUMIN UR-MCNC: 3.9 UG/ML
POTASSIUM SERPL-SCNC: 4.3 MMOL/L (ref 3.5–5.2)
PROT SERPL-MCNC: 6.6 G/DL (ref 6–8.5)
SODIUM SERPL-SCNC: 142 MMOL/L (ref 134–144)
TRIGL SERPL-MCNC: 115 MG/DL (ref 0–149)
TSH SERPL DL<=0.005 MIU/L-ACNC: 1.74 UIU/ML (ref 0.45–4.5)
VLDLC SERPL CALC-MCNC: 21 MG/DL (ref 5–40)

## 2025-07-20 LAB
THYROGLOB AB SERPL-ACNC: <1 IU/ML (ref 0–0.9)
THYROGLOB SERPL-MCNC: <0.1 NG/ML (ref 1.4–29.2)

## 2025-07-21 LAB
EST. AVERAGE GLUCOSE BLD GHB EST-MCNC: 143 MG/DL
HBA1C MFR BLD: 6.6 %HB

## 2025-07-23 ENCOUNTER — OFFICE VISIT (OUTPATIENT)
Dept: ENDOCRINOLOGY | Age: 60
End: 2025-07-23
Payer: COMMERCIAL

## 2025-07-23 VITALS
HEIGHT: 72 IN | WEIGHT: 315 LBS | BODY MASS INDEX: 42.66 KG/M2 | SYSTOLIC BLOOD PRESSURE: 160 MMHG | OXYGEN SATURATION: 96 % | DIASTOLIC BLOOD PRESSURE: 80 MMHG | HEART RATE: 76 BPM

## 2025-07-23 DIAGNOSIS — E78.00 HYPERCHOLESTEROLEMIA: ICD-10-CM

## 2025-07-23 DIAGNOSIS — E89.0 POSTSURGICAL HYPOTHYROIDISM: ICD-10-CM

## 2025-07-23 DIAGNOSIS — I10 ESSENTIAL HYPERTENSION: ICD-10-CM

## 2025-07-23 DIAGNOSIS — Z85.850 HISTORY OF THYROID CANCER: ICD-10-CM

## 2025-07-23 DIAGNOSIS — E11.65 TYPE 2 DIABETES MELLITUS WITH HYPERGLYCEMIA, WITH LONG-TERM CURRENT USE OF INSULIN (HCC): Primary | ICD-10-CM

## 2025-07-23 DIAGNOSIS — Z79.4 TYPE 2 DIABETES MELLITUS WITH HYPERGLYCEMIA, WITH LONG-TERM CURRENT USE OF INSULIN (HCC): Primary | ICD-10-CM

## 2025-07-23 DIAGNOSIS — I25.10 CHRONIC CORONARY ARTERY DISEASE: ICD-10-CM

## 2025-07-23 PROCEDURE — 3044F HG A1C LEVEL LT 7.0%: CPT | Performed by: INTERNAL MEDICINE

## 2025-07-23 PROCEDURE — 99214 OFFICE O/P EST MOD 30 MIN: CPT | Performed by: INTERNAL MEDICINE

## 2025-07-23 PROCEDURE — 3077F SYST BP >= 140 MM HG: CPT | Performed by: INTERNAL MEDICINE

## 2025-07-23 PROCEDURE — 95251 CONT GLUC MNTR ANALYSIS I&R: CPT | Performed by: INTERNAL MEDICINE

## 2025-07-23 PROCEDURE — 3079F DIAST BP 80-89 MM HG: CPT | Performed by: INTERNAL MEDICINE

## 2025-07-23 RX ORDER — INSULIN DEGLUDEC 200 U/ML
110 INJECTION, SOLUTION SUBCUTANEOUS 2 TIMES DAILY
Qty: 90 ML | Refills: 3 | Status: SHIPPED | OUTPATIENT
Start: 2025-07-23

## 2025-07-23 RX ORDER — DAPAGLIFLOZIN 10 MG/1
10 TABLET, FILM COATED ORAL EVERY MORNING
Qty: 90 TABLET | Refills: 3 | Status: SHIPPED | OUTPATIENT
Start: 2025-07-23

## 2025-07-23 RX ORDER — ACYCLOVIR 400 MG/1
TABLET ORAL
Qty: 9 EACH | Refills: 3 | Status: SHIPPED | OUTPATIENT
Start: 2025-07-23

## 2025-07-23 RX ORDER — LEVOTHYROXINE SODIUM 125 UG/1
250 TABLET ORAL
Qty: 180 TABLET | Refills: 3 | Status: SHIPPED | OUTPATIENT
Start: 2025-07-23

## 2025-07-23 RX ORDER — INSULIN ASPART 100 [IU]/ML
INJECTION, SOLUTION INTRAVENOUS; SUBCUTANEOUS
Qty: 135 ADJUSTABLE DOSE PRE-FILLED PEN SYRINGE | Refills: 3 | Status: SHIPPED | OUTPATIENT
Start: 2025-07-23

## 2025-07-23 RX ORDER — DULAGLUTIDE 4.5 MG/.5ML
4.5 INJECTION, SOLUTION SUBCUTANEOUS WEEKLY
Qty: 6 ML | Refills: 3 | Status: SHIPPED | OUTPATIENT
Start: 2025-07-23

## 2025-07-23 RX ORDER — ATORVASTATIN CALCIUM 80 MG/1
80 TABLET, FILM COATED ORAL DAILY
Qty: 90 TABLET | Refills: 3 | Status: SHIPPED | OUTPATIENT
Start: 2025-07-23

## 2025-07-23 RX ORDER — FENOFIBRATE 160 MG/1
160 TABLET ORAL DAILY
Qty: 90 TABLET | Refills: 3 | Status: SHIPPED | OUTPATIENT
Start: 2025-07-23

## 2025-07-23 RX ORDER — METOPROLOL TARTRATE 25 MG/1
25 TABLET, FILM COATED ORAL 2 TIMES DAILY
Qty: 180 TABLET | Refills: 3 | Status: SHIPPED | OUTPATIENT
Start: 2025-07-23

## 2025-07-23 RX ORDER — LOSARTAN POTASSIUM 50 MG/1
50 TABLET ORAL DAILY
Qty: 90 TABLET | Refills: 3 | Status: SHIPPED | OUTPATIENT
Start: 2025-07-23 | End: 2026-07-23

## 2025-07-23 ASSESSMENT — ENCOUNTER SYMPTOMS
CONSTIPATION: 0
DIARRHEA: 0
BACK PAIN: 1

## 2025-07-23 NOTE — PROGRESS NOTES
MELISSA Ron MD, [facility] ENDOCRINOLOGY   AND   THYROID NODULE CLINIC            Reason for visit: Follow-up of diabetes, thyroid cancer, and hypothyroidism      ASSESSMENT AND PLAN:    1. Type 2 diabetes mellitus with hyperglycemia, with long-term current use of insulin (HCC)  Glycemic control is quite good.  No changes.  Reassess in 6 months.  - TRESIBA FLEXTOUCH 200 UNIT/ML SOPN; Inject 110 Units into the skin in the morning and at bedtime  Dispense: 90 mL; Refill: 3  - TRULICITY 4.5 MG/0.5ML SOAJ; Inject 4.5 mg into the skin once a week  Dispense: 6 mL; Refill: 3  - NOVOLOG FLEXPEN 100 UNIT/ML injection pen; 25 units with meals plus 3 units per 50 >150 AC/HS (up to 150 units per day)  Dispense: 135 Adjustable Dose Pre-filled Pen Syringe; Refill: 3  - FARXIGA 10 MG tablet; Take 1 tablet by mouth every morning  Dispense: 90 tablet; Refill: 3  - Insulin Pen Needle 32G X 4 MM MISC; 1 each by Does not apply route 5 (five) times a day  Dispense: 500 each; Refill: 3  - Continuous Glucose Sensor (DEXCOM G7 SENSOR) MISC; Change every 10 days as directed  Dispense: 9 each; Refill: 3  - Comprehensive Metabolic Panel; Future  - Albumin/Creatinine Ratio, Urine; Future  - Hemoglobin A1C; Future  - GLUCOSE MONITOR, 72 HOUR, PHYS INTERP    2. History of thyroid cancer  I have no records related to his thyroid cancer, but no testing other than occasional thyroglobulin is necessary.  His most recent thyroglobulin (July 2025) was negative    3. Postsurgical hypothyroidism  TSH target is the normal range.  He is clinically and biochemically euthyroid.  No changes.  - levothyroxine (SYNTHROID) 125 MCG tablet; Take 2 tablets by mouth every morning (before breakfast)  Dispense: 180 tablet; Refill: 3  - TSH reflex to FT4; Future    4. Chronic coronary artery disease  - FARXIGA 10 MG tablet; Take 1 tablet by mouth every morning  Dispense: 90 tablet; Refill: 3    5. Hypercholesterolemia  LDL is at target.  Lab Results

## 2025-08-26 ENCOUNTER — TELEMEDICINE (OUTPATIENT)
Dept: NEUROSURGERY | Age: 60
End: 2025-08-26

## 2025-08-26 DIAGNOSIS — M54.16 LUMBAR RADICULOPATHY: ICD-10-CM

## 2025-08-26 DIAGNOSIS — Z98.1 S/P LUMBAR SPINAL FUSION: ICD-10-CM

## 2025-08-26 DIAGNOSIS — R29.818 NEUROGENIC CLAUDICATION: ICD-10-CM

## 2025-08-26 DIAGNOSIS — M43.17 SPONDYLOLISTHESIS OF LUMBOSACRAL REGION: ICD-10-CM

## 2025-08-26 DIAGNOSIS — M47.9 SPONDYLOSIS: Primary | ICD-10-CM

## (undated) DEVICE — DRSG AQUACEL SURG 3.5X6IN -- CONVERT TO ITEM 369227

## (undated) DEVICE — WILSON FRAME KIT: Brand: MEDLINE INDUSTRIES, INC.

## (undated) DEVICE — BLADE ASSEMB CLP HAIR FINE --

## (undated) DEVICE — DRAPE XR C ARM 41X74IN LF --

## (undated) DEVICE — 3M™ TEGADERM™ TRANSPARENT FILM DRESSING FRAME STYLE, 1628, 6 IN X 8 IN (15 CM X 20 CM), 10/CT 8CT/CASE: Brand: 3M™ TEGADERM™

## (undated) DEVICE — AGENT HEMSTAT 8ML FLX TIP MTRX + DISP SURGIFLO

## (undated) DEVICE — AGENT HEMSTAT W3XL4IN OXIDIZED REGENERATED CELOS ABSRB FOR

## (undated) DEVICE — SYR LR LCK 1ML GRAD NSAF 30ML --

## (undated) DEVICE — DRAIN SURG W7MMXL20CM SIL FULL PERF HUBLESS FLAT RADPQ STRP

## (undated) DEVICE — 1010 S-DRAPE TOWEL DRAPE 10/BX: Brand: STERI-DRAPE™

## (undated) DEVICE — SUTURE VCRL + SZ 3-0 L18IN ABSRB UD SH 1/2 CIR TAPERCUT NDL VCP864D

## (undated) DEVICE — CODMAN VERSATRU™ STANDARD DISPOSABLE NON-STICK BIPOLAR FORCEPS 9" (23CM), 1.5MM TIP: Brand: CODMAN VERSATRU™

## (undated) DEVICE — STANDARD HYPODERMIC NEEDLE,POLYPROPYLENE HUB: Brand: MONOJECT

## (undated) DEVICE — 2000CC GUARDIAN II: Brand: GUARDIAN

## (undated) DEVICE — Device

## (undated) DEVICE — SOLUTION IV 1000ML 0.9% SOD CHL

## (undated) DEVICE — INTENDED FOR TISSUE SEPARATION, AND OTHER PROCEDURES THAT REQUIRE A SHARP SURGICAL BLADE TO PUNCTURE OR CUT.: Brand: BARD-PARKER ® STAINLESS STEEL BLADES

## (undated) DEVICE — SURGIFOAM SPNG SZ 100

## (undated) DEVICE — REM POLYHESIVE ADULT PATIENT RETURN ELECTRODE: Brand: VALLEYLAB

## (undated) DEVICE — GOWN,PREVENTION PLUS,2XL,ST,22/CS: Brand: MEDLINE

## (undated) DEVICE — DURASEAL® DURAL SEALANT SYSTEM 5ML 5 PACK: Brand: DURASEAL®

## (undated) DEVICE — BIPOLAR SEALER 23-112-1 AQM 6.0: Brand: AQUAMANTYS ®

## (undated) DEVICE — SPINE NEURO: Brand: MEDLINE INDUSTRIES, INC.

## (undated) DEVICE — Z CONVERTED USE 2421973 CONTAINER SPEC 60/30ML 10% FRMLN POLYPR PREFIL

## (undated) DEVICE — SUTURE VCRL VIO BR 0 18IN C/R M04 J701D

## (undated) DEVICE — CATHETER IV 14GA L1.25IN PTFE ORNG FEP SFTY STR HUB RADPQ

## (undated) DEVICE — GARMENT,MEDLINE,DVT,INT,CALF,MED, GEN2: Brand: MEDLINE

## (undated) DEVICE — GOWN,BREATHABLE SLV,AURORA,LG,STRL: Brand: MEDLINE

## (undated) DEVICE — POWDER HEMOSTAT GEL 1GR --

## (undated) DEVICE — PREP SKN CHLRAPRP APL 26ML STR --

## (undated) DEVICE — TOTAL TRAY, DB, 100% SILI FOLEY, 16FR 10: Brand: MEDLINE

## (undated) DEVICE — DRAPE TWL SURG 16X26IN BLU ORB04] ALLCARE INC]